# Patient Record
Sex: FEMALE | Race: WHITE | NOT HISPANIC OR LATINO | Employment: PART TIME | URBAN - METROPOLITAN AREA
[De-identification: names, ages, dates, MRNs, and addresses within clinical notes are randomized per-mention and may not be internally consistent; named-entity substitution may affect disease eponyms.]

---

## 2019-05-06 DIAGNOSIS — Z00.00 HEALTH CARE MAINTENANCE: Primary | ICD-10-CM

## 2019-05-06 PROBLEM — J30.1 SEASONAL ALLERGIC RHINITIS DUE TO POLLEN: Status: ACTIVE | Noted: 2019-05-06

## 2019-05-06 PROBLEM — J45.20 ASTHMA, MILD INTERMITTENT: Status: ACTIVE | Noted: 2019-05-06

## 2019-05-06 PROBLEM — E78.2 MIXED HYPERLIPIDEMIA: Status: ACTIVE | Noted: 2019-05-06

## 2019-05-13 ENCOUNTER — LAB (OUTPATIENT)
Dept: INTERNAL MEDICINE | Facility: CLINIC | Age: 59
End: 2019-05-13

## 2019-05-13 DIAGNOSIS — Z00.00 HEALTH CARE MAINTENANCE: Primary | ICD-10-CM

## 2019-05-13 LAB
ALBUMIN SERPL-MCNC: 4.1 G/DL (ref 3.5–5.2)
ALBUMIN/GLOB SERPL: 1.5 G/DL
ALP SERPL-CCNC: 79 U/L (ref 39–117)
ALT SERPL W P-5'-P-CCNC: 15 U/L (ref 1–33)
ANION GAP SERPL CALCULATED.3IONS-SCNC: 10.7 MMOL/L
AST SERPL-CCNC: 11 U/L (ref 1–32)
BACTERIA UR QL AUTO: ABNORMAL /HPF
BASOPHILS # BLD AUTO: 0.05 10*3/MM3 (ref 0–0.2)
BASOPHILS NFR BLD AUTO: 1 % (ref 0–1.5)
BILIRUB SERPL-MCNC: 0.5 MG/DL (ref 0.2–1.2)
BUN BLD-MCNC: 10 MG/DL (ref 8–23)
BUN/CREAT SERPL: 13.2 (ref 7–25)
CALCIUM SPEC-SCNC: 9.4 MG/DL (ref 8.6–10.5)
CHLORIDE SERPL-SCNC: 102 MMOL/L (ref 98–107)
CHOLEST SERPL-MCNC: 197 MG/DL (ref 0–200)
CO2 SERPL-SCNC: 26.3 MMOL/L (ref 22–29)
CREAT BLD-MCNC: 0.76 MG/DL (ref 0.57–1)
DEPRECATED RDW RBC AUTO: 41.7 FL (ref 37–54)
EOSINOPHIL # BLD AUTO: 0.21 10*3/MM3 (ref 0–0.4)
EOSINOPHIL NFR BLD AUTO: 4.3 % (ref 0.3–6.2)
ERYTHROCYTE [DISTWIDTH] IN BLOOD BY AUTOMATED COUNT: 13.4 % (ref 12.3–15.4)
GFR SERPL CREATININE-BSD FRML MDRD: 78 ML/MIN/1.73
GLOBULIN UR ELPH-MCNC: 2.7 GM/DL
GLUCOSE BLD-MCNC: 103 MG/DL (ref 65–99)
HCT VFR BLD AUTO: 44.5 % (ref 34–46.6)
HDLC SERPL-MCNC: 56 MG/DL (ref 40–60)
HGB BLD-MCNC: 14.6 G/DL (ref 12–15.9)
HYALINE CASTS UR QL AUTO: ABNORMAL /LPF
LDLC SERPL CALC-MCNC: 124 MG/DL (ref 0–100)
LDLC/HDLC SERPL: 2.22 {RATIO}
LYMPHOCYTES # BLD AUTO: 1.25 10*3/MM3 (ref 0.7–3.1)
LYMPHOCYTES NFR BLD AUTO: 25.9 % (ref 19.6–45.3)
MCH RBC QN AUTO: 28.4 PG (ref 26.6–33)
MCHC RBC AUTO-ENTMCNC: 32.8 G/DL (ref 31.5–35.7)
MCV RBC AUTO: 86.6 FL (ref 79–97)
MONOCYTES # BLD AUTO: 0.29 10*3/MM3 (ref 0.1–0.9)
MONOCYTES NFR BLD AUTO: 6 % (ref 5–12)
MUCOUS THREADS URNS QL MICRO: ABNORMAL /HPF
NEUTROPHILS # BLD AUTO: 3.03 10*3/MM3 (ref 1.7–7)
NEUTROPHILS NFR BLD AUTO: 62.8 % (ref 42.7–76)
PLATELET # BLD AUTO: 221 10*3/MM3 (ref 140–450)
PMV BLD AUTO: 8.5 FL (ref 6–12)
POTASSIUM BLD-SCNC: 4.7 MMOL/L (ref 3.5–5.2)
PROT SERPL-MCNC: 6.8 G/DL (ref 6–8.5)
RBC # BLD AUTO: 5.14 10*6/MM3 (ref 3.77–5.28)
RBC # UR: ABNORMAL /HPF
REF LAB TEST METHOD: ABNORMAL
SODIUM BLD-SCNC: 139 MMOL/L (ref 136–145)
SQUAMOUS #/AREA URNS HPF: ABNORMAL /HPF
TRIGL SERPL-MCNC: 83 MG/DL (ref 0–150)
TSH SERPL DL<=0.05 MIU/L-ACNC: 2.17 MIU/ML (ref 0.27–4.2)
VLDLC SERPL-MCNC: 16.6 MG/DL (ref 5–40)
WBC NRBC COR # BLD: 4.83 10*3/MM3 (ref 3.4–10.8)
WBC UR QL AUTO: ABNORMAL /HPF

## 2019-05-13 PROCEDURE — 80053 COMPREHEN METABOLIC PANEL: CPT | Performed by: INTERNAL MEDICINE

## 2019-05-13 PROCEDURE — 85025 COMPLETE CBC W/AUTO DIFF WBC: CPT | Performed by: INTERNAL MEDICINE

## 2019-05-13 PROCEDURE — 81015 MICROSCOPIC EXAM OF URINE: CPT | Performed by: INTERNAL MEDICINE

## 2019-05-13 PROCEDURE — 84443 ASSAY THYROID STIM HORMONE: CPT | Performed by: INTERNAL MEDICINE

## 2019-05-13 PROCEDURE — 80061 LIPID PANEL: CPT | Performed by: INTERNAL MEDICINE

## 2019-05-13 PROCEDURE — 36415 COLL VENOUS BLD VENIPUNCTURE: CPT | Performed by: INTERNAL MEDICINE

## 2019-05-17 ENCOUNTER — OFFICE VISIT (OUTPATIENT)
Dept: INTERNAL MEDICINE | Facility: CLINIC | Age: 59
End: 2019-05-17

## 2019-05-17 VITALS
HEIGHT: 61 IN | RESPIRATION RATE: 14 BRPM | BODY MASS INDEX: 30.02 KG/M2 | SYSTOLIC BLOOD PRESSURE: 128 MMHG | DIASTOLIC BLOOD PRESSURE: 62 MMHG | WEIGHT: 159 LBS

## 2019-05-17 DIAGNOSIS — Z12.31 VISIT FOR SCREENING MAMMOGRAM: ICD-10-CM

## 2019-05-17 DIAGNOSIS — F33.1 MODERATE EPISODE OF RECURRENT MAJOR DEPRESSIVE DISORDER (HCC): ICD-10-CM

## 2019-05-17 DIAGNOSIS — Z00.00 HEALTH CARE MAINTENANCE: Primary | ICD-10-CM

## 2019-05-17 PROCEDURE — 99386 PREV VISIT NEW AGE 40-64: CPT | Performed by: INTERNAL MEDICINE

## 2019-05-17 RX ORDER — ESCITALOPRAM OXALATE 20 MG/1
20 TABLET ORAL DAILY
Qty: 30 TABLET | Refills: 11 | Status: SHIPPED | OUTPATIENT
Start: 2019-05-17 | End: 2020-01-31

## 2019-05-17 RX ORDER — MONTELUKAST SODIUM 10 MG/1
10 TABLET ORAL NIGHTLY
Qty: 90 TABLET | Refills: 3 | Status: SHIPPED | OUTPATIENT
Start: 2019-05-17 | End: 2020-06-05

## 2019-05-17 NOTE — PATIENT INSTRUCTIONS
Risk evaluation:  1. Cardiovascular risk factors: obesity, family history of CAD, sedentary life style   2. Diabetes risk factors: obesity, impaired fasting blood sugars and sedentary life style.  3. Cancer risk factors: FH of breast cancer.  4. Risky behavior: none. Use of seat belts: regular. Use of sunscreens: none, patient states that practices sun avoidance.   Tattoos: none.  H/o blood transfusions/organ transplants before 1992 or clotting factor transfusion before 1987: none.     Prevention:  Cholesterol test  up to date. Cholesterol is normal..  Blood sugar test up to date. Fasting blood sugar is mildly elevated. Recommended to avoid sweets and starches: pasta, pizza, bread, potato, corn.  Needs to stop drinking soft drinks..  Hep C testing (for patients born [id number]): discussed and recommended, will proceed with testing..  Mammogram is due. Will schedule.. Breast self exams recommended once a month.  Colonoscopy: Reasons for the screening, benefits, available modalities discussed with patient. Colonoscopy recommended. Risks of undetected polyps and colon cancer explained. Patient declines.She layne not have a car and is in a very difficult financial situation. Has no money for the ride, even if it is a bus.  PAP smear : done today..  DEXA : not indicated yet..     Iliotibial band syndrome-I had suggested that the patient look up physical therapy and exercises for this condition online.  Start doing those at home.  Knee pain-most likely combination of iliotibial band syndrome and osteoarthritis.  Weight loss would be very beneficial.  Use some heat or over-the-counter IcyHot, salonpas patches, and likes for pain if needed.  Major depression, not controlled, recurrent-we will restart Lexapro, that used to work for her in the past.  Moderate intermittent asthma-we will restart montelukast once a day.  Patient states that she is not able to afford inhalers due to high cost.  Given prescription for albuterol  HFA.  Mildly elevated fasting blood sugar-I had advised the patient needs to stop drinking soft drinks.  Weight loss and regular exercise recommended.  Will monitor.  Family history of breast cancer in her sister-importance of the mammogram for screening and prevention explained.  I had advised the patient that if she has significant co-pay for this test, she is to call my office and let me know and I will try to help your as much as they can.  At that point mammogram is mandatory.

## 2019-05-17 NOTE — PROGRESS NOTES
"Subjective   Paula Trevino is a 58 y.o. female.     History of Present Illness   /62 (BP Location: Left arm, Patient Position: Sitting, Cuff Size: Adult)   Resp 14   Ht 154.9 cm (61\")   Wt 72.1 kg (159 lb)   BMI 30.04 kg/m²   Patient is here for yearly CPE. Last CPE was  4 year ago.  PMH, SH and FH reviewed. Changes in the FH: none .  Patient rates her own health as: good.  Tobacco use: none.  Alcohol use:none.  Recreational drugs use: none  Medication list rewieved.  Diet: regular.  Exercise: none.  Marital status: single.   Employment: part time.  Patient rates her stress level as: high.  Dental health: good. Brushes teeth 1 time a day, flosses 1 time a day . Dental visits: 2 times a year .  Vision correction: glasses  Hearing: normal.    Recent vaccinations:   Flu discussed and recommended, but patient declines  Tdap  is up to date  Shingles prevention discussed and recommended to get Shindrix at the pharmacy    Patient is postmenopausal. Past pregnancies: none. Currently patient is on no HRT .    No current outpatient medications on file.                             The following portions of the patient's history were reviewed and updated as appropriate: allergies, current medications, past family history, past medical history, past social history, past surgical history and problem list.    Review of Systems   Constitutional: Negative for chills and fever.   HENT: Negative for postnasal drip, sinus pressure and sore throat.    Eyes: Negative for pain and itching.   Respiratory: Positive for shortness of breath. Negative for cough and chest tightness.    Cardiovascular: Positive for palpitations. Negative for chest pain and leg swelling.   Gastrointestinal: Negative for abdominal pain and blood in stool.   Endocrine: Negative for cold intolerance and heat intolerance.   Genitourinary: Negative for difficulty urinating and flank pain.   Musculoskeletal: Positive for back pain and neck pain.   Skin: " Negative for color change and rash.   Neurological: Negative for dizziness, weakness and headaches.   Hematological: Negative for adenopathy. Does not bruise/bleed easily.   Psychiatric/Behavioral: Positive for sleep disturbance. The patient is nervous/anxious.        Objective   Physical Exam   Constitutional: She is oriented to person, place, and time. She appears well-developed. No distress.   obese   HENT:   Head: Normocephalic and atraumatic.   Right Ear: Tympanic membrane, external ear and ear canal normal. No tenderness. No mastoid tenderness. Tympanic membrane is not injected. No middle ear effusion.   Left Ear: Tympanic membrane, external ear and ear canal normal. No tenderness. No mastoid tenderness. Tympanic membrane is not injected.  No middle ear effusion.   Nose: Nose normal. Right sinus exhibits no maxillary sinus tenderness and no frontal sinus tenderness. Left sinus exhibits no maxillary sinus tenderness and no frontal sinus tenderness.   Mouth/Throat: Uvula is midline, oropharynx is clear and moist and mucous membranes are normal. No oropharyngeal exudate or posterior oropharyngeal edema.   Eyes: Conjunctivae, EOM and lids are normal. Pupils are equal, round, and reactive to light. Right eye exhibits no discharge. Left eye exhibits no discharge. Right conjunctiva is not injected. Right conjunctiva has no hemorrhage. Left conjunctiva is not injected. Left conjunctiva has no hemorrhage. No scleral icterus. Right eye exhibits normal extraocular motion and no nystagmus. Left eye exhibits normal extraocular motion and no nystagmus.   Neck: Normal range of motion and full passive range of motion without pain. Neck supple. No JVD present. Carotid bruit is not present. No thyromegaly present.   Cardiovascular: Normal rate, regular rhythm, S1 normal, S2 normal, normal heart sounds and intact distal pulses. Exam reveals no gallop and no friction rub.   No murmur heard.  Pulmonary/Chest: Effort normal and  breath sounds normal. No accessory muscle usage. No respiratory distress. She has no decreased breath sounds. She has no wheezes. She has no rhonchi. She has no rales. Right breast exhibits no inverted nipple, no mass, no nipple discharge, no skin change and no tenderness. Left breast exhibits mass. Left breast exhibits no inverted nipple, no nipple discharge, no skin change and no tenderness. Breasts are symmetrical. There is no breast swelling.   On palpation of the left breast there is tender movable grape shaped 12-15 mm soft tissue mass at 2 o'clock position in the outer upper quadrant.       Abdominal: Soft. Bowel sounds are normal. She exhibits no distension, no abdominal bruit and no mass. There is no tenderness. There is no rebound and no guarding. Hernia confirmed negative in the right inguinal area and confirmed negative in the left inguinal area.   Genitourinary: Rectum normal, vagina normal and uterus normal. Rectal exam shows no fissure, no mass and anal tone normal. No breast tenderness, discharge or bleeding. Pelvic exam was performed with patient supine. No labial fusion. There is no rash, tenderness, lesion or injury on the right labia. There is no rash, tenderness, lesion or injury on the left labia. Cervix exhibits no motion tenderness, no discharge and no friability. Right adnexum displays no mass, no tenderness and no fullness. Left adnexum displays no mass, no tenderness and no fullness. No bleeding in the vagina. No vaginal discharge found.   Genitourinary Comments: PAP taken.   Musculoskeletal: She exhibits edema (non-pitting edema +2).   There is a crepitus on range of motion in the left knee.  Patient has tenderness on palpation of the left knee laterally and immediately below the joint line.  Greta test for ITB is positive on the left side.   Lymphadenopathy:        Head (right side): No submental, no submandibular, no preauricular, no posterior auricular and no occipital adenopathy  present.        Head (left side): No submental, no submandibular, no preauricular, no posterior auricular and no occipital adenopathy present.     She has no cervical adenopathy.        Right cervical: No superficial cervical, no deep cervical and no posterior cervical adenopathy present.       Left cervical: No superficial cervical, no deep cervical and no posterior cervical adenopathy present.     She has no axillary adenopathy.        Right: No inguinal and no supraclavicular adenopathy present.        Left: No inguinal and no supraclavicular adenopathy present.   Neurological: She is alert and oriented to person, place, and time. She has normal reflexes. She displays normal reflexes. No cranial nerve deficit or sensory deficit. She exhibits normal muscle tone. Coordination normal.   Reflex Scores:       Bicep reflexes are 2+ on the right side and 2+ on the left side.       Patellar reflexes are 2+ on the right side and 2+ on the left side.  There is some crepitus on range of motion left knee.  On palpation of the lateral left knee there is a tenderness at the joint line and below.  Greta test for RITB is positive on the left side.  Patellar grind test on the left knee is negative.   Skin: Skin is warm. She is not diaphoretic. No cyanosis. Nails show no clubbing.   Psychiatric: She has a normal mood and affect. Her behavior is normal.   Vitals reviewed.      Assessment/Plan   Paula was seen today for establish care.    Diagnoses and all orders for this visit:    Health care maintenance    Moderate episode of recurrent major depressive disorder (CMS/HCC)  -     escitalopram (LEXAPRO) 20 MG tablet; Take 1 tablet by mouth Daily.  -     montelukast (SINGULAIR) 10 MG tablet; Take 1 tablet by mouth Every Night.    Visit for screening mammogram  -     Mammo Screening Bilateral With CAD        Risk evaluation:  1. Cardiovascular risk factors: obesity, family history of CAD, sedentary life style   2. Diabetes risk  factors: obesity, impaired fasting blood sugars and sedentary life style.  3. Cancer risk factors: FH of breast cancer.  4. Risky behavior: none. Use of seat belts: regular. Use of sunscreens: none, patient states that practices sun avoidance.   Tattoos: none.  H/o blood transfusions/organ transplants before 1992 or clotting factor transfusion before 1987: none.     Prevention:  Cholesterol test  up to date. Cholesterol is normal..  Blood sugar test up to date. Fasting blood sugar is mildly elevated. Recommended to avoid sweets and starches: pasta, pizza, bread, potato, corn.  Needs to stop drinking soft drinks..  Hep C testing (for patients born [id number]): discussed and recommended, will proceed with testing..  Mammogram is due. Will schedule.. Breast self exams recommended once a month.  Colonoscopy: Reasons for the screening, benefits, available modalities discussed with patient. Colonoscopy recommended. Risks of undetected polyps and colon cancer explained. Patient declines.She layne not have a car and is in a very difficult financial situation. Has no money for the ride, even if it is a bus.  PAP smear : done today..  DEXA : not indicated yet..     Iliotibial band syndrome-I had suggested that the patient look up physical therapy and exercises for this condition online.  Start doing those at home.  Knee pain-most likely combination of iliotibial band syndrome and osteoarthritis.  Weight loss would be very beneficial.  Use some heat or over-the-counter IcyHot, salonpas patches, and likes for pain if needed.  Major depression, not controlled, recurrent-we will restart Lexapro, that used to work for her in the past.  Moderate intermittent asthma-we will restart montelukast once a day.  Patient states that she is not able to afford inhalers due to high cost.  Given prescription for albuterol HFA.  Mildly elevated fasting blood sugar-I had advised the patient needs to stop drinking soft drinks.  Weight loss and  regular exercise recommended.  Will monitor.  Family history of breast cancer in her sister-importance of the mammogram for screening and prevention explained.  I had advised the patient that if she has significant co-pay for this test, she is to call my office and let me know and I will try to help your as much as they can.  At that point mammogram is mandatory

## 2019-05-22 LAB
CHROM ANALY OVERALL INTERP-IMP: NORMAL
CONV .: NORMAL
CONV .: NORMAL
CONV PERFORMED BY:: NORMAL
DX ICD CODE: NORMAL
HIV 1 & 2 AB SER-IMP: NORMAL
REF LAB TEST METHOD: NORMAL
STAT OF ADQ CVX/VAG CYTO-IMP: NORMAL

## 2019-05-28 ENCOUNTER — TRANSCRIBE ORDERS (OUTPATIENT)
Dept: ADMINISTRATIVE | Facility: HOSPITAL | Age: 59
End: 2019-05-28

## 2019-05-28 DIAGNOSIS — Z12.31 VISIT FOR SCREENING MAMMOGRAM: Primary | ICD-10-CM

## 2019-06-13 ENCOUNTER — APPOINTMENT (OUTPATIENT)
Dept: MAMMOGRAPHY | Facility: HOSPITAL | Age: 59
End: 2019-06-13

## 2019-06-27 ENCOUNTER — APPOINTMENT (OUTPATIENT)
Dept: MAMMOGRAPHY | Facility: HOSPITAL | Age: 59
End: 2019-06-27

## 2019-07-11 ENCOUNTER — HOSPITAL ENCOUNTER (OUTPATIENT)
Dept: MAMMOGRAPHY | Facility: HOSPITAL | Age: 59
Discharge: HOME | End: 2019-07-11
Admitting: INTERNAL MEDICINE

## 2019-07-11 DIAGNOSIS — Z12.31 VISIT FOR SCREENING MAMMOGRAM: ICD-10-CM

## 2019-07-11 PROCEDURE — 77063 BREAST TOMOSYNTHESIS BI: CPT

## 2019-07-11 PROCEDURE — 77067 SCR MAMMO BI INCL CAD: CPT

## 2019-11-01 ENCOUNTER — TELEPHONE (OUTPATIENT)
Dept: INTERNAL MEDICINE | Facility: CLINIC | Age: 59
End: 2019-11-01

## 2019-11-01 DIAGNOSIS — R35.0 URINARY FREQUENCY: Primary | ICD-10-CM

## 2019-11-01 RX ORDER — CIPROFLOXACIN 250 MG/1
250 TABLET, FILM COATED ORAL 2 TIMES DAILY
Qty: 6 TABLET | Refills: 0 | Status: SHIPPED | OUTPATIENT
Start: 2019-11-01 | End: 2019-11-04

## 2019-11-01 NOTE — TELEPHONE ENCOUNTER
Ciprofloxacin been called to my her pharmacy.  Take 1 pill twice a day for 3 days.  I think this is the only option, as she is allergic to everything else.  But this is a good medication.

## 2020-01-30 ENCOUNTER — TELEPHONE (OUTPATIENT)
Dept: INTERNAL MEDICINE | Facility: CLINIC | Age: 60
End: 2020-01-30

## 2020-01-30 NOTE — TELEPHONE ENCOUNTER
Patient is calling in complaining of prolonged pain when urinating and burning and lower abdomen pain. Also currently has a cough, chills, and dry itchy throat. Patient was offered an APRN appointment is trying to be seen only by Dr. Mahoney.     HAS AN APPT  WITH HER THERAPIST so wont be able to answer during that time.

## 2020-01-30 NOTE — TELEPHONE ENCOUNTER
Patient notified, she is going to try to find a ride for appointment tomorrow if unable to come she will contact the office early in the morning

## 2020-01-31 ENCOUNTER — OFFICE VISIT (OUTPATIENT)
Dept: INTERNAL MEDICINE | Facility: CLINIC | Age: 60
End: 2020-01-31

## 2020-01-31 VITALS
HEIGHT: 61 IN | BODY MASS INDEX: 30.78 KG/M2 | SYSTOLIC BLOOD PRESSURE: 122 MMHG | TEMPERATURE: 98.3 F | WEIGHT: 163 LBS | RESPIRATION RATE: 16 BRPM | DIASTOLIC BLOOD PRESSURE: 82 MMHG

## 2020-01-31 DIAGNOSIS — F33.1 MODERATE EPISODE OF RECURRENT MAJOR DEPRESSIVE DISORDER (HCC): ICD-10-CM

## 2020-01-31 DIAGNOSIS — J02.9 SORE THROAT: ICD-10-CM

## 2020-01-31 DIAGNOSIS — J06.9 VIRAL UPPER RESPIRATORY TRACT INFECTION: ICD-10-CM

## 2020-01-31 DIAGNOSIS — Z23 NEED FOR VACCINATION: Primary | ICD-10-CM

## 2020-01-31 PROCEDURE — 99214 OFFICE O/P EST MOD 30 MIN: CPT | Performed by: INTERNAL MEDICINE

## 2020-01-31 RX ORDER — DESVENLAFAXINE 50 MG/1
50 TABLET, EXTENDED RELEASE ORAL DAILY
Qty: 30 TABLET | Refills: 5 | Status: SHIPPED | OUTPATIENT
Start: 2020-01-31 | End: 2020-06-11

## 2020-01-31 RX ORDER — PREDNISONE 10 MG/1
TABLET ORAL
Qty: 20 TABLET | Refills: 0 | Status: SHIPPED | OUTPATIENT
Start: 2020-01-31 | End: 2020-06-11

## 2020-01-31 NOTE — PATIENT INSTRUCTIONS
1. Depression - patient had stopped Escitalopram on her own due to the weight gain. Will change to daily Desvenalafaxine - to be taken once a day. This medication will not cause weight gain. Takes 2-3 weeks for the effect. Continue sessions with therapist.  2. Upper respiratory infection - will treat with low course of steroids, to be taken with meals.  3. Frequent UTIs - needs to try Cranberry tablets or D-mannose over the counter.

## 2020-01-31 NOTE — PROGRESS NOTES
"Subjective   Paula Trevino is a 59 y.o. female.     History of Present Illness   /82 (BP Location: Left arm, Patient Position: Sitting, Cuff Size: Adult)   Temp 98.3 °F (36.8 °C)   Resp 16   Ht 154.9 cm (61\")   Wt 73.9 kg (163 lb)   BMI 30.80 kg/m²   Patient complains of nasal congestion and dry cough. S-ms started 5 days ago ago.Patient describes cough as nonproductive. Associated symptoms include headache, sneezing and sore throat. Symptoms get worse  laying down. Patient has fatigue. Patient has had some chest congestion, but no wheezing.   Patient has had no ID contacts.   Overall s-ms had been fluctuating a bit. Patient had tried to use OTC cough medication. without improvement.  She had stopped Escitalopram due to the weight gain.  Patient c/o frequent bladder spasms, not sure iof she has frequent UTIs - had been taking AZO OTC, and states that s-sms reoccur every time that she stops it.   Patient I also worries about palpable nodule in the right side of the torso, no pain .      Current Outpatient Medications:   •  escitalopram (LEXAPRO) 20 MG tablet, Take 1 tablet by mouth Daily., Disp: 30 tablet, Rfl: 11  •  montelukast (SINGULAIR) 10 MG tablet, Take 1 tablet by mouth Every Night., Disp: 90 tablet, Rfl: 3  The following portions of the patient's history were reviewed and updated as appropriate: allergies, current medications, past family history, past medical history, past social history, past surgical history and problem list.       Review of Systems   Constitutional: Positive for chills. Negative for fever.   Eyes: Negative for pain and redness.   Respiratory: Positive for cough and shortness of breath.    Cardiovascular: Negative for chest pain and leg swelling.   Neurological: Positive for headaches. Negative for dizziness.       Objective   Physical Exam   Constitutional: She is oriented to person, place, and time. She appears well-developed and well-nourished. No distress.   obese   HENT: "   Head: Normocephalic and atraumatic.   Right Ear: Tympanic membrane, external ear and ear canal normal. No tenderness. No mastoid tenderness. Tympanic membrane is not injected. No middle ear effusion.   Left Ear: Tympanic membrane, external ear and ear canal normal. No tenderness. No mastoid tenderness. Tympanic membrane is not injected.  No middle ear effusion.   Nose: Nose normal. No sinus tenderness. Right sinus exhibits no maxillary sinus tenderness and no frontal sinus tenderness. Left sinus exhibits no maxillary sinus tenderness and no frontal sinus tenderness.   Mouth/Throat: Uvula is midline, oropharynx is clear and moist and mucous membranes are normal. No oral lesions. No oropharyngeal exudate.   Eyes: Pupils are equal, round, and reactive to light. Conjunctivae, EOM and lids are normal. Right eye exhibits no discharge. Left eye exhibits no discharge. No scleral icterus.   Neck: Neck supple. No JVD present. No thyromegaly present.   Cardiovascular: Normal rate, regular rhythm and normal heart sounds. Exam reveals no gallop and no friction rub.   No murmur heard.  Pulmonary/Chest: Effort normal and breath sounds normal. No accessory muscle usage. No tachypnea and no bradypnea. No respiratory distress. She has no decreased breath sounds. She has no wheezes. She has no rales. She exhibits no tenderness.   Musculoskeletal: She exhibits no edema.   Palpable, movable, non-tender olive shaped 20 mm mass over the left thoracic cage in axillary line   Lymphadenopathy:        Head (right side): No submental, no submandibular, no preauricular, no posterior auricular and no occipital adenopathy present.        Head (left side): No submental, no submandibular, no preauricular, no posterior auricular and no occipital adenopathy present.     She has no cervical adenopathy.        Right cervical: No superficial cervical, no deep cervical and no posterior cervical adenopathy present.       Left cervical: No superficial  cervical, no deep cervical and no posterior cervical adenopathy present.        Right: No supraclavicular adenopathy present.        Left: No supraclavicular adenopathy present.   Neurological: She is alert and oriented to person, place, and time. No cranial nerve deficit.   Skin: Skin is warm and dry. No rash noted. She is not diaphoretic.   Psychiatric: She has a normal mood and affect. Her behavior is normal.   Vitals reviewed.      Assessment/Plan   Paula was seen today for cough.    Diagnoses and all orders for this visit:    Need for vaccination  -     Cancel: POC Influenza A / B  -     predniSONE (DELTASONE) 10 MG tablet; TID x 3d, then BID x 3d, then qd x 5d    Sore throat  -     Cancel: POC Rapid Strep A    Moderate episode of recurrent major depressive disorder (CMS/HCC)  -     desvenlafaxine ER (KHEDEZLA) 50 MG tablet sustained-release 24 hour 24 hr tablet; Take 1 tablet by mouth Daily.    Viral upper respiratory tract infection    1. Depression - patient had stopped Escitalopram on her own due to the weight gain. Will change to daily Desvenlafaxine - to be taken once a day. This medication will not cause weight gain. Takes 2-3 weeks for the effect. Continue sessions with therapist.  2. Upper respiratory infection - will treat with low course of steroids, to be taken with meals.  3. Frequent UTIs - needs to try Cranberry tablets or D-mannose over the counter.  4. Thoracic wall lipoma - reassured. No interventions needed.

## 2020-06-04 DIAGNOSIS — F33.1 MODERATE EPISODE OF RECURRENT MAJOR DEPRESSIVE DISORDER (HCC): ICD-10-CM

## 2020-06-05 RX ORDER — MONTELUKAST SODIUM 10 MG/1
TABLET ORAL
Qty: 90 TABLET | Refills: 0 | Status: SHIPPED | OUTPATIENT
Start: 2020-06-05 | End: 2020-10-09 | Stop reason: SDUPTHER

## 2020-06-11 ENCOUNTER — OFFICE VISIT (OUTPATIENT)
Dept: INTERNAL MEDICINE | Facility: CLINIC | Age: 60
End: 2020-06-11

## 2020-06-11 VITALS
WEIGHT: 173 LBS | BODY MASS INDEX: 32.66 KG/M2 | SYSTOLIC BLOOD PRESSURE: 124 MMHG | HEART RATE: 84 BPM | DIASTOLIC BLOOD PRESSURE: 78 MMHG | HEIGHT: 61 IN | OXYGEN SATURATION: 98 %

## 2020-06-11 DIAGNOSIS — Z12.31 VISIT FOR SCREENING MAMMOGRAM: ICD-10-CM

## 2020-06-11 DIAGNOSIS — J45.20 MILD INTERMITTENT ASTHMA, UNSPECIFIED WHETHER COMPLICATED: ICD-10-CM

## 2020-06-11 DIAGNOSIS — Z12.12 SCREENING FOR COLORECTAL CANCER: ICD-10-CM

## 2020-06-11 DIAGNOSIS — Z00.00 HEALTH CARE MAINTENANCE: Primary | ICD-10-CM

## 2020-06-11 DIAGNOSIS — F33.1 MODERATE EPISODE OF RECURRENT MAJOR DEPRESSIVE DISORDER (HCC): ICD-10-CM

## 2020-06-11 DIAGNOSIS — Z11.59 SCREENING FOR VIRAL DISEASE: ICD-10-CM

## 2020-06-11 DIAGNOSIS — Z12.11 SCREENING FOR COLORECTAL CANCER: ICD-10-CM

## 2020-06-11 PROCEDURE — 99396 PREV VISIT EST AGE 40-64: CPT | Performed by: INTERNAL MEDICINE

## 2020-06-11 RX ORDER — VENLAFAXINE 37.5 MG/1
37.5 TABLET ORAL 2 TIMES DAILY
Qty: 60 TABLET | Refills: 11 | Status: SHIPPED | OUTPATIENT
Start: 2020-06-11 | End: 2021-06-19 | Stop reason: SDUPTHER

## 2020-06-11 RX ORDER — ALBUTEROL SULFATE 90 UG/1
AEROSOL, METERED RESPIRATORY (INHALATION)
COMMUNITY
Start: 2020-05-09 | End: 2022-12-27 | Stop reason: SDUPTHER

## 2020-06-11 NOTE — PROGRESS NOTES
"Subjective   Paula Trevino is a 59 y.o. female.     History of Present Illness   /78 (BP Location: Right arm, Patient Position: Sitting, Cuff Size: Adult)   Pulse 84   Ht 154.9 cm (61\")   Wt 78.5 kg (173 lb)   SpO2 98%   BMI 32.69 kg/m²   Patient is here for yearly CPE. Last CPE was  1 year ago.  PMH, SH and FH reviewed. Changes in the FH: none .  Patient rates her own health as: good.  Tobacco use: none.  Alcohol use:none.  Recreational drugs use: none  Medication list rewieved.  Diet: regular.  Exercise: none.  Marital status: single.   Employment: recently had been laid off due to coronavisus, and is hoping to be called back to work soon..  Patient rates her stress level as: moderate. She had been working with therapist, and this had helped a lot. Had not been able to see therapist due to COVID 19 - telehealth is not covered by her insurance at that time.  Dental health: good. Brushes teeth 2 times a day, flosses 1 time a day . Dental visits: 2 times a year .  Vision correction: reading glasses  Hearing: normal.    Recent vaccinations:   Flu  is up to date and recommended yearly  Tdap  is up to date  Shingles prevention will address after the pandemic.    Patient is postmenopausal. Past pregnancies: none. Currently patient is not a candidate for HRT.      Current Outpatient Medications:   •  albuterol sulfate  (90 Base) MCG/ACT inhaler, INHALE 2 PUFFS BY MOUTH EVERY HOUR AS NEEDED FOR SHORTNESS OF BREATH, Disp: , Rfl:   •  montelukast (SINGULAIR) 10 MG tablet, TAKE 1 TABLET BY MOUTH AT NIGHT, Disp: 90 tablet, Rfl: 0          The following portions of the patient's history were reviewed and updated as appropriate: allergies, current medications, past family history, past medical history, past social history, past surgical history and problem list.    Review of Systems   Constitutional: Negative for chills and fever.   HENT: Negative for postnasal drip, sinus pressure and sore throat.    Eyes: " Negative for pain and itching.   Respiratory: Negative for cough and chest tightness.    Cardiovascular: Negative for chest pain and leg swelling.   Gastrointestinal: Negative for abdominal pain and blood in stool.   Endocrine: Negative for cold intolerance and heat intolerance.   Genitourinary: Negative for difficulty urinating and flank pain.   Musculoskeletal: Negative for back pain and neck pain.   Skin: Negative for color change and rash.   Neurological: Negative for dizziness, weakness and headaches.   Hematological: Negative for adenopathy. Does not bruise/bleed easily.   Psychiatric/Behavioral: Negative for sleep disturbance. The patient is not nervous/anxious.        Objective   Physical Exam   Constitutional: She is oriented to person, place, and time. Vital signs are normal. She appears well-developed. No distress.   obese   HENT:   Head: Normocephalic and atraumatic.   Right Ear: External ear normal.   Left Ear: External ear normal.   Nose: Nose normal. No mucosal edema. Right sinus exhibits no maxillary sinus tenderness and no frontal sinus tenderness. Left sinus exhibits no maxillary sinus tenderness and no frontal sinus tenderness.   Mouth/Throat: Oropharynx is clear and moist. No oropharyngeal exudate.   Eyes: Pupils are equal, round, and reactive to light. Conjunctivae, EOM and lids are normal. Right eye exhibits no discharge. Left eye exhibits no discharge. Right conjunctiva is not injected. Left conjunctiva is not injected. No scleral icterus. Right eye exhibits normal extraocular motion. Left eye exhibits normal extraocular motion.   Neck: Normal range of motion and full passive range of motion without pain. Neck supple. No JVD present. Carotid bruit is not present. No thyromegaly present.   Cardiovascular: Normal rate, regular rhythm, S1 normal, S2 normal, normal heart sounds and intact distal pulses. PMI is not displaced. Exam reveals no gallop and no friction rub.   No murmur  heard.  Pulmonary/Chest: Effort normal and breath sounds normal. No accessory muscle usage. No respiratory distress. She has no decreased breath sounds. She has no wheezes. She has no rhonchi. She has no rales. She exhibits bony tenderness (on parasternal palpation over the lower sternum). Right breast exhibits no inverted nipple, no mass, no nipple discharge, no skin change and no tenderness. Left breast exhibits no inverted nipple, no mass, no nipple discharge, no skin change and no tenderness. Breasts are symmetrical.       Abdominal: Soft. Bowel sounds are normal. She exhibits no distension, no pulsatile liver, no fluid wave, no abdominal bruit, no ascites and no mass. There is no tenderness. There is no rebound and no guarding.   Musculoskeletal: She exhibits tenderness (on lateral palpation opf the left hip). She exhibits no edema or deformity.   Lymphadenopathy:        Head (right side): No submental, no submandibular, no preauricular, no posterior auricular and no occipital adenopathy present.        Head (left side): No submental, no submandibular, no tonsillar, no preauricular, no posterior auricular and no occipital adenopathy present.     She has no cervical adenopathy.        Right cervical: No superficial cervical, no deep cervical and no posterior cervical adenopathy present.       Left cervical: No superficial cervical, no deep cervical and no posterior cervical adenopathy present.     She has no axillary adenopathy.        Right: No supraclavicular adenopathy present.        Left: No supraclavicular adenopathy present.   Neurological: She is alert and oriented to person, place, and time. She has normal strength and normal reflexes. She displays normal reflexes. No cranial nerve deficit. She exhibits normal muscle tone. Coordination normal.   Reflex Scores:       Bicep reflexes are 2+ on the right side and 2+ on the left side.       Patellar reflexes are 2+ on the right side and 2+ on the left  side.  Skin: Skin is warm and dry. No rash noted. She is not diaphoretic. No erythema.   Psychiatric: She has a normal mood and affect. Her speech is normal and behavior is normal. Thought content normal.   Vitals reviewed.      Assessment/Plan   Diagnoses and all orders for this visit:    Health care maintenance  -     CBC & Differential; Future  -     Comprehensive Metabolic Panel; Future  -     Lipid Panel; Future  -     TSH; Future    Moderate episode of recurrent major depressive disorder (CMS/HCC)  -     venlafaxine (EFFEXOR) 37.5 MG tablet; Take 1 tablet by mouth 2 (Two) Times a Day.    Mild intermittent asthma, unspecified whether complicated  -     Mometasone Furoate (Asmanex HFA) 200 MCG/ACT aerosol; Inhale 1 puff Daily.    Screening for colorectal cancer  -     Occult Blood, Fecal By Immunoassay - Stool, Per Rectum; Future    Screening for viral disease  -     Hepatitis C Antibody; Future        Risk evaluation:  1. Cardiovascular risk factors: obesity, family history of CAD, sedentary life style   2. Diabetes risk factors: obesity, FH of diabetes and sedentary life style.  3. Cancer risk factors: FH of breast cancer.  4. Risky behavior: none. Use of seat belts: regular. Use of sunscreens: none, patient states that practices sun avoidance.   Tattoos: none.  H/o blood transfusions/organ transplants before 1992 or clotting factor transfusion before 1987: none.     Prevention:  Cholesterol test  recommended. Cholesterol is will be checked..  Blood sugar test recommended. Fasting blood sugar will be checked..  Hep C testing (for patients born [id number]): completed..  Mammogram is due. Will schedule.. Breast self exams recommended once a month.  Colonoscopy: Reasons for the screening, benefits, available modalities discussed with patient. Colonoscopy recommended. Risks of undetected polyps and colon cancer explained. Patient declines. Will proceed with FIT test..  PAP smear : not recommended due to very low  risk..

## 2020-06-11 NOTE — PATIENT INSTRUCTIONS
Risk evaluation:  1. Cardiovascular risk factors: obesity, family history of CAD, sedentary life style   2. Diabetes risk factors: obesity, FH of diabetes and sedentary life style.  3. Cancer risk factors: FH of breast cancer.  4. Risky behavior: none. Use of seat belts: regular. Use of sunscreens: none, patient states that practices sun avoidance.   Tattoos: none.  H/o blood transfusions/organ transplants before 1992 or clotting factor transfusion before 1987: none.     Prevention:  Cholesterol test  recommended. Cholesterol is will be checked..  Blood sugar test recommended. Fasting blood sugar will be checked..  Hep C testing (for patients born [birth year]): completed..  Mammogram is due. Will schedule.. Breast self exams recommended once a month.  Colonoscopy: Reasons for the screening, benefits, available modalities discussed with patient. Colonoscopy recommended. Risks of undetected polyps and colon cancer explained. Patient declines. Will proceed with FIT test..  PAP smear : not recommended due to very low risk..

## 2020-06-16 ENCOUNTER — RESULTS ENCOUNTER (OUTPATIENT)
Dept: INTERNAL MEDICINE | Facility: CLINIC | Age: 60
End: 2020-06-16

## 2020-06-16 DIAGNOSIS — Z11.59 SCREENING FOR VIRAL DISEASE: ICD-10-CM

## 2020-06-16 DIAGNOSIS — Z12.11 SCREENING FOR COLORECTAL CANCER: ICD-10-CM

## 2020-06-16 DIAGNOSIS — Z00.00 HEALTH CARE MAINTENANCE: ICD-10-CM

## 2020-06-16 DIAGNOSIS — Z12.12 SCREENING FOR COLORECTAL CANCER: ICD-10-CM

## 2020-06-19 LAB
ALBUMIN SERPL-MCNC: 4.4 G/DL (ref 3.5–5.2)
ALBUMIN/GLOB SERPL: 2.1 G/DL
ALP SERPL-CCNC: 79 U/L (ref 39–117)
ALT SERPL-CCNC: 18 U/L (ref 1–33)
AST SERPL-CCNC: 16 U/L (ref 1–32)
BASOPHILS # BLD AUTO: 0.03 10*3/MM3 (ref 0–0.2)
BASOPHILS NFR BLD AUTO: 0.6 % (ref 0–1.5)
BILIRUB SERPL-MCNC: 0.5 MG/DL (ref 0.2–1.2)
BUN SERPL-MCNC: 12 MG/DL (ref 8–23)
BUN/CREAT SERPL: 12 (ref 7–25)
CALCIUM SERPL-MCNC: 9.3 MG/DL (ref 8.6–10.5)
CHLORIDE SERPL-SCNC: 105 MMOL/L (ref 98–107)
CHOLEST SERPL-MCNC: 209 MG/DL (ref 0–200)
CO2 SERPL-SCNC: 25.9 MMOL/L (ref 22–29)
CREAT SERPL-MCNC: 1 MG/DL (ref 0.57–1)
EOSINOPHIL # BLD AUTO: 0.25 10*3/MM3 (ref 0–0.4)
EOSINOPHIL NFR BLD AUTO: 4.9 % (ref 0.3–6.2)
ERYTHROCYTE [DISTWIDTH] IN BLOOD BY AUTOMATED COUNT: 12.7 % (ref 12.3–15.4)
GLOBULIN SER CALC-MCNC: 2.1 GM/DL
GLUCOSE SERPL-MCNC: 103 MG/DL (ref 65–99)
HCT VFR BLD AUTO: 42.9 % (ref 34–46.6)
HCV AB S/CO SERPL IA: <0.1 S/CO RATIO (ref 0–0.9)
HDLC SERPL-MCNC: 55 MG/DL (ref 40–60)
HGB BLD-MCNC: 14.8 G/DL (ref 12–15.9)
IMM GRANULOCYTES # BLD AUTO: 0.01 10*3/MM3 (ref 0–0.05)
IMM GRANULOCYTES NFR BLD AUTO: 0.2 % (ref 0–0.5)
LDLC SERPL CALC-MCNC: 138 MG/DL (ref 0–100)
LYMPHOCYTES # BLD AUTO: 1.01 10*3/MM3 (ref 0.7–3.1)
LYMPHOCYTES NFR BLD AUTO: 19.6 % (ref 19.6–45.3)
MCH RBC QN AUTO: 29.5 PG (ref 26.6–33)
MCHC RBC AUTO-ENTMCNC: 34.5 G/DL (ref 31.5–35.7)
MCV RBC AUTO: 85.5 FL (ref 79–97)
MONOCYTES # BLD AUTO: 0.32 10*3/MM3 (ref 0.1–0.9)
MONOCYTES NFR BLD AUTO: 6.2 % (ref 5–12)
NEUTROPHILS # BLD AUTO: 3.53 10*3/MM3 (ref 1.7–7)
NEUTROPHILS NFR BLD AUTO: 68.5 % (ref 42.7–76)
NRBC BLD AUTO-RTO: 0 /100 WBC (ref 0–0.2)
PLATELET # BLD AUTO: 222 10*3/MM3 (ref 140–450)
POTASSIUM SERPL-SCNC: 4 MMOL/L (ref 3.5–5.2)
PROT SERPL-MCNC: 6.5 G/DL (ref 6–8.5)
RBC # BLD AUTO: 5.02 10*6/MM3 (ref 3.77–5.28)
SODIUM SERPL-SCNC: 141 MMOL/L (ref 136–145)
TRIGL SERPL-MCNC: 78 MG/DL (ref 0–150)
TSH SERPL DL<=0.005 MIU/L-ACNC: 2.7 UIU/ML (ref 0.27–4.2)
VLDLC SERPL CALC-MCNC: 15.6 MG/DL
WBC # BLD AUTO: 5.15 10*3/MM3 (ref 3.4–10.8)

## 2020-07-24 ENCOUNTER — HOSPITAL ENCOUNTER (OUTPATIENT)
Dept: MAMMOGRAPHY | Facility: HOSPITAL | Age: 60
Discharge: HOME | End: 2020-07-24
Admitting: INTERNAL MEDICINE

## 2020-07-24 DIAGNOSIS — Z12.31 VISIT FOR SCREENING MAMMOGRAM: ICD-10-CM

## 2020-07-24 PROCEDURE — 77063 BREAST TOMOSYNTHESIS BI: CPT

## 2020-07-24 PROCEDURE — 77067 SCR MAMMO BI INCL CAD: CPT

## 2020-08-11 ENCOUNTER — OFFICE VISIT (OUTPATIENT)
Dept: INTERNAL MEDICINE | Facility: CLINIC | Age: 60
End: 2020-08-11

## 2020-08-11 VITALS
SYSTOLIC BLOOD PRESSURE: 120 MMHG | WEIGHT: 170 LBS | HEIGHT: 61 IN | HEART RATE: 83 BPM | BODY MASS INDEX: 32.1 KG/M2 | DIASTOLIC BLOOD PRESSURE: 78 MMHG | OXYGEN SATURATION: 99 %

## 2020-08-11 DIAGNOSIS — F33.1 MODERATE EPISODE OF RECURRENT MAJOR DEPRESSIVE DISORDER (HCC): Primary | ICD-10-CM

## 2020-08-11 DIAGNOSIS — R35.0 URINARY FREQUENCY: ICD-10-CM

## 2020-08-11 DIAGNOSIS — M54.50 CHRONIC BILATERAL LOW BACK PAIN WITHOUT SCIATICA: ICD-10-CM

## 2020-08-11 DIAGNOSIS — Z12.11 SCREENING FOR COLORECTAL CANCER: ICD-10-CM

## 2020-08-11 DIAGNOSIS — Z12.12 SCREENING FOR COLORECTAL CANCER: ICD-10-CM

## 2020-08-11 DIAGNOSIS — G89.29 CHRONIC BILATERAL LOW BACK PAIN WITHOUT SCIATICA: ICD-10-CM

## 2020-08-11 LAB
BILIRUB UR QL STRIP: NEGATIVE
CLARITY UR: CLEAR
COLOR UR: YELLOW
GLUCOSE UR STRIP-MCNC: NEGATIVE MG/DL
HEMOCCULT STL QL IA: NEGATIVE
HGB UR QL STRIP.AUTO: NEGATIVE
KETONES UR QL STRIP: NEGATIVE
LEUKOCYTE ESTERASE UR QL STRIP.AUTO: NEGATIVE
NITRITE UR QL STRIP: NEGATIVE
PH UR STRIP.AUTO: 5.5 [PH] (ref 5–8)
PROT UR QL STRIP: NEGATIVE
SP GR UR STRIP: 1.02 (ref 1–1.03)
UROBILINOGEN UR QL STRIP: NORMAL

## 2020-08-11 PROCEDURE — 99214 OFFICE O/P EST MOD 30 MIN: CPT | Performed by: NURSE PRACTITIONER

## 2020-08-11 PROCEDURE — 82274 ASSAY TEST FOR BLOOD FECAL: CPT | Performed by: NURSE PRACTITIONER

## 2020-08-11 PROCEDURE — 81003 URINALYSIS AUTO W/O SCOPE: CPT | Performed by: NURSE PRACTITIONER

## 2020-08-11 RX ORDER — CYCLOBENZAPRINE HCL 10 MG
10 TABLET ORAL 3 TIMES DAILY PRN
Qty: 30 TABLET | Refills: 0 | Status: SHIPPED | OUTPATIENT
Start: 2020-08-11 | End: 2020-11-25

## 2020-08-11 NOTE — PROGRESS NOTES
Subjective   Paula Trevino is a 59 y.o. female who presents to establish care and to follow-up on depression.  She also complains of urinary frequency.    She has a longstanding history of depression but has had increased symptoms over the past few months.  She is working with a therapist through telehealth and sees her every 2 weeks which she states has been helpful.  She was started on Effexor at her last visit which she is tolerating well.  She is now managed on twice daily dosing for a total of 75 mg daily.  Her biggest complaint today is urinary frequency and nocturia without dysuria.       The following portions of the patient's history were reviewed and updated as appropriate: allergies, current medications, past social history and problem list.    Past Medical History:   Diagnosis Date   • Depression    • Hyperlipidemia    • Infectious mononucleosis    • Pregnancy        • Seasonal allergies          Current Outpatient Medications:   •  albuterol sulfate  (90 Base) MCG/ACT inhaler, INHALE 2 PUFFS BY MOUTH EVERY HOUR AS NEEDED FOR SHORTNESS OF BREATH, Disp: , Rfl:   •  Mometasone Furoate (Asmanex HFA) 200 MCG/ACT aerosol, Inhale 1 puff Daily., Disp: 1 inhaler, Rfl: 11  •  montelukast (SINGULAIR) 10 MG tablet, TAKE 1 TABLET BY MOUTH AT NIGHT, Disp: 90 tablet, Rfl: 0  •  venlafaxine (EFFEXOR) 37.5 MG tablet, Take 1 tablet by mouth 2 (Two) Times a Day., Disp: 60 tablet, Rfl: 11  •  cyclobenzaprine (FLEXERIL) 10 MG tablet, Take 1 tablet by mouth 3 (Three) Times a Day As Needed for Muscle Spasms., Disp: 30 tablet, Rfl: 0    Allergies   Allergen Reactions   • Ampicillin Rash   • Celecoxib Rash   • Doxycycline Rash   • Erythromycin Rash   • Meperidine Rash   • Sulfa Antibiotics Rash       Review of Systems   Constitutional: Negative for chills, fatigue, fever and unexpected weight change.   HENT: Negative for congestion, ear pain, postnasal drip, sinus pressure, sore throat and trouble swallowing.   "  Eyes: Negative for visual disturbance.   Respiratory: Negative for cough, chest tightness and wheezing.    Cardiovascular: Negative for chest pain, palpitations and leg swelling.   Gastrointestinal: Negative for abdominal pain, blood in stool, nausea and vomiting.   Genitourinary: Positive for frequency and urgency. Negative for dysuria.   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 Negative for joint swelling.   Skin: Negative for color change.   Neurological: Negative for syncope, weakness and headaches.   Hematological: Does not bruise/bleed easily.   Psychiatric/Behavioral: Positive for decreased concentration and dysphoric mood. Negative for self-injury and suicidal ideas. The patient is nervous/anxious.        Objective   Vitals:    08/11/20 1505   BP: 120/78   BP Location: Left arm   Patient Position: Sitting   Cuff Size: Adult   Pulse: 83   SpO2: 99%   Weight: 77.1 kg (170 lb)   Height: 154.9 cm (61\")     Body mass index is 32.12 kg/m².  Physical Exam   Constitutional: She appears well-developed and well-nourished. She is cooperative. She does not have a sickly appearance. She does not appear ill.   HENT:   Head: Normocephalic.   Right Ear: Hearing, tympanic membrane and external ear normal.   Left Ear: Hearing, tympanic membrane and external ear normal.   Nose: Nose normal. No mucosal edema, rhinorrhea, sinus tenderness or nasal deformity. Right sinus exhibits no maxillary sinus tenderness and no frontal sinus tenderness. Left sinus exhibits no maxillary sinus tenderness and no frontal sinus tenderness.   Mouth/Throat: Oropharynx is clear and moist and mucous membranes are normal. Normal dentition.   Eyes: Conjunctivae and lids are normal. Right eye exhibits no discharge and no exudate. Left eye exhibits no discharge and no exudate.   Neck: Trachea normal. Carotid bruit is " not present. No edema present. No thyroid mass present.   Cardiovascular: Regular rhythm, normal heart sounds and normal pulses.   No murmur heard.  Pulmonary/Chest: Breath sounds normal. No respiratory distress. She has no decreased breath sounds. She has no wheezes. She has no rhonchi. She has no rales.   Abdominal: Soft. Bowel sounds are normal. There is no tenderness.   Musculoskeletal:        Lumbar back: She exhibits tenderness.   Lymphadenopathy:        Head (right side): No submental, no submandibular, no tonsillar, no preauricular, no posterior auricular and no occipital adenopathy present.        Head (left side): No submental, no submandibular, no tonsillar, no preauricular, no posterior auricular and no occipital adenopathy present.   Neurological: She is alert. She has normal strength. No sensory deficit.   Skin: Skin is warm, dry and intact. No cyanosis. Nails show no clubbing.       Assessment/Plan   Paula was seen today for depression and urinary frequency.    Diagnoses and all orders for this visit:    Moderate episode of recurrent major depressive disorder (CMS/HCC)    Urinary frequency  -     Urinalysis With Microscopic If Indicated (No Culture) - Urine, Clean Catch    Chronic bilateral low back pain without sciatica  -     cyclobenzaprine (FLEXERIL) 10 MG tablet; Take 1 tablet by mouth 3 (Three) Times a Day As Needed for Muscle Spasms.    Screening for colorectal cancer  -     Occult Blood, Fecal By Immunoassay - Stool, Per Rectum; Future  -     Occult Blood, Fecal By Immunoassay - Stool, Per Rectum    1.  She is doing well with Pristiq and feels medication has been helpful.  She will continue current medication and will follow-up in 6 months.  2.  Her urine done in the office today is negative for an infection.  Symptoms are suggestive of an overactive bladder.  We discussed medication options including Ditropan which she has declined for now.  Continue to monitor.  3.  She complains of  intermittent low back pain with muscle spasms and tightness.  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  She may take Flexeril as needed for muscle spasms and pain.  Consider further evaluation if symptoms persist or worsen.  4.  She has returned her Hemoccult from her last visit which was negative for blood.

## 2020-10-09 DIAGNOSIS — F33.1 MODERATE EPISODE OF RECURRENT MAJOR DEPRESSIVE DISORDER (HCC): ICD-10-CM

## 2020-10-09 RX ORDER — MONTELUKAST SODIUM 10 MG/1
10 TABLET ORAL
Qty: 90 TABLET | Refills: 1 | Status: SHIPPED | OUTPATIENT
Start: 2020-10-09 | End: 2021-03-10

## 2020-10-09 NOTE — TELEPHONE ENCOUNTER
Caller: Paula Trevino    Relationship: Self    Best call back number: [phone]*    Medication needed:   Requested Prescriptions     Pending Prescriptions Disp Refills   • montelukast (SINGULAIR) 10 MG tablet 90 tablet 0     Sig: Take 1 tablet by mouth every night at bedtime.       When do you need the refill by: ASAP    What details did the patient provide when requesting the medication: PATIENT COMPLETELY OUT OF MEDICATION    Does the patient have less than a 3 day supply:  [x] Yes  [] No    What is the patient's preferred pharmacy: [address]) [place]  [address] [facility] [phone] [facility] [phone]

## 2020-11-25 ENCOUNTER — OFFICE VISIT (OUTPATIENT)
Dept: INTERNAL MEDICINE | Facility: CLINIC | Age: 60
End: 2020-11-25

## 2020-11-25 VITALS
HEART RATE: 75 BPM | BODY MASS INDEX: 32.1 KG/M2 | HEIGHT: 61 IN | TEMPERATURE: 98.2 F | WEIGHT: 170 LBS | SYSTOLIC BLOOD PRESSURE: 138 MMHG | DIASTOLIC BLOOD PRESSURE: 88 MMHG | OXYGEN SATURATION: 98 %

## 2020-11-25 DIAGNOSIS — M25.552 LEFT HIP PAIN: ICD-10-CM

## 2020-11-25 DIAGNOSIS — M54.50 CHRONIC BILATERAL LOW BACK PAIN WITHOUT SCIATICA: Primary | ICD-10-CM

## 2020-11-25 DIAGNOSIS — R03.0 ELEVATED BLOOD PRESSURE READING: ICD-10-CM

## 2020-11-25 DIAGNOSIS — G89.29 CHRONIC BILATERAL LOW BACK PAIN WITHOUT SCIATICA: Primary | ICD-10-CM

## 2020-11-25 PROCEDURE — 99213 OFFICE O/P EST LOW 20 MIN: CPT | Performed by: NURSE PRACTITIONER

## 2020-12-02 ENCOUNTER — TELEPHONE (OUTPATIENT)
Dept: INTERNAL MEDICINE | Facility: CLINIC | Age: 60
End: 2020-12-02

## 2020-12-02 NOTE — TELEPHONE ENCOUNTER
11/29/20    133/86    11/30/20    155/85    12/1/20    133/78    12/2/20    146/77     THESE ARE HER BLOOD PRESSURE READINGS. THIS NEEDS TO GO TO [facility].     PLEASE ADVISE  [phone]

## 2020-12-03 DIAGNOSIS — I10 ESSENTIAL HYPERTENSION: Primary | ICD-10-CM

## 2020-12-03 RX ORDER — LISINOPRIL 10 MG/1
10 TABLET ORAL DAILY
Qty: 30 TABLET | Refills: 1 | Status: SHIPPED | OUTPATIENT
Start: 2020-12-03 | End: 2021-03-10

## 2020-12-03 NOTE — TELEPHONE ENCOUNTER
Please notify pt her blood pressure readings are elevated and are in the range of hypertension. I would like to start her on a low dose blood pressure medication called Lisinopril. I will send this to her pharmacy. Please ask her to call with her blood pressure readings in 3-4 weeks. Thanks.

## 2020-12-09 ENCOUNTER — OFFICE VISIT (OUTPATIENT)
Dept: ORTHOPEDIC SURGERY | Facility: CLINIC | Age: 60
End: 2020-12-09

## 2020-12-09 VITALS — HEIGHT: 61 IN | WEIGHT: 170 LBS | BODY MASS INDEX: 32.1 KG/M2

## 2020-12-09 DIAGNOSIS — M25.552 LATERAL PAIN OF LEFT HIP: ICD-10-CM

## 2020-12-09 DIAGNOSIS — R52 PAIN: Primary | ICD-10-CM

## 2020-12-09 PROCEDURE — 99243 OFF/OP CNSLTJ NEW/EST LOW 30: CPT | Performed by: ORTHOPAEDIC SURGERY

## 2020-12-09 PROCEDURE — 73501 X-RAY EXAM HIP UNI 1 VIEW: CPT | Performed by: ORTHOPAEDIC SURGERY

## 2020-12-09 PROCEDURE — 20610 DRAIN/INJ JOINT/BURSA W/O US: CPT | Performed by: ORTHOPAEDIC SURGERY

## 2020-12-09 RX ORDER — METHYLPREDNISOLONE ACETATE 80 MG/ML
80 INJECTION, SUSPENSION INTRA-ARTICULAR; INTRALESIONAL; INTRAMUSCULAR; SOFT TISSUE
Status: COMPLETED | OUTPATIENT
Start: 2020-12-09 | End: 2020-12-09

## 2020-12-09 RX ADMIN — METHYLPREDNISOLONE ACETATE 80 MG: 80 INJECTION, SUSPENSION INTRA-ARTICULAR; INTRALESIONAL; INTRAMUSCULAR; SOFT TISSUE at 13:53

## 2020-12-09 NOTE — PROGRESS NOTES
"Patient Name: Paula Trevino   YOB: 1960  Referring Primary Care Physician: Nitza Mena APRN  BMI: Body mass index is 32.12 kg/m².    Chief Complaint:    Chief Complaint   Patient presents with   • Left Hip - Establish Care        HPI:     Paula Trevino is a 59 y.o. female who presents today for evaluation of   Chief Complaint   Patient presents with   • Left Hip - Establish Care   .  Patient is seen today complaining of \"left hip\" pain it hurts her laterally and radiates partially down she also has some low back pain but no known history of back problems it is intermittent and achy.      Subjective   Medications:   Home Medications:  Current Outpatient Medications on File Prior to Visit   Medication Sig   • albuterol sulfate  (90 Base) MCG/ACT inhaler INHALE 2 PUFFS BY MOUTH EVERY HOUR AS NEEDED FOR SHORTNESS OF BREATH   • lisinopril (PRINIVIL,ZESTRIL) 10 MG tablet Take 1 tablet by mouth Daily.   • Mometasone Furoate (Asmanex HFA) 200 MCG/ACT aerosol Inhale 1 puff Daily.   • montelukast (SINGULAIR) 10 MG tablet Take 1 tablet by mouth every night at bedtime.   • venlafaxine (EFFEXOR) 37.5 MG tablet Take 1 tablet by mouth 2 (Two) Times a Day.     No current facility-administered medications on file prior to visit.      Current Medications:  Scheduled Meds:  Continuous Infusions:No current facility-administered medications for this visit.     PRN Meds:.    I have reviewed the patient's medical history in detail and updated the computerized patient record.  Review and summarization of old records includes:    Past Medical History:   Diagnosis Date   • Depression    • Hyperlipidemia    • Infectious mononucleosis    • Pregnancy        • Seasonal allergies         Past Surgical History:   Procedure Laterality Date   • DILATATION AND CURETTAGE     • EYE SURGERY          Social History     Occupational History   • Not on file   Tobacco Use   • Smoking status: Never Smoker   • Smokeless " "tobacco: Never Used   Substance and Sexual Activity   • Alcohol use: No   • Drug use: Defer   • Sexual activity: Defer      Social History     Social History Narrative   • Not on file        Family History   Problem Relation Age of Onset   • Heart disease Father    • Parkinsonism Father    • Dementia Father    • Diabetes Sister    • Breast cancer Sister 53   • Asthma Brother        ROS: 14 point review of systems was performed and all other systems were reviewed and are negative except for documented findings in HPI and today's encounter.     Allergies:   Allergies   Allergen Reactions   • Ampicillin Rash   • Celecoxib Rash   • Doxycycline Rash   • Erythromycin Rash   • Meperidine Rash   • Sulfa Antibiotics Rash     Constitutional:  Denies fever, shaking or chills   Eyes:  Denies change in visual acuity   HENT:  Denies nasal congestion or sore throat   Respiratory:  Denies cough or shortness of breath   Cardiovascular:  Denies chest pain or severe LE edema   GI:  Denies abdominal pain, nausea, vomiting, bloody stools or diarrhea   Musculoskeletal:  Numbness, tingling, pain, or loss of motor function only as noted above in history of present illness.  : Denies painful urination or hematuria  Integument:  Denies rash, lesion or ulceration   Neurologic:  Denies headache or focal weakness  Endocrine:  Denies lymphadenopathy  Psych:  Denies confusion or change in mental status   Hem:  Denies active bleeding    OBJECTIVE:  Physical Exam: 59 y.o. female  Wt Readings from Last 3 Encounters:   12/09/20 77.1 kg (170 lb)   11/25/20 77.1 kg (170 lb)   10/02/20 76.7 kg (169 lb)     Ht Readings from Last 1 Encounters:   12/09/20 154.9 cm (61\")     Body mass index is 32.12 kg/m².  There were no vitals filed for this visit.  Vital signs reviewed.     General Appearance:    Alert, cooperative, in no acute distress                  Eyes: conjunctiva clear  ENT: external ears and nose atraumatic  CV: no peripheral edema  Resp: " normal respiratory effort  Skin: no rashes or wounds; normal turgor  Psych: mood and affect appropriate  Lymph: no nodes appreciated  Neuro: gross sensation intact  Vascular:  Palpable peripheral pulse in noted extremity  Musculoskeletal Extremities: Exam today shows pleasant lady is point tender trochanteric bursa Stinchfield is negative she has good range of motion she is moderately tender over sacroiliac joint with Mini testing she transfers and walks relatively well    Radiology:   AP of the hips lateral left hip taken the office today without comparison views readily available for pain shows mild to moderate arthritic change    Assessment:     ICD-10-CM ICD-9-CM   1. Pain  R52 780.96   2. Lateral pain of left hip  M25.552 719.45        Large Joint Arthrocentesis: L greater trochanteric bursa  Date/Time: 12/9/2020 1:53 PM  Consent given by: patient  Site marked: site marked  Timeout: Immediately prior to procedure a time out was called to verify the correct patient, procedure, equipment, support staff and site/side marked as required   Supporting Documentation  Indications: pain and joint swelling   Procedure Details  Location: hip - L greater trochanteric bursa  Preparation: Patient was prepped and draped in the usual sterile fashion  Needle gauge: 21.  Approach: anterolateral  Medications administered: 80 mg methylPREDNISolone acetate 80 MG/ML; 4 mL lidocaine (cardiac)  Patient tolerance: patient tolerated the procedure well with no immediate complications             Plan: The diagnosis(es), natural history, pathophysiology and treatment for diagnosis(es) were discussed. Opportunity given and questions answered.  Biomechanics of pertinent body areas discussed.  When appropriate, the use of ambulatory aids discussed.  Inflammation/pain control; with cold, heat, elevation and/or liniments discussed as appropriate  Cortisone Injection. See procedure note.  CONSULT: This Consult is done at the request of a  requesting provider to whom I will send this report with this rendered opinion.  If she fails to get better would try physical therapy and her hips and low back.  Is probably a trigger point coming out of her low back.  She requires additional injections work-up or treatment would recommend sending her to sports medicine who treat these conditions.    12/9/2020    Much of this encounter note is an electronic transcription/translation of spoken language to printed text. The electronic translation of spoken language may permit erroneous, or at times, nonsensical words or phrases to be inadvertently transcribed; Although I have reviewed the note for such errors, some may still exist

## 2021-02-12 ENCOUNTER — OFFICE VISIT (OUTPATIENT)
Dept: INTERNAL MEDICINE | Facility: CLINIC | Age: 61
End: 2021-02-12

## 2021-02-12 VITALS
HEIGHT: 61 IN | OXYGEN SATURATION: 99 % | BODY MASS INDEX: 32.66 KG/M2 | SYSTOLIC BLOOD PRESSURE: 128 MMHG | TEMPERATURE: 98 F | DIASTOLIC BLOOD PRESSURE: 84 MMHG | HEART RATE: 76 BPM | WEIGHT: 173 LBS

## 2021-02-12 DIAGNOSIS — M54.50 CHRONIC BILATERAL LOW BACK PAIN WITHOUT SCIATICA: ICD-10-CM

## 2021-02-12 DIAGNOSIS — G89.29 CHRONIC BILATERAL LOW BACK PAIN WITHOUT SCIATICA: ICD-10-CM

## 2021-02-12 DIAGNOSIS — R10.13 DYSPEPSIA: ICD-10-CM

## 2021-02-12 DIAGNOSIS — F33.1 MODERATE EPISODE OF RECURRENT MAJOR DEPRESSIVE DISORDER (HCC): ICD-10-CM

## 2021-02-12 DIAGNOSIS — I10 ESSENTIAL HYPERTENSION: Primary | ICD-10-CM

## 2021-02-12 PROCEDURE — 99214 OFFICE O/P EST MOD 30 MIN: CPT | Performed by: NURSE PRACTITIONER

## 2021-02-12 RX ORDER — OMEPRAZOLE 40 MG/1
40 CAPSULE, DELAYED RELEASE ORAL DAILY
Qty: 30 CAPSULE | Refills: 0 | Status: SHIPPED | OUTPATIENT
Start: 2021-02-12 | End: 2021-03-10

## 2021-02-12 NOTE — PROGRESS NOTES
"Chief Complaint  Hyperlipidemia, Hypertension, and Back Pain    Subjective          Paula Trevino presents to [facility] for f/u regarding HTN, chronic back and hip pain and depression.    She received a Cortisone injection in her left hip 12/2020 which she states has been helpful, still with intermittent low back pain.  She was started on Lisinopril at her last visit which she is tolerating well (denies side effects), unsure what home BP readings have been. She denies chest pain and/or shortness of breath.  She is managed on Venlafaxine for depression which has been helpful.      Review of Systems   HENT: Positive for congestion and postnasal drip.    Gastrointestinal: Negative for blood in stool, diarrhea and vomiting.        C/o epigastric pressure with indigestion and reflux   Musculoskeletal: Positive for arthralgias and back pain.   Psychiatric/Behavioral: Positive for decreased concentration and dysphoric mood. The patient is nervous/anxious.      Objective   Vital Signs:   /84 (BP Location: Left arm, Patient Position: Sitting, Cuff Size: Adult)   Pulse 76   Temp 98 °F (36.7 °C) (Oral)   Ht 154.9 cm (61\")   Wt 78.5 kg (173 lb)   SpO2 99%   BMI 32.69 kg/m²     Physical Exam  Constitutional:       Appearance: She is well-developed. She is not ill-appearing.   HENT:      Head: Normocephalic.      Right Ear: Hearing, tympanic membrane and external ear normal.      Left Ear: Hearing, tympanic membrane and external ear normal.      Nose: Nose normal. No nasal deformity, mucosal edema or rhinorrhea.      Right Sinus: No maxillary sinus tenderness or frontal sinus tenderness.      Left Sinus: No maxillary sinus tenderness or frontal sinus tenderness.      Mouth/Throat:      Dentition: Normal dentition.   Eyes:      General: Lids are normal.         Right eye: No discharge.         Left eye: No discharge.      Conjunctiva/sclera: Conjunctivae normal.      Right eye: No " exudate.     Left eye: No exudate.  Neck:      Musculoskeletal: Normal range of motion. No edema.      Thyroid: No thyroid mass or thyromegaly.      Vascular: No carotid bruit.      Trachea: Trachea normal.   Cardiovascular:      Rate and Rhythm: Regular rhythm.      Pulses: Normal pulses.      Heart sounds: Normal heart sounds. No murmur.   Pulmonary:      Effort: No respiratory distress.      Breath sounds: Normal breath sounds. No decreased breath sounds, wheezing, rhonchi or rales.   Abdominal:      General: Bowel sounds are normal.      Palpations: Abdomen is soft.      Tenderness: There is no abdominal tenderness.   Lymphadenopathy:      Head:      Right side of head: No submental, submandibular, tonsillar, preauricular, posterior auricular or occipital adenopathy.      Left side of head: No submental, submandibular, tonsillar, preauricular, posterior auricular or occipital adenopathy.   Skin:     General: Skin is warm and dry.      Nails: There is no clubbing.     Neurological:      Mental Status: She is alert.   Psychiatric:         Behavior: Behavior is cooperative.        Result Review :   The following data was reviewed by: JOSE Mueller on 02/12/2021:  Common labs    Common Labsle 6/18/20 6/18/20 6/18/20    1105 1105 1105   Glucose  103 (A)    BUN  12    Creatinine  1.00    eGFR Non  Am  56 (A)    eGFR African Am  68    Sodium  141    Potassium  4.0    Chloride  105    Calcium  9.3    Total Protein  6.5    Albumin  4.40    Total Bilirubin  0.5    Alkaline Phosphatase  79    AST (SGOT)  16    ALT (SGPT)  18    WBC 5.15     Hemoglobin 14.8     Hematocrit 42.9     Platelets 222     Total Cholesterol   209 (A)   Triglycerides   78   HDL Cholesterol   55   LDL Cholesterol    138 (A)   (A) Abnormal value            Data reviewed: Consultant notes ortho          Assessment and Plan    Diagnoses and all orders for this visit:    1. Essential hypertension (Primary)  Assessment &  Plan:  Hypertension is improving with treatment.  Continue current treatment regimen.  Dietary sodium restriction.  Weight loss.  Blood pressure will be reassessed at the next regular appointment.      2. Chronic bilateral low back pain without sciatica  Assessment & Plan:  We discussed PT and/or MRI of lumbar spine to further evaluate for possible epidurals. She will continue stretching exercises and monitor pain, consider further evaluation if sx persist/worsen.      3. Moderate episode of recurrent major depressive disorder (CMS/HCC)  Assessment & Plan:  Sx stable with Venlafaxine      4. Dyspepsia  Assessment & Plan:  She c/o dyspepsia with epigastric pressure, will begin Omeprazole for 6-8 weeks but consider further evaluation if sx persist. We also discussed avoiding trigger foods and/or eating late at night.    Orders:  -     omeprazole (priLOSEC) 40 MG capsule; Take 1 capsule by mouth Daily.  Dispense: 30 capsule; Refill: 0      Follow Up   Return in about 6 months (around 8/12/2021).  Patient was given instructions and counseling regarding her condition or for health maintenance advice. Please see specific information pulled into the AVS if appropriate.

## 2021-02-16 PROBLEM — J30.1 SEASONAL ALLERGIC RHINITIS DUE TO POLLEN: Chronic | Status: ACTIVE | Noted: 2019-05-06

## 2021-02-16 PROBLEM — E78.2 MIXED HYPERLIPIDEMIA: Chronic | Status: ACTIVE | Noted: 2019-05-06

## 2021-02-16 PROBLEM — K29.00 ACUTE GASTRITIS WITHOUT HEMORRHAGE: Status: ACTIVE | Noted: 2021-02-16

## 2021-02-16 PROBLEM — M54.50 CHRONIC BILATERAL LOW BACK PAIN WITHOUT SCIATICA: Status: ACTIVE | Noted: 2021-02-16

## 2021-02-16 PROBLEM — K29.00 ACUTE GASTRITIS WITHOUT HEMORRHAGE: Chronic | Status: ACTIVE | Noted: 2021-02-16

## 2021-02-16 PROBLEM — J45.20 ASTHMA, MILD INTERMITTENT: Chronic | Status: ACTIVE | Noted: 2019-05-06

## 2021-02-16 PROBLEM — G89.29 CHRONIC BILATERAL LOW BACK PAIN WITHOUT SCIATICA: Status: ACTIVE | Noted: 2021-02-16

## 2021-02-16 PROBLEM — R10.13 DYSPEPSIA: Status: ACTIVE | Noted: 2021-02-16

## 2021-02-16 PROBLEM — M54.50 CHRONIC BILATERAL LOW BACK PAIN WITHOUT SCIATICA: Chronic | Status: ACTIVE | Noted: 2021-02-16

## 2021-02-16 PROBLEM — I10 ESSENTIAL HYPERTENSION: Chronic | Status: ACTIVE | Noted: 2021-02-16

## 2021-02-16 PROBLEM — G89.29 CHRONIC BILATERAL LOW BACK PAIN WITHOUT SCIATICA: Chronic | Status: ACTIVE | Noted: 2021-02-16

## 2021-02-16 PROBLEM — I10 ESSENTIAL HYPERTENSION: Status: ACTIVE | Noted: 2021-02-16

## 2021-02-16 NOTE — ASSESSMENT & PLAN NOTE
We discussed PT and/or MRI of lumbar spine to further evaluate for possible epidurals. She will continue stretching exercises and monitor pain, consider further evaluation if sx persist/worsen.

## 2021-02-16 NOTE — ASSESSMENT & PLAN NOTE
She c/o dyspepsia with epigastric pressure, will begin Omeprazole for 6-8 weeks but consider further evaluation if sx persist. We also discussed avoiding trigger foods and/or eating late at night.

## 2021-03-10 DIAGNOSIS — F33.1 MODERATE EPISODE OF RECURRENT MAJOR DEPRESSIVE DISORDER (HCC): ICD-10-CM

## 2021-03-10 DIAGNOSIS — I10 ESSENTIAL HYPERTENSION: ICD-10-CM

## 2021-03-10 DIAGNOSIS — R10.13 DYSPEPSIA: ICD-10-CM

## 2021-03-10 RX ORDER — MONTELUKAST SODIUM 10 MG/1
TABLET ORAL
Qty: 90 TABLET | Refills: 1 | Status: SHIPPED | OUTPATIENT
Start: 2021-03-10 | End: 2021-12-03

## 2021-03-10 RX ORDER — LISINOPRIL 10 MG/1
TABLET ORAL
Qty: 90 TABLET | Refills: 1 | Status: SHIPPED | OUTPATIENT
Start: 2021-03-10 | End: 2021-09-24

## 2021-03-10 RX ORDER — OMEPRAZOLE 40 MG/1
CAPSULE, DELAYED RELEASE ORAL
Qty: 90 CAPSULE | Refills: 1 | OUTPATIENT
Start: 2021-03-10 | End: 2021-03-20

## 2021-03-15 ENCOUNTER — TELEPHONE (OUTPATIENT)
Dept: ONCOLOGY | Facility: CLINIC | Age: 61
End: 2021-03-15

## 2021-03-15 RX ORDER — CLINDAMYCIN HYDROCHLORIDE 150 MG/1
150 CAPSULE ORAL 3 TIMES DAILY
Qty: 30 CAPSULE | Refills: 0 | Status: CANCELLED | OUTPATIENT
Start: 2021-03-15

## 2021-03-16 NOTE — PROGRESS NOTES
Patient's dentist called requesting information about drug allergies (patient is at office and is in need of an antibiotic)--notified office of drug allergies.

## 2021-03-19 ENCOUNTER — TELEPHONE (OUTPATIENT)
Dept: INTERNAL MEDICINE | Facility: CLINIC | Age: 61
End: 2021-03-19

## 2021-03-19 NOTE — TELEPHONE ENCOUNTER
Caller: Paula Trevino    Relationship: Self    Best call back number: [phone]    What medication are you requesting: MEDIATION FOR SUSPECTED UTI     What are your current symptoms: BURNING IN VAGINA, FREQUENT URINATION, NAUSEA,     How long have you been experiencing symptoms: SINCE 03/18/2021    Have you had these symptoms before:    [x] Yes  [] No    Have you been treated for these symptoms before:   [x] Yes  [] No    If a prescription is needed, what is your preferred pharmacy and phone number: [address] ([place]), [place] - [address] [facility] [phone] [facility] [phone]      Additional notes: PATIENT STATES SHE IS ON AN ANTIBIOTIC AND THINKS SHE MAY HAVE A YEAST INFECTION OR URINARY TRACT INFECTION

## 2021-03-19 NOTE — TELEPHONE ENCOUNTER
Pt advised to go to her closest immediate/urgent care center-[place] and the other providers are completely booked

## 2021-03-22 ENCOUNTER — BULK ORDERING (OUTPATIENT)
Dept: CASE MANAGEMENT | Facility: OTHER | Age: 61
End: 2021-03-22

## 2021-03-22 DIAGNOSIS — Z23 IMMUNIZATION DUE: ICD-10-CM

## 2021-05-27 ENCOUNTER — OFFICE VISIT (OUTPATIENT)
Dept: INTERNAL MEDICINE | Facility: CLINIC | Age: 61
End: 2021-05-27

## 2021-05-27 ENCOUNTER — TELEPHONE (OUTPATIENT)
Dept: INTERNAL MEDICINE | Facility: CLINIC | Age: 61
End: 2021-05-27

## 2021-05-27 VITALS
TEMPERATURE: 97.8 F | HEIGHT: 61 IN | DIASTOLIC BLOOD PRESSURE: 80 MMHG | BODY MASS INDEX: 31.91 KG/M2 | OXYGEN SATURATION: 99 % | HEART RATE: 70 BPM | WEIGHT: 169 LBS | SYSTOLIC BLOOD PRESSURE: 122 MMHG

## 2021-05-27 DIAGNOSIS — R30.0 DYSURIA: ICD-10-CM

## 2021-05-27 DIAGNOSIS — N30.00 ACUTE CYSTITIS WITHOUT HEMATURIA: Primary | ICD-10-CM

## 2021-05-27 LAB
BACTERIA UR QL AUTO: ABNORMAL /HPF
HYALINE CASTS UR QL AUTO: ABNORMAL /LPF
MUCOUS THREADS URNS QL MICRO: ABNORMAL /HPF
RBC # UR: ABNORMAL /HPF
REF LAB TEST METHOD: ABNORMAL
SQUAMOUS #/AREA URNS HPF: ABNORMAL /HPF
WBC UR QL AUTO: ABNORMAL /HPF

## 2021-05-27 PROCEDURE — 81015 MICROSCOPIC EXAM OF URINE: CPT | Performed by: NURSE PRACTITIONER

## 2021-05-27 PROCEDURE — 99213 OFFICE O/P EST LOW 20 MIN: CPT | Performed by: NURSE PRACTITIONER

## 2021-05-27 RX ORDER — CIPROFLOXACIN 500 MG/1
500 TABLET, FILM COATED ORAL EVERY 12 HOURS SCHEDULED
Qty: 14 TABLET | Refills: 0 | Status: SHIPPED | OUTPATIENT
Start: 2021-05-27 | End: 2021-06-03

## 2021-05-27 RX ORDER — PHENAZOPYRIDINE HYDROCHLORIDE 200 MG/1
200 TABLET, FILM COATED ORAL 3 TIMES DAILY PRN
Qty: 6 TABLET | Refills: 0 | Status: SHIPPED | OUTPATIENT
Start: 2021-05-27 | End: 2021-05-28 | Stop reason: SDUPTHER

## 2021-05-27 NOTE — TELEPHONE ENCOUNTER
UNABLE TO WARM TRANSFER    Caller: Paula Trevino    Relationship to patient: Self    Best call back number: [phone]    Patient is needing: PATIENT IS HOPING TO GET IN TODAY FOR PAINFUL AND FREQUENT URINATION. PLEASE CALL.

## 2021-05-27 NOTE — PROGRESS NOTES
"Chief Complaint  Difficulty Urinating    Subjective          Paula Trevino presents to [facility] due to myalgias with urinary frequency and dysuria.    She presents due to intermittent lower abdominal pain since last week with dysuria and urinary frequency over the past day. She c/o diffuse body aches with bilateral leg and knee pain yesterday. Denies fever or chills. She c/o mild nausea, no vomiting. She has taken Azo with some improvement in sx.  She was treated at Urgent Care 3/2021 for an acute UTI with Macrobid, symptoms resolved.      Objective   Vital Signs:   /80 (BP Location: Left arm, Patient Position: Sitting, Cuff Size: Adult)   Pulse 70   Temp 97.8 °F (36.6 °C) (Temporal)   Ht 154.9 cm (61\")   Wt 76.7 kg (169 lb)   SpO2 99%   BMI 31.93 kg/m²     Physical Exam  Constitutional:       Appearance: She is well-developed. She is not ill-appearing.   HENT:      Head: Normocephalic.      Right Ear: Hearing, tympanic membrane and external ear normal.      Left Ear: Hearing, tympanic membrane and external ear normal.      Nose: Nose normal. No nasal deformity, mucosal edema or rhinorrhea.      Right Sinus: No maxillary sinus tenderness or frontal sinus tenderness.      Left Sinus: No maxillary sinus tenderness or frontal sinus tenderness.      Mouth/Throat:      Dentition: Normal dentition.   Eyes:      General: Lids are normal.         Right eye: No discharge.         Left eye: No discharge.      Conjunctiva/sclera: Conjunctivae normal.      Right eye: No exudate.     Left eye: No exudate.  Neck:      Thyroid: No thyroid mass or thyromegaly.      Vascular: No carotid bruit.      Trachea: Trachea normal.   Cardiovascular:      Rate and Rhythm: Regular rhythm.      Pulses: Normal pulses.      Heart sounds: Normal heart sounds. No murmur heard.     Pulmonary:      Effort: No respiratory distress.      Breath sounds: Normal breath sounds. No decreased breath sounds, " wheezing, rhonchi or rales.   Abdominal:      Tenderness: There is abdominal tenderness in the right lower quadrant and left lower quadrant. There is no right CVA tenderness, left CVA tenderness, guarding or rebound.   Musculoskeletal:      Cervical back: Normal range of motion. No edema.   Lymphadenopathy:      Head:      Right side of head: No submental, submandibular, tonsillar, preauricular, posterior auricular or occipital adenopathy.      Left side of head: No submental, submandibular, tonsillar, preauricular, posterior auricular or occipital adenopathy.   Skin:     General: Skin is warm and dry.      Nails: There is no clubbing.   Neurological:      Mental Status: She is alert.   Psychiatric:         Behavior: Behavior is cooperative.        Result Review :   The following data was reviewed by: JOSE Mueller on 05/27/2021:  Common labs    Common Labsle 6/18/20 6/18/20 6/18/20    1105 1105 1105   Glucose  103 (A)    BUN  12    Creatinine  1.00    eGFR Non  Am  56 (A)    eGFR African Am  68    Sodium  141    Potassium  4.0    Chloride  105    Calcium  9.3    Total Protein  6.5    Albumin  4.40    Total Bilirubin  0.5    Alkaline Phosphatase  79    AST (SGOT)  16    ALT (SGPT)  18    WBC 5.15     Hemoglobin 14.8     Hematocrit 42.9     Platelets 222     Total Cholesterol   209 (A)   Triglycerides   78   HDL Cholesterol   55   LDL Cholesterol    138 (A)   (A) Abnormal value                      Assessment and Plan    Diagnoses and all orders for this visit:    1. Acute cystitis without hematuria (Primary)  -     ciprofloxacin (Cipro) 500 MG tablet; Take 1 tablet by mouth Every 12 (Twelve) Hours for 7 days.  Dispense: 14 tablet; Refill: 0  -     phenazopyridine (Pyridium) 200 MG tablet; Take 1 tablet by mouth 3 (Three) Times a Day As Needed for Bladder Spasms for up to 2 days.  Dispense: 6 tablet; Refill: 0  -     Urine Culture - Urine, Urine, Clean Catch    2. Dysuria  -     Cancel: Urinalysis  With Microscopic If Indicated (No Culture) - Urine, Clean Catch  -     Urinalysis, Microscopic Only - Urine, Clean Catch; Future  -     Urinalysis, Microscopic Only - Urine, Clean Catch  -     Urine Culture - Urine, Urine, Clean Catch      UA +WBCs, bacteria-will start antibiotic and send urine for culture. She is advised to increase clear fluids and minimize caffeine use. Consider further evaluation if sx continue to recur.      Follow Up   Return if symptoms worsen or fail to improve, for Next scheduled follow up.  Patient was given instructions and counseling regarding her condition or for health maintenance advice. Please see specific information pulled into the AVS if appropriate.

## 2021-05-28 DIAGNOSIS — N30.00 ACUTE CYSTITIS WITHOUT HEMATURIA: ICD-10-CM

## 2021-05-28 RX ORDER — PHENAZOPYRIDINE HYDROCHLORIDE 100 MG/1
100 TABLET, FILM COATED ORAL 3 TIMES DAILY PRN
Qty: 30 TABLET | Refills: 0 | Status: CANCELLED | OUTPATIENT
Start: 2021-05-28

## 2021-05-28 RX ORDER — PHENAZOPYRIDINE HYDROCHLORIDE 200 MG/1
200 TABLET, FILM COATED ORAL 3 TIMES DAILY PRN
Qty: 30 TABLET | Refills: 0 | Status: SHIPPED | OUTPATIENT
Start: 2021-05-28 | End: 2021-06-07

## 2021-05-28 RX ORDER — NITROFURANTOIN 25; 75 MG/1; MG/1
100 CAPSULE ORAL 2 TIMES DAILY
Qty: 14 CAPSULE | Refills: 0 | Status: SHIPPED | OUTPATIENT
Start: 2021-05-28 | End: 2021-06-04

## 2021-05-28 NOTE — TELEPHONE ENCOUNTER
[facility] [address] [place] [address] ([place]), [place] - [address] [facility] - [phone]  - [phone] FX  [phone]    PATIENT STATED THAT THE CIPRO IS MAKING HER VERY SICK AND WANTED TO KNOW IF YOU COULD CHANGE THE ANTIBIOTIC TO SOMETHING DIFFERENT       SHE ALSO STATED THAT THE OTHER MEDICATION THAT WAS PRESCRIBED THE OTHER DAY WILL RUN OUT THIS WEEKEND, AND WANTED TO SEE IF YOU COULD GIVE HER REFILLS FOR THIS AS WELL.         PLEASE ADVISE    Paula Trevino (Self) [phone] (H)

## 2021-05-29 LAB
BACTERIA UR CULT: ABNORMAL
OTHER ANTIBIOTIC SUSC ISLT: ABNORMAL

## 2021-05-31 ENCOUNTER — HOSPITAL ENCOUNTER (EMERGENCY)
Facility: HOSPITAL | Age: 61
Discharge: HOME | End: 2021-05-31
Attending: EMERGENCY MEDICINE | Admitting: EMERGENCY MEDICINE

## 2021-05-31 ENCOUNTER — APPOINTMENT (OUTPATIENT)
Dept: GENERAL RADIOLOGY | Facility: HOSPITAL | Age: 61
End: 2021-05-31

## 2021-05-31 VITALS
HEART RATE: 71 BPM | OXYGEN SATURATION: 98 % | WEIGHT: 169 LBS | HEIGHT: 59 IN | DIASTOLIC BLOOD PRESSURE: 67 MMHG | BODY MASS INDEX: 34.07 KG/M2 | RESPIRATION RATE: 16 BRPM | SYSTOLIC BLOOD PRESSURE: 132 MMHG | TEMPERATURE: 97.5 F

## 2021-05-31 DIAGNOSIS — S61.051A DOG BITE OF RIGHT THUMB, INITIAL ENCOUNTER: Primary | ICD-10-CM
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
## 2021-05-31 PROCEDURE — 90715 TDAP VACCINE 7 YRS/> IM: CPT | Performed by: NURSE PRACTITIONER

## 2021-05-31 PROCEDURE — 90471 IMMUNIZATION ADMIN: CPT | Performed by: NURSE PRACTITIONER

## 2021-05-31 PROCEDURE — 25010000002 TDAP 5-2.5-18.5 LF-MCG/0.5 SUSPENSION: Performed by: NURSE PRACTITIONER

## 2021-05-31 PROCEDURE — 99283 EMERGENCY DEPT VISIT LOW MDM: CPT

## 2021-05-31 PROCEDURE — 63710000001 ONDANSETRON ODT 4 MG TABLET DISPERSIBLE: Performed by: NURSE PRACTITIONER

## 2021-05-31 PROCEDURE — 73130 X-RAY EXAM OF HAND: CPT

## 2021-05-31 RX ORDER — AMOXICILLIN AND CLAVULANATE POTASSIUM 875; 125 MG/1; MG/1
1 TABLET, FILM COATED ORAL EVERY 12 HOURS SCHEDULED
Status: DISCONTINUED | OUTPATIENT
Start: 2021-05-31 | End: 2021-05-31 | Stop reason: HOSPADM

## 2021-05-31 RX ORDER — ONDANSETRON 4 MG/1
4 TABLET, ORALLY DISINTEGRATING ORAL ONCE
Status: COMPLETED | OUTPATIENT
Start: 2021-05-31 | End: 2021-05-31

## 2021-05-31 RX ORDER — HYDROCODONE BITARTRATE AND ACETAMINOPHEN 5; 325 MG/1; MG/1
1 TABLET ORAL ONCE
Status: COMPLETED | OUTPATIENT
Start: 2021-05-31 | End: 2021-05-31

## 2021-05-31 RX ORDER — LIDOCAINE HYDROCHLORIDE AND EPINEPHRINE 10; 10 MG/ML; UG/ML
10 INJECTION, SOLUTION INFILTRATION; PERINEURAL ONCE
Status: COMPLETED | OUTPATIENT
Start: 2021-05-31 | End: 2021-05-31

## 2021-05-31 RX ORDER — AMOXICILLIN AND CLAVULANATE POTASSIUM 875; 125 MG/1; MG/1
1 TABLET, FILM COATED ORAL EVERY 12 HOURS
Qty: 14 TABLET | Refills: 0 | Status: SHIPPED | OUTPATIENT
Start: 2021-05-31 | End: 2021-07-23

## 2021-05-31 RX ADMIN — ONDANSETRON 4 MG: 4 TABLET, ORALLY DISINTEGRATING ORAL at 15:36

## 2021-05-31 RX ADMIN — AMOXICILLIN AND CLAVULANATE POTASSIUM 1 TABLET: 875; 125 TABLET, FILM COATED ORAL at 15:07

## 2021-05-31 RX ADMIN — HYDROCODONE BITARTRATE AND ACETAMINOPHEN 1 TABLET: 5; 325 TABLET ORAL at 16:23

## 2021-05-31 RX ADMIN — TETANUS TOXOID, REDUCED DIPHTHERIA TOXOID AND ACELLULAR PERTUSSIS VACCINE, ADSORBED 0.5 ML: 5; 2.5; 8; 8; 2.5 SUSPENSION INTRAMUSCULAR at 14:57

## 2021-05-31 RX ADMIN — LIDOCAINE HYDROCHLORIDE,EPINEPHRINE BITARTRATE 10 ML: 10; .01 INJECTION, SOLUTION INFILTRATION; PERINEURAL at 14:58

## 2021-06-19 ENCOUNTER — DOCUMENTATION (OUTPATIENT)
Dept: INTERNAL MEDICINE | Facility: CLINIC | Age: 61
End: 2021-06-19

## 2021-06-19 DIAGNOSIS — F33.1 MODERATE EPISODE OF RECURRENT MAJOR DEPRESSIVE DISORDER (HCC): ICD-10-CM

## 2021-06-19 RX ORDER — VENLAFAXINE 37.5 MG/1
37.5 TABLET ORAL 2 TIMES DAILY
Qty: 60 TABLET | Refills: 0 | Status: SHIPPED | OUTPATIENT
Start: 2021-06-19 | End: 2021-07-27 | Stop reason: SDUPTHER

## 2021-06-19 NOTE — PROGRESS NOTES
Patient called after-hours line stating that she had called a few days earlier to get a refill on her venlafaxine and the pharmacy did not get it and the pharmacy was about to close.  I went ahead and gave her 30-day refill due to the risk of withdrawal symptoms from this type of medication.

## 2021-07-20 ENCOUNTER — TELEPHONE (OUTPATIENT)
Dept: INTERNAL MEDICINE | Facility: CLINIC | Age: 61
End: 2021-07-20

## 2021-07-23 ENCOUNTER — OFFICE VISIT (OUTPATIENT)
Dept: INTERNAL MEDICINE | Facility: CLINIC | Age: 61
End: 2021-07-23

## 2021-07-23 VITALS
RESPIRATION RATE: 17 BRPM | WEIGHT: 167 LBS | HEART RATE: 69 BPM | TEMPERATURE: 97.6 F | HEIGHT: 59 IN | DIASTOLIC BLOOD PRESSURE: 75 MMHG | SYSTOLIC BLOOD PRESSURE: 116 MMHG | BODY MASS INDEX: 33.67 KG/M2 | OXYGEN SATURATION: 95 %

## 2021-07-23 DIAGNOSIS — R39.9 UTI SYMPTOMS: Primary | ICD-10-CM

## 2021-07-23 PROCEDURE — 81015 MICROSCOPIC EXAM OF URINE: CPT | Performed by: NURSE PRACTITIONER

## 2021-07-23 PROCEDURE — 99213 OFFICE O/P EST LOW 20 MIN: CPT | Performed by: NURSE PRACTITIONER

## 2021-07-23 RX ORDER — NITROFURANTOIN 25; 75 MG/1; MG/1
100 CAPSULE ORAL 2 TIMES DAILY
Qty: 14 CAPSULE | Refills: 0 | Status: SHIPPED | OUTPATIENT
Start: 2021-07-23 | End: 2021-07-30

## 2021-07-23 RX ORDER — PHENAZOPYRIDINE HYDROCHLORIDE 200 MG/1
200 TABLET, FILM COATED ORAL 3 TIMES DAILY PRN
Qty: 15 TABLET | Refills: 0 | Status: SHIPPED | OUTPATIENT
Start: 2021-07-23 | End: 2021-08-13

## 2021-07-23 RX ORDER — PHENAZOPYRIDINE HYDROCHLORIDE 200 MG/1
200 TABLET, FILM COATED ORAL 3 TIMES DAILY PRN
COMMUNITY
Start: 2021-06-14 | End: 2021-07-23 | Stop reason: SDUPTHER

## 2021-07-23 NOTE — PROGRESS NOTES
"Chief Complaint  Urinary Tract Infection (Pt presents here today with a possible UTI. x 4 days.)    Subjective          Paula Trevino presents to [facility]  Patient presents for evaluation of UTI symptoms x3 to 4 days.  This is a 60-year-old female patient of JOSE Salinas.  This patient is new to me.  She endorses urinary frequency, urgency and burning along with some bilateral lower back pain.  No fevers or chills.  She has mildly nauseous with no vomiting.  No constipation or diarrhea, no abdominal pain.  She has tried Pyridium which she had at home from a previous UTI which helped slightly.  No hematuria to her knowledge.  She last had a UTI on 5/27/2021 which was positive for E. coli.  She was treated successfully with Macrobid which she tolerated well.  She denies development of any other new issues today.      Objective   Vital Signs:   /75 (BP Location: Right arm, Patient Position: Sitting, Cuff Size: Adult)   Pulse 69   Temp 97.6 °F (36.4 °C)   Resp 17   Ht 149.9 cm (59\")   Wt 75.8 kg (167 lb)   SpO2 95%   BMI 33.73 kg/m²     Physical Exam  Vitals and nursing note reviewed.   Constitutional:       General: She is not in acute distress.     Appearance: Normal appearance. She is well-developed. She is not ill-appearing, toxic-appearing or diaphoretic.   HENT:      Head: Normocephalic and atraumatic.      Right Ear: External ear normal.      Left Ear: External ear normal.   Eyes:      Pupils: Pupils are equal, round, and reactive to light.   Neck:      Vascular: No carotid bruit.   Cardiovascular:      Rate and Rhythm: Normal rate and regular rhythm.      Pulses: Normal pulses.      Heart sounds: Normal heart sounds.      Comments: No peripheral edema  Pulmonary:      Effort: Pulmonary effort is normal. No respiratory distress.      Breath sounds: Normal breath sounds. No stridor. No wheezing, rhonchi or rales.   Chest:      Chest wall: No tenderness. "   Abdominal:      General: Bowel sounds are normal. There is no distension.      Palpations: Abdomen is soft. There is no mass.      Tenderness: There is no abdominal tenderness. There is no right CVA tenderness, left CVA tenderness, guarding or rebound.      Hernia: No hernia is present.   Musculoskeletal:         General: Normal range of motion.      Cervical back: Normal range of motion and neck supple. No rigidity or tenderness.   Lymphadenopathy:      Cervical: No cervical adenopathy.   Skin:     General: Skin is warm and dry.      Capillary Refill: Capillary refill takes less than 2 seconds.   Neurological:      General: No focal deficit present.      Mental Status: She is alert and oriented to person, place, and time. Mental status is at baseline.   Psychiatric:         Mood and Affect: Mood normal.         Behavior: Behavior normal.         Thought Content: Thought content normal.         Judgment: Judgment normal.        Result Review :   The following data was reviewed by: JOSE Zaldivar on 07/23/2021:    Office Visit with Nitza Mena APRN (05/27/2021)    Current outpatient and discharge medications have been reconciled for the patient.  Reviewed by: JOSE Zaldivar    Brief Urine Lab Results  (Last result in the past 365 days)      Color   Clarity   Blood   Leuk Est   Nitrite   Protein   CREAT   Urine HCG        03/20/21 1626 [place]  Comment:  pt taking AZO Clear Trace Large (3+) Positive 100 mg/dL                      Assessment and Plan    Diagnoses and all orders for this visit:    1. UTI symptoms (Primary)  -     Urinalysis With Microscopic - Urine, Clean Catch  -     Urine Culture - Urine, Urine, Clean Catch  -     phenazopyridine (PYRIDIUM) 200 MG tablet; Take 1 tablet by mouth 3 (Three) Times a Day As Needed for Bladder Spasms.  Dispense: 15 tablet; Refill: 0  -     nitrofurantoin, macrocrystal-monohydrate, (Macrobid) 100 MG capsule; Take 1 capsule by mouth 2 (Two) Times a Day for  7 days.  Dispense: 14 capsule; Refill: 0      Signs of UTI present on urine micro- sending epiric Macrobid- culture pending.     We will contact pt with urine culture results and further recommendations. Follow up PRN and routinely with PCP, JOSE Salinas.    Follow Up   No follow-ups on file.  Patient was given instructions and counseling regarding her condition or for health maintenance advice. Please see specific information pulled into the AVS if appropriate.

## 2021-07-27 DIAGNOSIS — F33.1 MODERATE EPISODE OF RECURRENT MAJOR DEPRESSIVE DISORDER (HCC): ICD-10-CM

## 2021-07-27 LAB
BACTERIA UR CULT: ABNORMAL
BACTERIA UR CULT: ABNORMAL
OTHER ANTIBIOTIC SUSC ISLT: ABNORMAL

## 2021-07-27 RX ORDER — VENLAFAXINE 37.5 MG/1
37.5 TABLET ORAL 2 TIMES DAILY
Qty: 60 TABLET | Refills: 1 | Status: SHIPPED | OUTPATIENT
Start: 2021-07-27 | End: 2021-11-18

## 2021-08-06 NOTE — PROGRESS NOTES
Patients sister called in his blood pressure reading for this morning  126/89   Subjective   Paula Trevino is a 59 y.o. female who presents due to low back pain radiating into her left hip.    She c/o low back pain and stiffness radiating into her left hip. She c/o GI intolerance to NSAIDs, developed hives with Celebrex. She is taking Tylenol as needed and applying Aspercreme with mild relief. She was seen in Urgent Care earlier this month and was started on muscle relaxers which were minimally effective.  She has seen Dr. Hawley with [facility] in the past for left hip bursitis and received an injection which she states was helpful.  She states pain is worse with prolonged standing/bending which unfortunately is required in her job. Denies paresthesias of lower extremities.    Back Pain  Pertinent negatives include no abdominal pain, chest pain, dysuria, fever, headaches or weakness.   Hip Pain          The following portions of the patient's history were reviewed and updated as appropriate: allergies, current medications, past social history and problem list.    Past Medical History:   Diagnosis Date   • Depression    • Hyperlipidemia    • Infectious mononucleosis    • Pregnancy        • Seasonal allergies          Current Outpatient Medications:   •  albuterol sulfate  (90 Base) MCG/ACT inhaler, INHALE 2 PUFFS BY MOUTH EVERY HOUR AS NEEDED FOR SHORTNESS OF BREATH, Disp: , Rfl:   •  Mometasone Furoate (Asmanex HFA) 200 MCG/ACT aerosol, Inhale 1 puff Daily., Disp: 1 inhaler, Rfl: 11  •  montelukast (SINGULAIR) 10 MG tablet, Take 1 tablet by mouth every night at bedtime., Disp: 90 tablet, Rfl: 1  •  venlafaxine (EFFEXOR) 37.5 MG tablet, Take 1 tablet by mouth 2 (Two) Times a Day., Disp: 60 tablet, Rfl: 11    Allergies   Allergen Reactions   • Ampicillin Rash   • Celecoxib Rash   • Doxycycline Rash   • Erythromycin Rash   • Meperidine Rash   • Sulfa Antibiotics Rash       Review of Systems   Constitutional: Negative for chills, fatigue, fever and unexpected weight change.  "  HENT: Negative for congestion, ear pain, postnasal drip, sinus pressure, sore throat and trouble swallowing.    Eyes: Negative for visual disturbance.   Respiratory: Negative for cough, chest tightness and wheezing.    Cardiovascular: Negative for chest pain, palpitations and leg swelling.   Gastrointestinal: Negative for abdominal pain, blood in stool, nausea and vomiting.   Genitourinary: Negative for dysuria, frequency and urgency.   Musculoskeletal: Positive for arthralgias and back pain. Negative for joint swelling.   Skin: Negative for color change.   Neurological: Negative for syncope, weakness and headaches.   Hematological: Does not bruise/bleed easily.       Objective   Vitals:    11/25/20 1540 11/25/20 1604   BP: 150/80 138/88   BP Location: Left arm Left arm   Patient Position: Sitting Sitting   Cuff Size: Adult Adult   Pulse: 75    Temp: 98.2 °F (36.8 °C)    TempSrc: Oral    SpO2: 98%    Weight: 77.1 kg (170 lb)    Height: 154.9 cm (61\")      Body mass index is 32.12 kg/m².  Physical Exam  Constitutional:       Appearance: She is well-developed. She is not ill-appearing.   HENT:      Head: Normocephalic.      Right Ear: Hearing, tympanic membrane and external ear normal.      Left Ear: Hearing, tympanic membrane and external ear normal.      Nose: Nose normal. No nasal deformity, mucosal edema or rhinorrhea.      Right Sinus: No maxillary sinus tenderness or frontal sinus tenderness.      Left Sinus: No maxillary sinus tenderness or frontal sinus tenderness.      Mouth/Throat:      Dentition: Normal dentition.   Eyes:      General: Lids are normal.         Right eye: No discharge.         Left eye: No discharge.      Conjunctiva/sclera: Conjunctivae normal.      Right eye: No exudate.     Left eye: No exudate.  Neck:      Musculoskeletal: Normal range of motion. No edema.      Thyroid: No thyroid mass or thyromegaly.      Vascular: No carotid bruit.      Trachea: Trachea normal.   Cardiovascular:      " Rate and Rhythm: Regular rhythm.      Pulses: Normal pulses.      Heart sounds: Normal heart sounds. No murmur.   Pulmonary:      Effort: No respiratory distress.      Breath sounds: Normal breath sounds. No decreased breath sounds, wheezing, rhonchi or rales.   Musculoskeletal:      Left hip: She exhibits tenderness (increased pain with abduction and adduction of left leg).      Lumbar back: She exhibits tenderness. She exhibits normal range of motion.   Lymphadenopathy:      Head:      Right side of head: No submental, submandibular, tonsillar, preauricular, posterior auricular or occipital adenopathy.      Left side of head: No submental, submandibular, tonsillar, preauricular, posterior auricular or occipital adenopathy.   Skin:     General: Skin is warm and dry.      Nails: There is no clubbing.     Neurological:      Mental Status: She is alert.      Sensory: Sensation is intact.      Motor: Motor function is intact.   Psychiatric:         Behavior: Behavior is cooperative.         Assessment/Plan   Diagnoses and all orders for this visit:    1. Chronic bilateral low back pain without sciatica (Primary)    2. Left hip pain    3. Elevated blood pressure reading    She was seen at Urgent Care and did not report improvement in sx with Baclofen, also states Flexeril was not helpful in the past. Intolerant to NSAIDs, experienced hives with Celebrex. She has declined Prednisone.  She may take Tylenol for pain control along with applying Aspercreme.  We discussed PT or chiro but she states it is cost prohibitive (co-pay with each visit). Discussed stretching exercises.  She requests referral back to Dr. Hawley where she has received hip injections in the past with good relief.  Her BP is elevated in the office today, advised to monitor and call with readings in 2-3 weeks.

## 2021-08-13 ENCOUNTER — OFFICE VISIT (OUTPATIENT)
Dept: INTERNAL MEDICINE | Facility: CLINIC | Age: 61
End: 2021-08-13

## 2021-08-13 VITALS
HEIGHT: 59 IN | DIASTOLIC BLOOD PRESSURE: 72 MMHG | HEART RATE: 72 BPM | OXYGEN SATURATION: 97 % | WEIGHT: 166.4 LBS | TEMPERATURE: 98 F | BODY MASS INDEX: 33.55 KG/M2 | SYSTOLIC BLOOD PRESSURE: 138 MMHG

## 2021-08-13 DIAGNOSIS — J30.89 NON-SEASONAL ALLERGIC RHINITIS, UNSPECIFIED TRIGGER: Primary | ICD-10-CM

## 2021-08-13 DIAGNOSIS — F33.1 MODERATE EPISODE OF RECURRENT MAJOR DEPRESSIVE DISORDER (HCC): Chronic | ICD-10-CM

## 2021-08-13 DIAGNOSIS — Z12.11 SCREENING FOR COLON CANCER: ICD-10-CM

## 2021-08-13 DIAGNOSIS — I10 ESSENTIAL HYPERTENSION: Chronic | ICD-10-CM

## 2021-08-13 DIAGNOSIS — J45.20 MILD INTERMITTENT ASTHMA WITHOUT COMPLICATION: Chronic | ICD-10-CM

## 2021-08-13 PROCEDURE — 99214 OFFICE O/P EST MOD 30 MIN: CPT | Performed by: NURSE PRACTITIONER

## 2021-08-13 NOTE — PROGRESS NOTES
"Chief Complaint  Follow-up (6 month follow up ), Chest Pain, Headache, Leg Swelling, Asthma, Hypertension, and Allergic Rhinitis    Subjective     {Problem List  Visit Diagnosis   Encounters  Notes  Medications  Labs  Result Review Imaging  Media :23}     Paula Trevino presents to [facility] for f/u regarding HTN, asthma and allergies.    She was treated for a UTI 7/2021, sx have resolved with no additional symptoms. She suffered a dog bite on her right thumb 6/2021, currently receiving PT for improved ROM.  She continues to c/o low back pain and stiffness without radicular sx, has exercises from PT.  She c/o intermittent left-sided chest wall tenderness for the past several years, worse with twisting/turning. Denies dyspnea.    Objective   Vital Signs:   /72 (BP Location: Left arm, Patient Position: Sitting, Cuff Size: Adult)   Pulse 72   Temp 98 °F (36.7 °C)   Ht 149.9 cm (59.02\")   Wt 75.5 kg (166 lb 6.4 oz)   SpO2 97%   BMI 33.59 kg/m²     Physical Exam  Constitutional:       Appearance: She is well-developed. She is not ill-appearing.   HENT:      Head: Normocephalic.      Right Ear: Hearing, tympanic membrane and external ear normal.      Left Ear: Hearing, tympanic membrane and external ear normal.      Nose: Nose normal. No nasal deformity, mucosal edema or rhinorrhea.      Right Sinus: No maxillary sinus tenderness or frontal sinus tenderness.      Left Sinus: No maxillary sinus tenderness or frontal sinus tenderness.      Mouth/Throat:      Dentition: Normal dentition.   Eyes:      General: Lids are normal.         Right eye: No discharge.         Left eye: No discharge.      Conjunctiva/sclera: Conjunctivae normal.      Right eye: No exudate.     Left eye: No exudate.  Neck:      Thyroid: No thyroid mass or thyromegaly.      Vascular: No carotid bruit.      Trachea: Trachea normal.   Cardiovascular:      Rate and Rhythm: Regular rhythm.      Pulses: " Normal pulses.      Heart sounds: Normal heart sounds. No murmur heard.     Pulmonary:      Effort: No respiratory distress.      Breath sounds: Normal breath sounds. No decreased breath sounds, wheezing, rhonchi or rales.   Abdominal:      General: Bowel sounds are normal.      Palpations: Abdomen is soft.      Tenderness: There is no abdominal tenderness.   Musculoskeletal:      Cervical back: Normal range of motion. No edema.   Lymphadenopathy:      Head:      Right side of head: No submental, submandibular, tonsillar, preauricular, posterior auricular or occipital adenopathy.      Left side of head: No submental, submandibular, tonsillar, preauricular, posterior auricular or occipital adenopathy.   Skin:     General: Skin is warm and dry.      Nails: There is no clubbing.   Neurological:      Mental Status: She is alert.   Psychiatric:         Behavior: Behavior is cooperative.        Result Review :{Labs  Result Review  Imaging  Med Tab  Media  Procedures :23}   The following data was reviewed by: JOSE Mueller on 08/13/2021:                Assessment and Plan    Diagnoses and all orders for this visit:    1. Non-seasonal allergic rhinitis, unspecified trigger (Primary)  Assessment & Plan:  Sx improved with daily Singulair, takes OTC antihistamine as needed      2. Essential hypertension  Assessment & Plan:  BP improved since starting Lisinopril which she is tolerating well. BP is mildly elevated today, continue medication along with low sodium diet. Continue home BP monitoring.      3. Moderate episode of recurrent major depressive disorder (CMS/HCC)  Assessment & Plan:  Sx stable with Venlafaxine which she is tolerating well.      4. Mild intermittent asthma without complication  Assessment & Plan:  Sx stable with Asmanex and Singulair daily, uses Albuterol inh as needed        5. Screening for colon cancer  -     Cologuard - Stool, Per Rectum    She has repeatedly declined a screening  colonoscopy, agrees to ColoGuard. She will check with insurance regarding coverage (information given).      Follow Up   Return in about 4 months (around 12/13/2021) for Annual physical.  Patient was given instructions and counseling regarding her condition or for health maintenance advice. Please see specific information pulled into the AVS if appropriate.

## 2021-08-15 PROBLEM — J30.89 NON-SEASONAL ALLERGIC RHINITIS: Status: ACTIVE | Noted: 2019-05-06

## 2021-08-15 PROBLEM — J30.89 NON-SEASONAL ALLERGIC RHINITIS: Chronic | Status: ACTIVE | Noted: 2019-05-06

## 2021-08-15 NOTE — ASSESSMENT & PLAN NOTE
BP improved since starting Lisinopril which she is tolerating well. BP is mildly elevated today, continue medication along with low sodium diet. Continue home BP monitoring.

## 2021-08-20 ENCOUNTER — TELEPHONE (OUTPATIENT)
Dept: INTERNAL MEDICINE | Facility: CLINIC | Age: 61
End: 2021-08-20

## 2021-08-20 NOTE — TELEPHONE ENCOUNTER
I would direct patient to urgent care if she cannot make it over her to leave urine culture. It will take 2-3 days to receive culture.

## 2021-08-20 NOTE — TELEPHONE ENCOUNTER
Caller: Paula Trevino    Relationship to patient: Self    Best call back number: [phone]    Patient is needing: PATIENT CALLED STATING THAT ABOUT 1 WK AGO, SHE FINISHED ROUND OF ANTIBIOTICS PRESCRIBED FOR UTI. PATIENT STATES THAT HER SYMPTOMS ARE BACK AND IS WONDERING WHAT  SHE NEEDS TO DO FROM HERE

## 2021-08-20 NOTE — TELEPHONE ENCOUNTER
Re-treat?  Have the pt come back in and leave another specimen or advise to go to urgent care since it's late in the afternoon?

## 2021-08-24 ENCOUNTER — TELEPHONE (OUTPATIENT)
Dept: INTERNAL MEDICINE | Facility: CLINIC | Age: 61
End: 2021-08-24

## 2021-08-24 NOTE — TELEPHONE ENCOUNTER
Caller: Paula Trevino    Relationship: Self    Best call back number: [phone]    What medication are you requesting: PHENAZOPYRIDINE AND MACROBID    What are your current symptoms: UTI    How long have you been experiencing symptoms: 4-5 DAYS    Have you had these symptoms before:    [x] Yes  [] No    Have you been treated for these symptoms before:   [x] Yes  [] No    If a prescription is needed, what is your preferred pharmacy and phone number: [address]), [place] [address] [facility] [phone] [facility] [phone]      Additional notes:  PATIENT STATES THAT SHE NEVER RETURNED TO URGENT CARE BECAUSE SHE DIDN'T HAVE THE $50.  SHE STATES THAT SHE IS STILL EXPERIENCING SYMPTOMS

## 2021-08-24 NOTE — TELEPHONE ENCOUNTER
"Scheduled patient with [facility] again for tomorrow due to availability.   Per 8/20 note.   \"I would direct patient to urgent care if she cannot make it over her to leave urine culture. It will take 2-3 days to receive culture. \"    Patient did not go to UC.   "

## 2021-08-25 ENCOUNTER — OFFICE VISIT (OUTPATIENT)
Dept: INTERNAL MEDICINE | Facility: CLINIC | Age: 61
End: 2021-08-25

## 2021-08-25 VITALS
DIASTOLIC BLOOD PRESSURE: 78 MMHG | WEIGHT: 165 LBS | OXYGEN SATURATION: 98 % | HEART RATE: 63 BPM | TEMPERATURE: 97.3 F | HEIGHT: 59 IN | BODY MASS INDEX: 33.26 KG/M2 | SYSTOLIC BLOOD PRESSURE: 118 MMHG

## 2021-08-25 DIAGNOSIS — N39.0 ACUTE UTI: Primary | ICD-10-CM

## 2021-08-25 DIAGNOSIS — R30.0 DYSURIA: ICD-10-CM

## 2021-08-25 LAB
BACTERIA UR QL AUTO: ABNORMAL /HPF
HYALINE CASTS UR QL AUTO: ABNORMAL /LPF
MUCOUS THREADS URNS QL MICRO: ABNORMAL /HPF
RBC # UR: ABNORMAL /HPF
REF LAB TEST METHOD: ABNORMAL
SQUAMOUS #/AREA URNS HPF: ABNORMAL /HPF
WBC CLUMPS # UR AUTO: ABNORMAL /HPF
WBC UR QL AUTO: ABNORMAL /HPF

## 2021-08-25 PROCEDURE — 81015 MICROSCOPIC EXAM OF URINE: CPT | Performed by: NURSE PRACTITIONER

## 2021-08-25 PROCEDURE — 99213 OFFICE O/P EST LOW 20 MIN: CPT | Performed by: NURSE PRACTITIONER

## 2021-08-25 RX ORDER — PHENAZOPYRIDINE HYDROCHLORIDE 200 MG/1
200 TABLET, FILM COATED ORAL 3 TIMES DAILY PRN
Qty: 6 TABLET | Refills: 0 | Status: SHIPPED | OUTPATIENT
Start: 2021-08-25 | End: 2021-08-27

## 2021-08-25 RX ORDER — NITROFURANTOIN 25; 75 MG/1; MG/1
100 CAPSULE ORAL EVERY 12 HOURS SCHEDULED
Qty: 14 CAPSULE | Refills: 0 | Status: SHIPPED | OUTPATIENT
Start: 2021-08-25 | End: 2021-09-01

## 2021-08-25 NOTE — PROGRESS NOTES
"Chief Complaint  Urinary Tract Infection    Subjective          Paula Trevino presents to [facility] due to urinary symptoms.    He c/o dysuria since Monday evening with urinary frequency and urgency, denies fever or chills. No hematuria. She c/o mild nausea without vaginal discharge and/or irritation.      Objective   Vital Signs:   /78 (BP Location: Left arm, Patient Position: Sitting, Cuff Size: Adult)   Pulse 63   Temp 97.3 °F (36.3 °C) (Temporal)   Ht 149.9 cm (59\")   Wt 74.8 kg (165 lb)   SpO2 98%   BMI 33.33 kg/m²     Physical Exam  Constitutional:       Appearance: She is well-developed. She is not ill-appearing.   HENT:      Head: Normocephalic.      Right Ear: Hearing, tympanic membrane and external ear normal.      Left Ear: Hearing, tympanic membrane and external ear normal.      Nose: Nose normal. No nasal deformity, mucosal edema or rhinorrhea.      Right Sinus: No maxillary sinus tenderness or frontal sinus tenderness.      Left Sinus: No maxillary sinus tenderness or frontal sinus tenderness.      Mouth/Throat:      Dentition: Normal dentition.   Eyes:      General: Lids are normal.         Right eye: No discharge.         Left eye: No discharge.      Conjunctiva/sclera: Conjunctivae normal.      Right eye: No exudate.     Left eye: No exudate.  Neck:      Thyroid: No thyroid mass or thyromegaly.      Vascular: No carotid bruit.      Trachea: Trachea normal.   Cardiovascular:      Rate and Rhythm: Regular rhythm.      Pulses: Normal pulses.      Heart sounds: Normal heart sounds. No murmur heard.     Pulmonary:      Effort: No respiratory distress.      Breath sounds: Normal breath sounds. No decreased breath sounds, wheezing, rhonchi or rales.   Abdominal:      General: Bowel sounds are normal.      Palpations: Abdomen is soft.      Tenderness: There is no abdominal tenderness.   Musculoskeletal:      Cervical back: Normal range of motion. No edema. "   Lymphadenopathy:      Head:      Right side of head: No submental, submandibular, tonsillar, preauricular, posterior auricular or occipital adenopathy.      Left side of head: No submental, submandibular, tonsillar, preauricular, posterior auricular or occipital adenopathy.   Skin:     General: Skin is warm and dry.      Nails: There is no clubbing.   Neurological:      Mental Status: She is alert.   Psychiatric:         Behavior: Behavior is cooperative.        Result Review :   The following data was reviewed by: JOSE Mueller on 08/25/2021:                Assessment and Plan    Diagnoses and all orders for this visit:    1. Acute UTI (Primary)  -     nitrofurantoin, macrocrystal-monohydrate, (MACROBID) 100 MG capsule; Take 1 capsule by mouth Every 12 (Twelve) Hours for 7 days.  Dispense: 14 capsule; Refill: 0  -     phenazopyridine (Pyridium) 200 MG tablet; Take 1 tablet by mouth 3 (Three) Times a Day As Needed for Bladder Spasms for up to 2 days.  Dispense: 6 tablet; Refill: 0  -     Urine Culture - Urine, Urine, Clean Catch    2. Dysuria  -     Cancel: Urinalysis With Microscopic If Indicated (No Culture) - Urine, Clean Catch  -     Urinalysis, Microscopic Only - Urine, Clean Catch; Future  -     Urinalysis, Microscopic Only - Urine, Clean Catch  -     Urine Culture - Urine, Urine, Clean Catch    UA +WBCs, bacteria-will start an antibiotic and send urine for culture. She will increase clear fluids.    She has verified with her insurance that ColoGuard is covered which was ordered at her last visit.      Follow Up   Return if symptoms worsen or fail to improve, for Next scheduled follow up.  Patient was given instructions and counseling regarding her condition or for health maintenance advice. Please see specific information pulled into the AVS if appropriate.

## 2021-08-29 LAB
BACTERIA UR CULT: ABNORMAL
BACTERIA UR CULT: ABNORMAL
OTHER ANTIBIOTIC SUSC ISLT: ABNORMAL

## 2021-09-09 ENCOUNTER — TELEPHONE (OUTPATIENT)
Dept: INTERNAL MEDICINE | Facility: CLINIC | Age: 61
End: 2021-09-09

## 2021-09-09 NOTE — TELEPHONE ENCOUNTER
PATIENT IS CALLING BECAUSE SHE HASN'T HEARD ANYTHING REGARDING HER LAB RESULTS AND SHE WAS ALSO SUPPOSED O SCHEDULE ANOTHER URINE TEST TO SEE IF THE UTI IS ACTUALLY GONE THIS TIME.     CALL: [phone]

## 2021-09-17 ENCOUNTER — TELEPHONE (OUTPATIENT)
Dept: INTERNAL MEDICINE | Facility: CLINIC | Age: 61
End: 2021-09-17

## 2021-09-17 NOTE — TELEPHONE ENCOUNTER
Caller: Paula Trevino    Relationship to patient: Self    Best call back number: [phone]    Patient is needing: PATIENT IS NEEDING TO RESCHEDULE LABS. ATTEMPTED TO WT BUT NO ANSWER

## 2021-09-23 ENCOUNTER — LAB (OUTPATIENT)
Dept: INTERNAL MEDICINE | Facility: CLINIC | Age: 61
End: 2021-09-23

## 2021-09-23 DIAGNOSIS — Z87.440 HX: UTI (URINARY TRACT INFECTION): ICD-10-CM

## 2021-09-23 LAB
BILIRUB UR QL STRIP: NEGATIVE
CLARITY UR: CLEAR
COLOR UR: YELLOW
GLUCOSE UR STRIP-MCNC: NEGATIVE MG/DL
HGB UR QL STRIP.AUTO: NEGATIVE
KETONES UR QL STRIP: NEGATIVE
LEUKOCYTE ESTERASE UR QL STRIP.AUTO: NEGATIVE
NITRITE UR QL STRIP: NEGATIVE
PH UR STRIP.AUTO: 5.5 [PH] (ref 5–8)
PROT UR QL STRIP: NEGATIVE
SP GR UR STRIP: 1.02 (ref 1–1.03)
UROBILINOGEN UR QL STRIP: NORMAL

## 2021-09-24 DIAGNOSIS — I10 ESSENTIAL HYPERTENSION: ICD-10-CM

## 2021-09-24 RX ORDER — LISINOPRIL 10 MG/1
TABLET ORAL
Qty: 90 TABLET | Refills: 0 | Status: SHIPPED | OUTPATIENT
Start: 2021-09-24 | End: 2021-12-21

## 2021-11-18 DIAGNOSIS — F33.1 MODERATE EPISODE OF RECURRENT MAJOR DEPRESSIVE DISORDER (HCC): ICD-10-CM

## 2021-11-18 RX ORDER — VENLAFAXINE 37.5 MG/1
TABLET ORAL
Qty: 60 TABLET | Refills: 3 | Status: SHIPPED | OUTPATIENT
Start: 2021-11-18 | End: 2022-02-25

## 2021-12-01 ENCOUNTER — OFFICE VISIT (OUTPATIENT)
Dept: INTERNAL MEDICINE | Facility: CLINIC | Age: 61
End: 2021-12-01

## 2021-12-01 VITALS
HEIGHT: 59 IN | DIASTOLIC BLOOD PRESSURE: 78 MMHG | HEART RATE: 72 BPM | OXYGEN SATURATION: 98 % | BODY MASS INDEX: 34.68 KG/M2 | WEIGHT: 172 LBS | TEMPERATURE: 97.5 F | SYSTOLIC BLOOD PRESSURE: 124 MMHG

## 2021-12-01 DIAGNOSIS — Z20.822 SUSPECTED COVID-19 VIRUS INFECTION: ICD-10-CM

## 2021-12-01 DIAGNOSIS — J06.9 VIRAL URI: Primary | ICD-10-CM

## 2021-12-01 PROCEDURE — 99213 OFFICE O/P EST LOW 20 MIN: CPT | Performed by: NURSE PRACTITIONER

## 2021-12-01 RX ORDER — GUAIFENESIN 600 MG/1
600 TABLET, EXTENDED RELEASE ORAL EVERY 12 HOURS SCHEDULED
Qty: 14 TABLET
Start: 2021-12-01 | End: 2021-12-08

## 2021-12-01 NOTE — PROGRESS NOTES
"Chief Complaint  URI    Subjective          Paula Trevino presents to [facility] who presents due to respiratory symptoms.    She c/o increased sinus pressure and drainage since Friday, denies fever or chills. She c/o a runny nose with clear drainage. She notes a cough at night which is mainly dry and not productive.       Objective   Vital Signs:   /78 (BP Location: Left arm, Patient Position: Sitting, Cuff Size: Adult)   Pulse 72   Temp 97.5 °F (36.4 °C) (Temporal)   Ht 149.9 cm (59\")   Wt 78 kg (172 lb)   SpO2 98%   BMI 34.74 kg/m²     Physical Exam  Constitutional:       Appearance: She is well-developed. She is not ill-appearing.   HENT:      Head: Normocephalic.      Right Ear: Hearing, tympanic membrane and external ear normal.      Left Ear: Hearing, tympanic membrane and external ear normal.      Nose: Nose normal. No nasal deformity, mucosal edema or rhinorrhea.      Right Sinus: No maxillary sinus tenderness or frontal sinus tenderness.      Left Sinus: No maxillary sinus tenderness or frontal sinus tenderness.      Mouth/Throat:      Dentition: Normal dentition.   Eyes:      General: Lids are normal.         Right eye: No discharge.         Left eye: No discharge.      Conjunctiva/sclera: Conjunctivae normal.      Right eye: No exudate.     Left eye: No exudate.  Neck:      Thyroid: No thyroid mass or thyromegaly.      Vascular: No carotid bruit.      Trachea: Trachea normal.   Cardiovascular:      Rate and Rhythm: Regular rhythm.      Pulses: Normal pulses.      Heart sounds: Normal heart sounds. No murmur heard.      Pulmonary:      Effort: No respiratory distress.      Breath sounds: Normal breath sounds. No decreased breath sounds, wheezing, rhonchi or rales.   Abdominal:      General: Bowel sounds are normal.      Palpations: Abdomen is soft.      Tenderness: There is no abdominal tenderness.   Musculoskeletal:      Cervical back: Normal range of motion. No " edema.   Lymphadenopathy:      Head:      Right side of head: No submental, submandibular, tonsillar, preauricular, posterior auricular or occipital adenopathy.      Left side of head: No submental, submandibular, tonsillar, preauricular, posterior auricular or occipital adenopathy.   Skin:     General: Skin is warm and dry.      Nails: There is no clubbing.   Neurological:      Mental Status: She is alert.   Psychiatric:         Behavior: Behavior is cooperative.        Result Review :   The following data was reviewed by: JOSE Mueller on 12/01/2021:                Assessment and Plan    Diagnoses and all orders for this visit:    1. Viral URI (Primary)  -     guaiFENesin (Mucinex) 600 MG 12 hr tablet; Take 1 tablet by mouth Every 12 (Twelve) Hours for 7 days.  Dispense: 14 tablet  -     dextromethorphan polistirex ER (Delsym) 30 MG/5ML Suspension Extended Release oral suspension; Take 10 mL by mouth At Night As Needed (cough) for up to 5 days.  Dispense: 280 mL; Refill: 0    2. Suspected COVID-19 virus infection  -     COVID-19,[facility] ROUTINE, NP/OP SWAB IN TRANSPORT MEDIA OR ESWAB 72 HR TAT - Swab, Anterior nasal    Other orders  -     COVID-19,[facility] ROUTINE, NP/OP SWAB IN TRANSPORT MEDIA OR ESWAB 72 HR TAT - ,  -     SARS-CoV-2, TY 2 DAY TAT - ,    Sx appear more viral in nature, samples of Mucinex given for bid dosing, may use Delsym at night for cough. She is also given Cepacol samples for sore throat. Increase clear fluids and rest.    COVID-19 test is also performed, she will self quarantine until results are received.      Follow Up   Return if symptoms worsen or fail to improve, for Next scheduled follow up.  Patient was given instructions and counseling regarding her condition or for health maintenance advice. Please see specific information pulled into the AVS if appropriate.

## 2021-12-03 ENCOUNTER — TELEPHONE (OUTPATIENT)
Dept: INTERNAL MEDICINE | Facility: CLINIC | Age: 61
End: 2021-12-03

## 2021-12-03 DIAGNOSIS — F33.1 MODERATE EPISODE OF RECURRENT MAJOR DEPRESSIVE DISORDER (HCC): ICD-10-CM

## 2021-12-03 LAB
LABCORP SARS-COV-2, NAA 2 DAY TAT: NORMAL
SARS-COV-2 RNA RESP QL NAA+PROBE: NOT DETECTED

## 2021-12-03 RX ORDER — MONTELUKAST SODIUM 10 MG/1
TABLET ORAL
Qty: 90 TABLET | Refills: 0 | Status: SHIPPED | OUTPATIENT
Start: 2021-12-03 | End: 2022-04-01

## 2021-12-03 NOTE — TELEPHONE ENCOUNTER
Hub staff attempted to follow warm transfer process and was unsuccessful     Caller: Paula Trevino    Relationship to patient: Self    Best call back number: [phone] (H)    Patient is needing: WOULD LIKE TO HAVE HER COVID RESULTS.    PLEASE CONTACT PATIENT TO ADVISE.        THANKS

## 2021-12-05 RX ORDER — DEXTROMETHORPHAN POLISTIREX 30 MG/5ML
60 SUSPENSION ORAL NIGHTLY PRN
Qty: 280 ML | Refills: 0 | COMMUNITY
Start: 2021-12-05 | End: 2021-12-10

## 2021-12-08 ENCOUNTER — TELEPHONE (OUTPATIENT)
Dept: INTERNAL MEDICINE | Facility: CLINIC | Age: 61
End: 2021-12-08

## 2021-12-08 DIAGNOSIS — Z00.00 PHYSICAL EXAM: Primary | ICD-10-CM

## 2021-12-08 LAB — REF LAB TEST METHOD: NORMAL

## 2021-12-08 NOTE — TELEPHONE ENCOUNTER
Caller: Uriel Paula    Relationship: Self    Best call back number: [phone]    What is the best time to reach you: ANYTIME    Who are you requesting to speak with (clinical staff, provider,  specific staff member):     What was the call regarding: PATIENT IS NEEDING TO RESCHEDULE HER LAB APPOINTMENT SHE HAS TODAY AT 10:15 AM.     PATIENT IS WANTING TO RESCHEDULE FOR TOMORROW IF POSSIBLE.     I ATTEMPTED TO WARM TRANSFER BUT WAS UNSUCCESSFUL.     PLEASE CALL PATIENT TO RESCHEDULE HER LABS.

## 2021-12-09 ENCOUNTER — LAB (OUTPATIENT)
Dept: INTERNAL MEDICINE | Facility: CLINIC | Age: 61
End: 2021-12-09

## 2021-12-09 DIAGNOSIS — Z00.00 PHYSICAL EXAM: Primary | ICD-10-CM

## 2021-12-09 LAB
BACTERIA UR QL AUTO: ABNORMAL /HPF
BILIRUB UR QL STRIP: NEGATIVE
CLARITY UR: CLEAR
COLOR UR: YELLOW
GLUCOSE UR STRIP-MCNC: NEGATIVE MG/DL
HGB UR QL STRIP.AUTO: ABNORMAL
HYALINE CASTS UR QL AUTO: ABNORMAL /LPF
KETONES UR QL STRIP: NEGATIVE
LEUKOCYTE ESTERASE UR QL STRIP.AUTO: NEGATIVE
MUCOUS THREADS URNS QL MICRO: ABNORMAL /HPF
NITRITE UR QL STRIP: NEGATIVE
PH UR STRIP.AUTO: 6 [PH] (ref 5–8)
PROT UR QL STRIP: NEGATIVE
RBC # UR STRIP: ABNORMAL /HPF
REF LAB TEST METHOD: ABNORMAL
SP GR UR STRIP: 1.02 (ref 1–1.03)
SQUAMOUS #/AREA URNS HPF: ABNORMAL /HPF
UROBILINOGEN UR QL STRIP: ABNORMAL
WBC # UR STRIP: ABNORMAL /HPF

## 2021-12-10 LAB
ALBUMIN SERPL-MCNC: 4.1 G/DL (ref 3.8–4.8)
ALBUMIN/GLOB SERPL: 2.1 {RATIO} (ref 1.2–2.2)
ALP SERPL-CCNC: 87 IU/L (ref 44–121)
ALT SERPL-CCNC: 16 IU/L (ref 0–32)
AST SERPL-CCNC: 17 IU/L (ref 0–40)
BILIRUB SERPL-MCNC: 0.7 MG/DL (ref 0–1.2)
BUN SERPL-MCNC: 14 MG/DL (ref 8–27)
BUN/CREAT SERPL: 18 (ref 12–28)
CALCIUM SERPL-MCNC: 8.8 MG/DL (ref 8.7–10.3)
CHLORIDE SERPL-SCNC: 103 MMOL/L (ref 96–106)
CHOLEST SERPL-MCNC: 201 MG/DL (ref 100–199)
CO2 SERPL-SCNC: 23 MMOL/L (ref 20–29)
CREAT SERPL-MCNC: 0.8 MG/DL (ref 0.57–1)
ERYTHROCYTE [DISTWIDTH] IN BLOOD BY AUTOMATED COUNT: 13 % (ref 11.7–15.4)
GLOBULIN SER CALC-MCNC: 2 G/DL (ref 1.5–4.5)
GLUCOSE SERPL-MCNC: 96 MG/DL (ref 65–99)
HCT VFR BLD AUTO: 39.4 % (ref 34–46.6)
HDLC SERPL-MCNC: 59 MG/DL
HGB BLD-MCNC: 13.6 G/DL (ref 11.1–15.9)
LDLC SERPL CALC-MCNC: 131 MG/DL (ref 0–99)
MCH RBC QN AUTO: 29.1 PG (ref 26.6–33)
MCHC RBC AUTO-ENTMCNC: 34.5 G/DL (ref 31.5–35.7)
MCV RBC AUTO: 84 FL (ref 79–97)
PLATELET # BLD AUTO: 212 X10E3/UL (ref 150–450)
POTASSIUM SERPL-SCNC: 4.2 MMOL/L (ref 3.5–5.2)
PROT SERPL-MCNC: 6.1 G/DL (ref 6–8.5)
RBC # BLD AUTO: 4.68 X10E6/UL (ref 3.77–5.28)
SODIUM SERPL-SCNC: 140 MMOL/L (ref 134–144)
TRIGL SERPL-MCNC: 59 MG/DL (ref 0–149)
TSH SERPL DL<=0.005 MIU/L-ACNC: 1.78 UIU/ML (ref 0.45–4.5)
VLDLC SERPL CALC-MCNC: 11 MG/DL (ref 5–40)
WBC # BLD AUTO: 4.1 X10E3/UL (ref 3.4–10.8)

## 2021-12-13 DIAGNOSIS — Z12.31 ENCOUNTER FOR SCREENING MAMMOGRAM FOR BREAST CANCER: Primary | ICD-10-CM

## 2021-12-14 ENCOUNTER — OFFICE VISIT (OUTPATIENT)
Dept: INTERNAL MEDICINE | Facility: CLINIC | Age: 61
End: 2021-12-14

## 2021-12-14 ENCOUNTER — APPOINTMENT (OUTPATIENT)
Dept: WOMENS IMAGING | Facility: HOSPITAL | Age: 61
End: 2021-12-14

## 2021-12-14 VITALS
WEIGHT: 169 LBS | DIASTOLIC BLOOD PRESSURE: 80 MMHG | SYSTOLIC BLOOD PRESSURE: 138 MMHG | OXYGEN SATURATION: 98 % | TEMPERATURE: 98.4 F | BODY MASS INDEX: 34.07 KG/M2 | HEART RATE: 87 BPM | HEIGHT: 59 IN

## 2021-12-14 DIAGNOSIS — J45.20 MILD INTERMITTENT ASTHMA WITHOUT COMPLICATION: Chronic | ICD-10-CM

## 2021-12-14 DIAGNOSIS — E78.2 MIXED HYPERLIPIDEMIA: Chronic | ICD-10-CM

## 2021-12-14 DIAGNOSIS — I10 ESSENTIAL HYPERTENSION: Chronic | ICD-10-CM

## 2021-12-14 DIAGNOSIS — G89.29 CHRONIC BILATERAL LOW BACK PAIN WITHOUT SCIATICA: Chronic | ICD-10-CM

## 2021-12-14 DIAGNOSIS — F33.1 MODERATE EPISODE OF RECURRENT MAJOR DEPRESSIVE DISORDER (HCC): Chronic | ICD-10-CM

## 2021-12-14 DIAGNOSIS — Z00.00 PHYSICAL EXAM: Primary | ICD-10-CM

## 2021-12-14 DIAGNOSIS — J30.89 NON-SEASONAL ALLERGIC RHINITIS, UNSPECIFIED TRIGGER: Chronic | ICD-10-CM

## 2021-12-14 DIAGNOSIS — Z12.31 ENCOUNTER FOR SCREENING MAMMOGRAM FOR BREAST CANCER: ICD-10-CM

## 2021-12-14 DIAGNOSIS — M54.50 CHRONIC BILATERAL LOW BACK PAIN WITHOUT SCIATICA: Chronic | ICD-10-CM

## 2021-12-14 PROCEDURE — 99396 PREV VISIT EST AGE 40-64: CPT | Performed by: NURSE PRACTITIONER

## 2021-12-14 PROCEDURE — 77067 SCR MAMMO BI INCL CAD: CPT | Performed by: RADIOLOGY

## 2021-12-14 PROCEDURE — 77067 SCR MAMMO BI INCL CAD: CPT | Performed by: NURSE PRACTITIONER

## 2021-12-14 NOTE — PROGRESS NOTES
Subjective   Paula Trevino is a 60 y.o. female who presents for a physical exam.    She reports worsening mood with recent loss of 2 of her animals.  She is currently managed on Effexor XR for depression. Denies SI.       The following portions of the patient's history were reviewed and updated as appropriate: allergies, current medications, past social history and problem list.    Past Medical History:   Diagnosis Date   • Depression    • Hyperlipidemia    • Infectious mononucleosis    • Pregnancy        • Seasonal allergies          Current Outpatient Medications:   •  albuterol sulfate  (90 Base) MCG/ACT inhaler, INHALE 2 PUFFS BY MOUTH EVERY HOUR AS NEEDED FOR SHORTNESS OF BREATH, Disp: , Rfl:   •  lisinopril (PRINIVIL,ZESTRIL) 10 MG tablet, Take 1 tablet by mouth once daily, Disp: 90 tablet, Rfl: 0  •  Mometasone Furoate (Asmanex HFA) 200 MCG/ACT aerosol, Inhale 1 puff Daily., Disp: 1 inhaler, Rfl: 11  •  montelukast (SINGULAIR) 10 MG tablet, TAKE 1 TABLET BY MOUTH ONCE DAILY AT BEDTIME, Disp: 90 tablet, Rfl: 0  •  venlafaxine (EFFEXOR) 37.5 MG tablet, Take 1 tablet by mouth twice daily, Disp: 60 tablet, Rfl: 3    Allergies   Allergen Reactions   • Ampicillin Rash   • Celecoxib Rash   • Doxycycline Rash   • Erythromycin Rash   • Meperidine Rash   • Sulfa Antibiotics Rash       Review of Systems   Constitutional: Negative for activity change, appetite change, chills, diaphoresis, fatigue, fever and unexpected weight change.   HENT: Positive for congestion and postnasal drip. Negative for dental problem, drooling, ear discharge, ear pain, facial swelling, hearing loss, mouth sores, nosebleeds, rhinorrhea, sinus pressure, sore throat, tinnitus and trouble swallowing.    Eyes: Negative for photophobia, pain, discharge, redness, itching and visual disturbance.   Respiratory: Negative for apnea, cough, choking, chest tightness, shortness of breath and wheezing.    Cardiovascular: Negative for chest pain,  "palpitations and leg swelling.        No orthopnea, PND, MENDOZA   Gastrointestinal: Negative for abdominal pain, blood in stool, constipation, diarrhea, nausea and vomiting.   Endocrine: Negative for cold intolerance, heat intolerance, polydipsia and polyuria.   Genitourinary: Negative for decreased urine volume, dysuria, enuresis, flank pain, frequency, hematuria and urgency.   Musculoskeletal: Positive for arthralgias and back pain ( Intermittent low back pain and stiffness). Negative for gait problem, joint swelling, myalgias, neck pain and neck stiffness.   Skin: Negative for color change and rash.        No hair changes, no nail changes   Allergic/Immunologic: Negative for environmental allergies, food allergies and immunocompromised state.   Neurological: Negative for dizziness, tremors, seizures, syncope, speech difficulty, weakness, light-headedness, numbness and headaches.   Hematological: Negative for adenopathy. Does not bruise/bleed easily.   Psychiatric/Behavioral: Positive for dysphoric mood. Negative for agitation, confusion, decreased concentration, sleep disturbance and suicidal ideas. The patient is nervous/anxious.        Objective   Vitals:    12/14/21 1433   BP: 138/80   BP Location: Left arm   Patient Position: Sitting   Cuff Size: Adult   Pulse: 87   Temp: 98.4 °F (36.9 °C)   TempSrc: Temporal   SpO2: 98%   Weight: 76.7 kg (169 lb)   Height: 149.9 cm (59\")     Body mass index is 34.13 kg/m².  Physical Exam  Constitutional:       General: She is not in acute distress.     Appearance: Normal appearance. She is not diaphoretic.   HENT:      Head: Normocephalic and atraumatic.      Right Ear: Tympanic membrane, ear canal and external ear normal.      Left Ear: Tympanic membrane, ear canal and external ear normal.      Nose: Nose normal. No rhinorrhea.      Mouth/Throat:      Mouth: Mucous membranes are moist.      Pharynx: Oropharynx is clear.   Eyes:      General:         Right eye: No discharge.    "      Left eye: No discharge.      Conjunctiva/sclera: Conjunctivae normal.   Cardiovascular:      Rate and Rhythm: Normal rate and regular rhythm.      Pulses: Normal pulses.      Heart sounds: Normal heart sounds.   Pulmonary:      Effort: Pulmonary effort is normal.      Breath sounds: Normal breath sounds.   Chest:   Breasts:      Right: Normal. No mass, nipple discharge, skin change or tenderness.      Left: Normal. No mass, nipple discharge, skin change or tenderness.       Abdominal:      General: Bowel sounds are normal.      Tenderness: There is no abdominal tenderness.   Musculoskeletal:         General: No swelling or tenderness.      Cervical back: Normal range of motion.   Skin:     General: Skin is warm and dry.   Neurological:      General: No focal deficit present.      Mental Status: She is alert and oriented to person, place, and time.   Psychiatric:         Mood and Affect: Mood normal.         Behavior: Behavior normal.         Judgment: Judgment normal.       Assessment/Plan   Diagnoses and all orders for this visit:    1. Physical exam (Primary)    2. Moderate episode of recurrent major depressive disorder (HCC)  Assessment & Plan:  She is currently managed on Effexor XR which she has tolerated well for the past year.  She does note worsening mood over the past couple of weeks which is more situational with recent death of a couple of pets.  She will continue to monitor notify office if symptoms worsen.      3. Chronic bilateral low back pain without sciatica  Assessment & Plan:  She has attended physical therapy in the past and has home stretches and exercises to perform.      4. Mild intermittent asthma without complication  Assessment & Plan:  She uses Asmanex as needed with infrequent albuterol use.        5. Non-seasonal allergic rhinitis, unspecified trigger  Assessment & Plan:  Symptoms currently managed on Singulair which she is tolerating well.  She also takes an over-the-counter  antihistamine intermittently as needed.  She notes increased postnasal drainage and sinus headache over the past few days.  She will begin Mucinex 600 mg twice daily and continue to monitor.      6. Mixed hyperlipidemia  Assessment & Plan:   with recent labs; continue low-fat, low-cholesterol diet along with regular exercise.      7. Essential hypertension  Assessment & Plan:  Hypertension is unchanged.  Continue current treatment regimen.  Dietary sodium restriction.  Blood pressure will be reassessed at the next regular appointment.  She will continue lisinopril which she is tolerating well.      Risk assessment:  She has a family hx (sister) of DM2 and breast cancer-her fasting glucose with recent labs was 96.  She receives regular mammograms.  Her Body mass index is 34.13 kg/m². - She is trying to make lifestyle changes with the goal of weight loss.  Discussed increasing activity level and following a low-fat, low-cholesterol diet.    Prevention:  She has not received her annual flu vaccine, declined. Tdap is current.  ColoGuard is current, repeat due in 2024.   Discussed Shingrix vaccine, she will check with pharmacy regarding coverage and availability.    Discussed healthy lifestyle choices such as maintaining a balanced diet low in carbohydrates and limiting caffeine and alcohol intake.  Recommended routine exercise for bone strength and cardiovascular health.

## 2021-12-15 PROBLEM — R10.13 DYSPEPSIA: Status: RESOLVED | Noted: 2021-02-16 | Resolved: 2021-12-15

## 2021-12-15 NOTE — ASSESSMENT & PLAN NOTE
She is currently managed on Effexor XR which she has tolerated well for the past year.  She does note worsening mood over the past couple of weeks which is more situational with recent death of a couple of pets.  She will continue to monitor notify office if symptoms worsen.

## 2021-12-15 NOTE — ASSESSMENT & PLAN NOTE
Symptoms currently managed on Singulair which she is tolerating well.  She also takes an over-the-counter antihistamine intermittently as needed.  She notes increased postnasal drainage and sinus headache over the past few days.  She will begin Mucinex 600 mg twice daily and continue to monitor.

## 2021-12-21 DIAGNOSIS — I10 ESSENTIAL HYPERTENSION: ICD-10-CM

## 2021-12-21 RX ORDER — LISINOPRIL 10 MG/1
TABLET ORAL
Qty: 90 TABLET | Refills: 3 | Status: SHIPPED | OUTPATIENT
Start: 2021-12-21 | End: 2023-01-11

## 2022-01-11 ENCOUNTER — OFFICE VISIT (OUTPATIENT)
Dept: INTERNAL MEDICINE | Facility: CLINIC | Age: 62
End: 2022-01-11

## 2022-01-11 VITALS
TEMPERATURE: 96.4 F | BODY MASS INDEX: 34.07 KG/M2 | DIASTOLIC BLOOD PRESSURE: 78 MMHG | HEART RATE: 77 BPM | OXYGEN SATURATION: 98 % | SYSTOLIC BLOOD PRESSURE: 118 MMHG | HEIGHT: 59 IN | WEIGHT: 169 LBS

## 2022-01-11 DIAGNOSIS — S39.012A ACUTE MYOFASCIAL STRAIN OF LUMBAR REGION, INITIAL ENCOUNTER: Primary | ICD-10-CM

## 2022-01-11 PROCEDURE — 99213 OFFICE O/P EST LOW 20 MIN: CPT | Performed by: NURSE PRACTITIONER

## 2022-01-11 RX ORDER — TIZANIDINE 4 MG/1
4 TABLET ORAL EVERY 8 HOURS PRN
Qty: 30 TABLET | Refills: 0 | Status: SHIPPED | OUTPATIENT
Start: 2022-01-11 | End: 2022-02-02

## 2022-01-11 RX ORDER — METHYLPREDNISOLONE 4 MG/1
TABLET ORAL
Qty: 21 TABLET | Refills: 0 | Status: SHIPPED | OUTPATIENT
Start: 2022-01-11 | End: 2022-01-17

## 2022-01-11 NOTE — PROGRESS NOTES
"Chief Complaint  Back Pain    Subjective          Paula Trevino presents to [facility] due to acute low back pain.    She presents due to increased low back pain since last Saturday evening without acute injury or trauma. However, she was bending and lifting repeatedly which may have triggered sx. Denies paresthesias of lower extremities and/or weakness. No change in bowel or bladder function.      Objective   Vital Signs:   /78 (BP Location: Left arm, Patient Position: Sitting, Cuff Size: Adult)   Pulse 77   Temp 96.4 °F (35.8 °C) (Temporal)   Ht 149.9 cm (59\")   Wt 76.7 kg (169 lb)   SpO2 98%   BMI 34.13 kg/m²     Physical Exam  Constitutional:       Appearance: She is well-developed. She is not ill-appearing.   HENT:      Head: Normocephalic.      Right Ear: Hearing, tympanic membrane and external ear normal.      Left Ear: Hearing, tympanic membrane and external ear normal.      Nose: Nose normal. No nasal deformity, mucosal edema or rhinorrhea.      Right Sinus: No maxillary sinus tenderness or frontal sinus tenderness.      Left Sinus: No maxillary sinus tenderness or frontal sinus tenderness.      Mouth/Throat:      Dentition: Normal dentition.   Eyes:      General: Lids are normal.         Right eye: No discharge.         Left eye: No discharge.      Conjunctiva/sclera: Conjunctivae normal.      Right eye: No exudate.     Left eye: No exudate.  Neck:      Thyroid: No thyroid mass or thyromegaly.      Vascular: No carotid bruit.      Trachea: Trachea normal.   Cardiovascular:      Rate and Rhythm: Regular rhythm.      Pulses: Normal pulses.      Heart sounds: Normal heart sounds. No murmur heard.      Pulmonary:      Effort: No respiratory distress.      Breath sounds: Normal breath sounds. No decreased breath sounds, wheezing, rhonchi or rales.   Musculoskeletal:      Cervical back: Normal range of motion. No edema.      Lumbar back: Spasms and tenderness present. " Negative right straight leg raise test and negative left straight leg raise test.   Lymphadenopathy:      Head:      Right side of head: No submental, submandibular, tonsillar, preauricular, posterior auricular or occipital adenopathy.      Left side of head: No submental, submandibular, tonsillar, preauricular, posterior auricular or occipital adenopathy.   Skin:     General: Skin is warm and dry.      Nails: There is no clubbing.   Neurological:      Mental Status: She is alert.   Psychiatric:         Behavior: Behavior is cooperative.        Result Review :   The following data was reviewed by: JOSE Mueller on 01/11/2022:  Common labs    Common Labsle 12/9/21 12/9/21 12/9/21    1010 1010 1010   Glucose  96    BUN  14    Creatinine  0.80    eGFR Non  Am  79    eGFR African Am  91    Sodium  140    Potassium  4.2    Chloride  103    Calcium  8.8    Total Protein  6.1    Albumin  4.1    Total Bilirubin  0.7    Alkaline Phosphatase  87    AST (SGOT)  17    ALT (SGPT)  16    WBC 4.1     Hemoglobin 13.6     Hematocrit 39.4     Platelets 212     Total Cholesterol   201 (A)   Triglycerides   59   HDL Cholesterol   59   LDL Cholesterol    131 (A)   (A) Abnormal value       Comments are available for some flowsheets but are not being displayed.                     Assessment and Plan    Diagnoses and all orders for this visit:    1. Acute myofascial strain of lumbar region, initial encounter (Primary)  -     methylPREDNISolone (Medrol) 4 MG dose pack; follow package directions  Dispense: 21 tablet; Refill: 0  -     tiZANidine (ZANAFLEX) 4 MG tablet; Take 1 tablet by mouth Every 8 (Eight) Hours As Needed for Muscle Spasms.  Dispense: 30 tablet; Refill: 0    Sx appear more muscular in nature and reflects an acute flare of chronic low back pain. She will treat acute inflammation with Medrol and muscle spasms with Zanaflex. RTC if sx persist and/or worsen (has been to PT in past and has stretching exercises at  home).      Follow Up   Return if symptoms worsen or fail to improve, for Next scheduled follow up.  Patient was given instructions and counseling regarding her condition or for health maintenance advice. Please see specific information pulled into the AVS if appropriate.

## 2022-02-02 ENCOUNTER — OFFICE VISIT (OUTPATIENT)
Dept: INTERNAL MEDICINE | Facility: CLINIC | Age: 62
End: 2022-02-02

## 2022-02-02 ENCOUNTER — TELEPHONE (OUTPATIENT)
Dept: INTERNAL MEDICINE | Facility: CLINIC | Age: 62
End: 2022-02-02

## 2022-02-02 VITALS
HEIGHT: 59 IN | DIASTOLIC BLOOD PRESSURE: 74 MMHG | BODY MASS INDEX: 34.07 KG/M2 | OXYGEN SATURATION: 99 % | SYSTOLIC BLOOD PRESSURE: 120 MMHG | HEART RATE: 76 BPM | TEMPERATURE: 97.3 F | WEIGHT: 169 LBS
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
## 2022-02-02 DIAGNOSIS — S39.012A ACUTE MYOFASCIAL STRAIN OF LUMBAR REGION, INITIAL ENCOUNTER: ICD-10-CM

## 2022-02-02 PROCEDURE — 99214 OFFICE O/P EST MOD 30 MIN: CPT | Performed by: NURSE PRACTITIONER

## 2022-02-02 RX ORDER — METHYLPREDNISOLONE 4 MG/1
TABLET ORAL
Qty: 21 TABLET | Refills: 0 | Status: SHIPPED | OUTPATIENT
Start: 2022-02-02 | End: 2022-02-08

## 2022-02-02 RX ORDER — METHOCARBAMOL 500 MG/1
500 TABLET, FILM COATED ORAL 4 TIMES DAILY
Qty: 30 TABLET | Refills: 0 | Status: SHIPPED | OUTPATIENT
Start: 2022-02-02 | End: 2022-04-07

## 2022-02-02 RX ORDER — MELOXICAM 15 MG/1
15 TABLET ORAL DAILY
Qty: 30 TABLET | Refills: 0 | Status: SHIPPED | OUTPATIENT
Start: 2022-02-02 | End: 2022-02-02

## 2022-02-02 NOTE — TELEPHONE ENCOUNTER
PATIENT WENT TO THE PHARMACY TO  HER MEDICATIONS, NOTICED WAS PRESCRIBED meloxicam (MOBIC) 15 MG tablet, AND THE PHARMACIST TOLD HER IS IN THE SAME DRUG GROUP AS CELEBREX, AND SHE HAS HAD HIVES WHEN TAKING CELEBREX, WANTED TO KNOW IF THAT IS CORRECT OR HAS TO BE REPLACED. PLEASE ADVISE    [phone]

## 2022-02-02 NOTE — PROGRESS NOTES
"Chief Complaint  Eye Pain (Right eye) and Back Pain    Subjective          Paula Trevino presents to [facility] due to low back pain and due to a cat scratch on her right eyelid.    She was playing with dogs last Thursday when she lost her balance and fell backwards, landing on her right side. She c/o increased low back pain since Sunday and stiffness since then without radicular sx. No change in bowel or bladder function.  She also presents due to a cat scratch on her right upper eyelid which happened while in bed Sunday evening, denies vision changes.      Objective   Vital Signs:   /74 (BP Location: Left arm, Patient Position: Sitting, Cuff Size: Adult)   Pulse 76   Temp 97.3 °F (36.3 °C) (Temporal)   Ht 149.9 cm (59\")   Wt 76.7 kg (169 lb)   SpO2 99%   BMI 34.13 kg/m²     Physical Exam  Vitals and nursing note reviewed.   Constitutional:       General: She is in acute distress (appears uncomfortable).      Appearance: She is well-developed.   HENT:      Head: Normocephalic.   Eyes:      Conjunctiva/sclera: Conjunctivae normal.      Comments: +abrasion right upper eyelid without drainage but surrounding ecchymosis   Cardiovascular:      Rate and Rhythm: Normal rate and regular rhythm.      Heart sounds: Normal heart sounds.   Pulmonary:      Effort: Pulmonary effort is normal.      Breath sounds: Normal breath sounds.   Abdominal:      General: There is no distension.      Palpations: Abdomen is soft.      Tenderness: There is no abdominal tenderness.   Musculoskeletal:      Cervical back: Normal range of motion.      Lumbar back: Spasms and tenderness present. No swelling. Decreased range of motion.      Comments: Moderate tenderness to palpation of paravertebral muscles of lumbar area     Skin:     General: Skin is warm and dry.      Findings: No erythema.   Neurological:      Mental Status: She is alert and oriented to person, place, and time.      Sensory: No sensory " deficit.      Motor: No abnormal muscle tone (5/5 normal muscle strength bilateral lower extremities. Strength and tone 5/5 in lower extremities (right quadriceps, left quadriceps, right tibialis anterior, left tibialis anterior)).      Coordination: Coordination normal.      Gait: Gait abnormal (Antalgic. Is able to do heel and toe walking bilaterally.).      Deep Tendon Reflexes:      Reflex Scores:       Patellar reflexes are 2+ on the right side and 2+ on the left side.       Achilles reflexes are 2+ on the right side and 2+ on the left side.     Comments: Negative straight leg raising test   Psychiatric:         Behavior: Behavior normal.        Result Review :   The following data was reviewed by: JOSE Mueller on 02/02/2022:  Common labs    Common Labsle 12/9/21 12/9/21 12/9/21    1010 1010 1010   Glucose  96    BUN  14    Creatinine  0.80    eGFR Non  Am  79    eGFR African Am  91    Sodium  140    Potassium  4.2    Chloride  103    Calcium  8.8    Total Protein  6.1    Albumin  4.1    Total Bilirubin  0.7    Alkaline Phosphatase  87    AST (SGOT)  17    ALT (SGPT)  16    WBC 4.1     Hemoglobin 13.6     Hematocrit 39.4     Platelets 212     Total Cholesterol   201 (A)   Triglycerides   59   HDL Cholesterol   59   LDL Cholesterol    131 (A)   (A) Abnormal value       Comments are available for some flowsheets but are not being displayed.                     Assessment and Plan    Diagnoses and all orders for this visit:    1. Cat scratch (Primary)    2. Acute myofascial strain of lumbar region, initial encounter  -     Discontinue: meloxicam (MOBIC) 15 MG tablet; Take 1 tablet by mouth Daily. Take with food  Dispense: 30 tablet; Refill: 0  -     methocarbamol (Robaxin) 500 MG tablet; Take 1 tablet by mouth 4 (Four) Times a Day.  Dispense: 30 tablet; Refill: 0  -     methylPREDNISolone (Medrol) 4 MG dose pack; follow package directions  Dispense: 21 tablet; Refill: 0    1. Cat scratch right  eyelid-no signs of infection and/or eye damage. She will apply warm compresses and continue to monitor.  2. Acute lumbar strain-will avoid MSAODs due to hx of Celebrex rash. Will treat acute inflammation with Medrol and begin muscle relaxers for spasms and tightness. Consider further evaluation if sx persist and/or worsen.      Follow Up   No follow-ups on file.  Patient was given instructions and counseling regarding her condition or for health maintenance advice. Please see specific information pulled into the AVS if appropriate.

## 2022-02-24 DIAGNOSIS — F33.1 MODERATE EPISODE OF RECURRENT MAJOR DEPRESSIVE DISORDER: ICD-10-CM

## 2022-02-25 RX ORDER — VENLAFAXINE 37.5 MG/1
TABLET ORAL
Qty: 180 TABLET | Refills: 0 | Status: SHIPPED | OUTPATIENT
Start: 2022-02-25 | End: 2022-12-07

## 2022-03-31 DIAGNOSIS — F33.1 MODERATE EPISODE OF RECURRENT MAJOR DEPRESSIVE DISORDER: ICD-10-CM

## 2022-04-01 RX ORDER — MONTELUKAST SODIUM 10 MG/1
TABLET ORAL
Qty: 90 TABLET | Refills: 0 | Status: SHIPPED | OUTPATIENT
Start: 2022-04-01 | End: 2022-07-14

## 2022-04-07 DIAGNOSIS — S39.012A ACUTE MYOFASCIAL STRAIN OF LUMBAR REGION, INITIAL ENCOUNTER: ICD-10-CM

## 2022-04-07 RX ORDER — METHOCARBAMOL 500 MG/1
TABLET, FILM COATED ORAL
Qty: 30 TABLET | Refills: 0 | Status: SHIPPED | OUTPATIENT
Start: 2022-04-07 | End: 2022-07-14

## 2022-05-25 ENCOUNTER — OFFICE VISIT (OUTPATIENT)
Dept: INTERNAL MEDICINE | Facility: CLINIC | Age: 62
End: 2022-05-25

## 2022-05-25 VITALS
SYSTOLIC BLOOD PRESSURE: 124 MMHG | HEART RATE: 77 BPM | OXYGEN SATURATION: 97 % | TEMPERATURE: 98.4 F | WEIGHT: 168 LBS | BODY MASS INDEX: 33.87 KG/M2 | HEIGHT: 59 IN | DIASTOLIC BLOOD PRESSURE: 80 MMHG

## 2022-05-25 DIAGNOSIS — G89.29 CHRONIC BILATERAL LOW BACK PAIN WITHOUT SCIATICA: Chronic | ICD-10-CM

## 2022-05-25 DIAGNOSIS — J45.20 MILD INTERMITTENT ASTHMA WITHOUT COMPLICATION: Chronic | ICD-10-CM

## 2022-05-25 DIAGNOSIS — F33.1 MODERATE EPISODE OF RECURRENT MAJOR DEPRESSIVE DISORDER: Chronic | ICD-10-CM

## 2022-05-25 DIAGNOSIS — M54.50 CHRONIC BILATERAL LOW BACK PAIN WITHOUT SCIATICA: Chronic | ICD-10-CM

## 2022-05-25 DIAGNOSIS — J30.89 NON-SEASONAL ALLERGIC RHINITIS, UNSPECIFIED TRIGGER: Chronic | ICD-10-CM

## 2022-05-25 DIAGNOSIS — I10 ESSENTIAL HYPERTENSION: Primary | Chronic | ICD-10-CM

## 2022-05-25 PROCEDURE — 99214 OFFICE O/P EST MOD 30 MIN: CPT | Performed by: NURSE PRACTITIONER

## 2022-05-25 RX ORDER — GABAPENTIN 300 MG/1
300 CAPSULE ORAL NIGHTLY
Qty: 30 CAPSULE | Refills: 0 | Status: SHIPPED | OUTPATIENT
Start: 2022-05-25 | End: 2022-12-27

## 2022-05-25 RX ORDER — METHYLPREDNISOLONE 4 MG/1
TABLET ORAL
Qty: 21 TABLET | Refills: 0 | Status: SHIPPED | OUTPATIENT
Start: 2022-05-25 | End: 2022-05-31

## 2022-05-30 NOTE — PROGRESS NOTES
"Chief Complaint  Back Pain (Started 1 week ago ), Hypertension, Allergies, and Depression    Subjective          Paula Trevino presents to [facility] for f/u regarding chronic low back pain, HTN and allergies.    She c/o increased low back pain over the past several days without an acute injury or trauma but has been repetitive lifting which may have triggered symptoms. No change in bowel or bladder function. She has a hx of recurrent back pain, has declined additional imaging due to the cost. She has taken Tylenol (does not tolerate NSAIDs) with little improvement in sx.      Objective   Vital Signs:  /80 (BP Location: Left arm, Patient Position: Sitting, Cuff Size: Adult)   Pulse 77   Temp 98.4 °F (36.9 °C) (Temporal)   Ht 149.9 cm (59\")   Wt 76.2 kg (168 lb)   SpO2 97%   BMI 33.93 kg/m²   BMI has not been calculated during today's encounter.       Physical Exam  Vitals and nursing note reviewed.   Constitutional:       General: She is not in acute distress (appears uncomfortable).     Appearance: She is well-developed.   HENT:      Head: Normocephalic.   Eyes:      Conjunctiva/sclera: Conjunctivae normal.   Cardiovascular:      Rate and Rhythm: Normal rate and regular rhythm.      Heart sounds: Normal heart sounds.   Pulmonary:      Effort: Pulmonary effort is normal.      Breath sounds: Normal breath sounds.   Abdominal:      General: There is no distension.      Palpations: Abdomen is soft.      Tenderness: There is no abdominal tenderness.   Musculoskeletal:      Cervical back: Normal range of motion.      Lumbar back: Spasms and tenderness present. No swelling. Decreased range of motion.      Comments: Moderate tenderness to palpation of paravertebral muscles of lumbar area     Skin:     General: Skin is warm and dry.      Findings: No erythema.   Neurological:      Mental Status: She is alert and oriented to person, place, and time.      Sensory: No sensory deficit. "      Motor: No abnormal muscle tone (5/5 normal muscle strength bilateral lower extremities. Strength and tone 5/5 in lower extremities (right quadriceps, left quadriceps, right tibialis anterior, left tibialis anterior)).      Coordination: Coordination normal.      Gait: Gait abnormal (Antalgic. Is able to do heel and toe walking bilaterally.).      Deep Tendon Reflexes:      Reflex Scores:       Patellar reflexes are 2+ on the right side and 2+ on the left side.       Achilles reflexes are 2+ on the right side and 2+ on the left side.     Comments: Negative straight leg raising test   Psychiatric:         Behavior: Behavior normal.        Result Review :   The following data was reviewed by: JOSE Mueller on 05/25/2022:  Common labs    Common Labsle 12/9/21 12/9/21 12/9/21    1010 1010 1010   Glucose  96    BUN  14    Creatinine  0.80    eGFR Non  Am  79    eGFR African Am  91    Sodium  140    Potassium  4.2    Chloride  103    Calcium  8.8    Total Protein  6.1    Albumin  4.1    Total Bilirubin  0.7    Alkaline Phosphatase  87    AST (SGOT)  17    ALT (SGPT)  16    WBC 4.1     Hemoglobin 13.6     Hematocrit 39.4     Platelets 212     Total Cholesterol   201 (A)   Triglycerides   59   HDL Cholesterol   59   LDL Cholesterol    131 (A)   (A) Abnormal value       Comments are available for some flowsheets but are not being displayed.                     Assessment and Plan    Diagnoses and all orders for this visit:    1. Essential hypertension (Primary)  Assessment & Plan:  Her BP is well-controlled on Lisinopril which she will continue along with a low sodium diet.    Orders:  -     TSH  -     Comprehensive Metabolic Panel    2. Mild intermittent asthma without complication  Assessment & Plan:  Sx stable with daily Asmanex, uses Albuterol inh very rarely        3. Chronic bilateral low back pain without sciatica  Assessment & Plan:  She c/o an exacerbation of sx over past few months without  neurological sx, will treat with Medrol and continue to monitor. She has declined PT and additional imaging in the past but will readdress if sx continue to recur.    Orders:  -     methylPREDNISolone (Medrol) 4 MG dose pack; follow package directions  Dispense: 21 tablet; Refill: 0  -     gabapentin (NEURONTIN) 300 MG capsule; Take 1 capsule by mouth Every Night.  Dispense: 30 capsule; Refill: 0    4. Non-seasonal allergic rhinitis, unspecified trigger  Assessment & Plan:  She c/o increased drainage, managed on Singulair.      5. Moderate episode of recurrent major depressive disorder (HCC)  Assessment & Plan:  Sx managed on Venlafaxine which she is tolerating well           Follow Up   Return in about 6 months (around 11/25/2022) for Annual physical.  Patient was given instructions and counseling regarding her condition or for health maintenance advice. Please see specific information pulled into the AVS if appropriate.

## 2022-05-30 NOTE — ASSESSMENT & PLAN NOTE
She c/o an exacerbation of sx over past few months without neurological sx, will treat with Medrol and continue to monitor. She has declined PT and additional imaging in the past but will readdress if sx continue to recur.

## 2022-06-10 ENCOUNTER — TELEPHONE (OUTPATIENT)
Dept: INTERNAL MEDICINE | Facility: CLINIC | Age: 62
End: 2022-06-10

## 2022-06-10 NOTE — TELEPHONE ENCOUNTER
Pain started on last Friday  clinda started on wed by Dentist  She is still taking it.-   Does not know sig, qty or dose but states that it is a full bottle and she is not home to give me details    Please advise- patent was expecting to feel better by now.     Thank you

## 2022-06-10 NOTE — TELEPHONE ENCOUNTER
Caller: Paula Trevino    Relationship: Self    Best call back number: [phone]       What is the best time to reach you: AYYTIME     Who are you requesting to speak with (clinical staff, provider,  specific staff member): CLINICAL STAFF    What was the call regarding: PATIENT STATES SHE WENT TO THE DENTIST FOR TEETH, JAW AND FACE PAIN. THE DENTIST TOLD HER IT WAS A SINUS INFECTION AND PRESCRIBED HER CLINDAMYCIN.     PATIENT STATES THIS MEDICATION IS NOT HELPING LIKE IT SHOULD AND IS WANTING TO KNOW IF THERE IS SOMETHING ELSE SHE CAN TAKE OR IF SHE NEEDS TO MAKE AN APPT WITH HER PCP    PLEASE CALL AND ADVISE     Do you require a callback: YES

## 2022-06-13 ENCOUNTER — OFFICE VISIT (OUTPATIENT)
Dept: INTERNAL MEDICINE | Facility: CLINIC | Age: 62
End: 2022-06-13

## 2022-06-13 VITALS
SYSTOLIC BLOOD PRESSURE: 122 MMHG | DIASTOLIC BLOOD PRESSURE: 81 MMHG | HEART RATE: 91 BPM | TEMPERATURE: 98.2 F | WEIGHT: 163 LBS | BODY MASS INDEX: 32.86 KG/M2 | HEIGHT: 59 IN | OXYGEN SATURATION: 97 %

## 2022-06-13 DIAGNOSIS — R68.84 JAW PAIN: Primary | ICD-10-CM

## 2022-06-13 PROCEDURE — 99213 OFFICE O/P EST LOW 20 MIN: CPT | Performed by: NURSE PRACTITIONER

## 2022-06-13 RX ORDER — CLINDAMYCIN HYDROCHLORIDE 300 MG/1
CAPSULE ORAL
COMMUNITY
Start: 2022-06-08 | End: 2022-07-15

## 2022-07-14 DIAGNOSIS — F33.1 MODERATE EPISODE OF RECURRENT MAJOR DEPRESSIVE DISORDER: ICD-10-CM

## 2022-07-14 DIAGNOSIS — S39.012A ACUTE MYOFASCIAL STRAIN OF LUMBAR REGION, INITIAL ENCOUNTER: ICD-10-CM

## 2022-07-14 RX ORDER — METHOCARBAMOL 500 MG/1
TABLET, FILM COATED ORAL
Qty: 30 TABLET | Refills: 0 | Status: SHIPPED | OUTPATIENT
Start: 2022-07-14 | End: 2022-09-09 | Stop reason: SDUPTHER

## 2022-07-14 RX ORDER — MONTELUKAST SODIUM 10 MG/1
TABLET ORAL
Qty: 90 TABLET | Refills: 0 | Status: SHIPPED | OUTPATIENT
Start: 2022-07-14 | End: 2022-12-30 | Stop reason: SDUPTHER

## 2022-07-15 ENCOUNTER — OFFICE VISIT (OUTPATIENT)
Dept: INTERNAL MEDICINE | Facility: CLINIC | Age: 62
End: 2022-07-15

## 2022-07-15 VITALS
DIASTOLIC BLOOD PRESSURE: 88 MMHG | TEMPERATURE: 98.9 F | WEIGHT: 163.2 LBS | BODY MASS INDEX: 32.9 KG/M2 | OXYGEN SATURATION: 97 % | HEIGHT: 59 IN | SYSTOLIC BLOOD PRESSURE: 128 MMHG | HEART RATE: 82 BPM

## 2022-07-15 DIAGNOSIS — R10.32 LEFT LOWER QUADRANT PAIN: Primary | ICD-10-CM

## 2022-07-15 PROCEDURE — 99214 OFFICE O/P EST MOD 30 MIN: CPT | Performed by: NURSE PRACTITIONER

## 2022-07-15 RX ORDER — CIPROFLOXACIN 500 MG/1
500 TABLET, FILM COATED ORAL EVERY 12 HOURS SCHEDULED
Qty: 10 TABLET | Refills: 0 | Status: SHIPPED | OUTPATIENT
Start: 2022-07-15 | End: 2022-07-20

## 2022-07-15 RX ORDER — ONDANSETRON 4 MG/1
4 TABLET, FILM COATED ORAL EVERY 8 HOURS PRN
Qty: 15 TABLET | Refills: 0 | Status: SHIPPED | OUTPATIENT
Start: 2022-07-15 | End: 2022-07-18

## 2022-07-15 RX ORDER — METRONIDAZOLE 500 MG/1
500 TABLET ORAL 3 TIMES DAILY
Qty: 21 TABLET | Refills: 0 | Status: SHIPPED | OUTPATIENT
Start: 2022-07-15 | End: 2022-07-22

## 2022-07-15 RX ORDER — HYDROCODONE BITARTRATE AND ACETAMINOPHEN 5; 325 MG/1; MG/1
TABLET ORAL
COMMUNITY
Start: 2022-06-20 | End: 2022-07-27

## 2022-07-15 NOTE — PROGRESS NOTES
"Chief Complaint  Abdominal Pain    Subjective        Paula Trevino presents to [facility] due to left lower quadrant pain.    She c/o lower abdominal pain for the past several days with associated nausea.  No vomiting.  She does note increased bowel movements but denies diarrhea.  No rectal bleeding.  She has had decreased appetite.  She also complains of left knee and hip pain without acute injury.  She was recently treated for jaw pain which she states has improved, noted to have abscess by dentist.    Objective   Vital Signs:  /88 (BP Location: Left arm, Patient Position: Sitting, Cuff Size: Adult)   Pulse 82   Temp 98.9 °F (37.2 °C) (Temporal)   Ht 149.9 cm (59\")   Wt 74 kg (163 lb 3.2 oz)   SpO2 97%   BMI 32.96 kg/m²   Estimated body mass index is 32.96 kg/m² as calculated from the following:    Height as of this encounter: 149.9 cm (59\").    Weight as of this encounter: 74 kg (163 lb 3.2 oz).          Physical Exam  Constitutional:       Appearance: She is well-developed. She is not ill-appearing.   HENT:      Head: Normocephalic.      Right Ear: Hearing, tympanic membrane and external ear normal.      Left Ear: Hearing, tympanic membrane and external ear normal.      Nose: Nose normal. No nasal deformity, mucosal edema or rhinorrhea.      Right Sinus: No maxillary sinus tenderness or frontal sinus tenderness.      Left Sinus: No maxillary sinus tenderness or frontal sinus tenderness.      Mouth/Throat:      Dentition: Normal dentition.   Eyes:      General: Lids are normal.         Right eye: No discharge.         Left eye: No discharge.      Conjunctiva/sclera: Conjunctivae normal.      Right eye: No exudate.     Left eye: No exudate.  Neck:      Thyroid: No thyroid mass or thyromegaly.      Vascular: No carotid bruit.      Trachea: Trachea normal.   Cardiovascular:      Rate and Rhythm: Regular rhythm.      Pulses: Normal pulses.      Heart sounds: Normal heart " sounds. No murmur heard.  Pulmonary:      Effort: No respiratory distress.      Breath sounds: Normal breath sounds. No decreased breath sounds, wheezing, rhonchi or rales.   Abdominal:      General: Bowel sounds are normal.      Palpations: Abdomen is soft.      Tenderness: There is abdominal tenderness in the left lower quadrant. There is no guarding or rebound.   Musculoskeletal:      Cervical back: Normal range of motion. No edema.   Lymphadenopathy:      Head:      Right side of head: No submental, submandibular, tonsillar, preauricular, posterior auricular or occipital adenopathy.      Left side of head: No submental, submandibular, tonsillar, preauricular, posterior auricular or occipital adenopathy.   Skin:     General: Skin is warm and dry.      Nails: There is no clubbing.   Neurological:      Mental Status: She is alert.   Psychiatric:         Behavior: Behavior is cooperative.        Result Review :  The following data was reviewed by: JOSE Mueller on 07/15/2022:  Common labs    Common Labsle 12/9/21 12/9/21 12/9/21 7/15/22    1010 1010 1010    Glucose  96     BUN  14     Creatinine  0.80     eGFR Non  Am  79     eGFR African Am  91     Sodium  140     Potassium  4.2     Chloride  103     Calcium  8.8     Total Protein  6.1     Albumin  4.1     Total Bilirubin  0.7     Alkaline Phosphatase  87     AST (SGOT)  17     ALT (SGPT)  16     WBC 4.1   6.6   Hemoglobin 13.6   14.9   Hematocrit 39.4   44.8   Platelets 212   255   Total Cholesterol   201 (A)    Triglycerides   59    HDL Cholesterol   59    LDL Cholesterol    131 (A)    (A) Abnormal value       Comments are available for some flowsheets but are not being displayed.                     Assessment and Plan   Diagnoses and all orders for this visit:    1. Left lower quadrant pain (Primary)  -     ciprofloxacin (Cipro) 500 MG tablet; Take 1 tablet by mouth Every 12 (Twelve) Hours for 5 days.  Dispense: 10 tablet; Refill: 0  -      metroNIDAZOLE (FLAGYL) 500 MG tablet; Take 1 tablet by mouth 3 (Three) Times a Day for 7 days.  Dispense: 21 tablet; Refill: 0  -     CBC & Differential  -     Lipase  -     Amylase  -     Urinalysis With Microscopic If Indicated (No Culture) - Urine, Clean Catch  -     ondansetron (ZOFRAN) 4 MG tablet; Take 1 tablet by mouth Every 8 (Eight) Hours As Needed for Nausea or Vomiting for up to 3 days.  Dispense: 15 tablet; Refill: 0    Her symptoms are suggestive of diverticulitis, will check labs to further evaluate and treat for presumed diverticulitis with Cipro and Flagyl (patient is allergic to penicillin).  She will follow-up in 2 weeks for reevaluation or report to the ER sooner with worsening symptoms.  Will obtain CT scan of the abdomen and pelvis if symptoms have not resolved.       Follow Up   Return in about 1 week (around 7/22/2022).  Patient was given instructions and counseling regarding her condition or for health maintenance advice. Please see specific information pulled into the AVS if appropriate.

## 2022-07-16 LAB
AMYLASE SERPL-CCNC: 64 U/L (ref 31–110)
APPEARANCE UR: CLEAR
BASOPHILS # BLD AUTO: 0 X10E3/UL (ref 0–0.2)
BASOPHILS NFR BLD AUTO: 0 %
BILIRUB UR QL STRIP: NEGATIVE
COLOR UR: YELLOW
EOSINOPHIL # BLD AUTO: 0 X10E3/UL (ref 0–0.4)
EOSINOPHIL NFR BLD AUTO: 0 %
ERYTHROCYTE [DISTWIDTH] IN BLOOD BY AUTOMATED COUNT: 13.3 % (ref 11.7–15.4)
GLUCOSE UR QL STRIP: NEGATIVE
HCT VFR BLD AUTO: 44.8 % (ref 34–46.6)
HGB BLD-MCNC: 14.9 G/DL (ref 11.1–15.9)
HGB UR QL STRIP: NEGATIVE
IMM GRANULOCYTES # BLD AUTO: 0 X10E3/UL (ref 0–0.1)
IMM GRANULOCYTES NFR BLD AUTO: 1 %
KETONES UR QL STRIP: NEGATIVE
LEUKOCYTE ESTERASE UR QL STRIP: NEGATIVE
LIPASE SERPL-CCNC: 56 U/L (ref 14–72)
LYMPHOCYTES # BLD AUTO: 1.3 X10E3/UL (ref 0.7–3.1)
LYMPHOCYTES NFR BLD AUTO: 20 %
MCH RBC QN AUTO: 28.4 PG (ref 26.6–33)
MCHC RBC AUTO-ENTMCNC: 33.3 G/DL (ref 31.5–35.7)
MCV RBC AUTO: 86 FL (ref 79–97)
MICRO URNS: NORMAL
MONOCYTES # BLD AUTO: 0.5 X10E3/UL (ref 0.1–0.9)
MONOCYTES NFR BLD AUTO: 7 %
NEUTROPHILS # BLD AUTO: 4.8 X10E3/UL (ref 1.4–7)
NEUTROPHILS NFR BLD AUTO: 72 %
NITRITE UR QL STRIP: NEGATIVE
PH UR STRIP: 5 [PH] (ref 5–7.5)
PLATELET # BLD AUTO: 255 X10E3/UL (ref 150–450)
PROT UR QL STRIP: NEGATIVE
RBC # BLD AUTO: 5.24 X10E6/UL (ref 3.77–5.28)
SP GR UR STRIP: 1.02 (ref 1–1.03)
UROBILINOGEN UR STRIP-MCNC: 0.2 MG/DL (ref 0.2–1)
WBC # BLD AUTO: 6.6 X10E3/UL (ref 3.4–10.8)

## 2022-07-25 ENCOUNTER — OFFICE VISIT (OUTPATIENT)
Dept: INTERNAL MEDICINE | Facility: CLINIC | Age: 62
End: 2022-07-25

## 2022-07-25 ENCOUNTER — HOSPITAL ENCOUNTER (OUTPATIENT)
Dept: GENERAL RADIOLOGY | Facility: HOSPITAL | Age: 62
Discharge: HOME | End: 2022-07-25

## 2022-07-25 ENCOUNTER — HOSPITAL ENCOUNTER (OUTPATIENT)
Dept: CT IMAGING | Facility: HOSPITAL | Age: 62
Discharge: HOME | End: 2022-07-25

## 2022-07-25 VITALS
WEIGHT: 163 LBS | HEIGHT: 59 IN | SYSTOLIC BLOOD PRESSURE: 142 MMHG | OXYGEN SATURATION: 97 % | DIASTOLIC BLOOD PRESSURE: 92 MMHG | BODY MASS INDEX: 32.86 KG/M2 | TEMPERATURE: 98.7 F | HEART RATE: 70 BPM

## 2022-07-25 DIAGNOSIS — M25.552 LEFT HIP PAIN: ICD-10-CM

## 2022-07-25 DIAGNOSIS — R10.32 LEFT LOWER QUADRANT PAIN: Primary | ICD-10-CM

## 2022-07-25 DIAGNOSIS — M54.16 LUMBAR RADICULOPATHY: ICD-10-CM

## 2022-07-25 DIAGNOSIS — R10.32 LEFT LOWER QUADRANT PAIN: ICD-10-CM

## 2022-07-25 DIAGNOSIS — M25.562 ACUTE PAIN OF LEFT KNEE: ICD-10-CM

## 2022-07-25 LAB — CREAT BLDA-MCNC: 0.9 MG/DL (ref 0.6–1.3)

## 2022-07-25 PROCEDURE — 0 DIATRIZOATE MEGLUMINE & SODIUM PER 1 ML: Performed by: NURSE PRACTITIONER

## 2022-07-25 PROCEDURE — 73560 X-RAY EXAM OF KNEE 1 OR 2: CPT

## 2022-07-25 PROCEDURE — 99213 OFFICE O/P EST LOW 20 MIN: CPT | Performed by: NURSE PRACTITIONER

## 2022-07-25 PROCEDURE — 73502 X-RAY EXAM HIP UNI 2-3 VIEWS: CPT

## 2022-07-25 PROCEDURE — 72110 X-RAY EXAM L-2 SPINE 4/>VWS: CPT

## 2022-07-25 PROCEDURE — 74177 CT ABD & PELVIS W/CONTRAST: CPT

## 2022-07-25 PROCEDURE — 25010000002 IOPAMIDOL 61 % SOLUTION: Performed by: NURSE PRACTITIONER

## 2022-07-25 PROCEDURE — 82565 ASSAY OF CREATININE: CPT

## 2022-07-25 RX ADMIN — DIATRIZOATE MEGLUMINE AND DIATRIZOATE SODIUM 30 ML: 660; 100 LIQUID ORAL; RECTAL at 13:45

## 2022-07-25 RX ADMIN — IOPAMIDOL 100 ML: 612 INJECTION, SOLUTION INTRAVENOUS at 14:51

## 2022-07-25 NOTE — PROGRESS NOTES
"Chief Complaint  Abdominal Pain (1 week follow up)    Subjective        Paula Trevino presents to [facility] for f/u regarding left lower quadrant pain.    She c/o nausea since her last visit with intermittent LLQ pain. She states pain was severe yesterday but has improved today. Denies fever. No vomiting but does note poor oral intake.  She also c/o worsening low back pain radiating into the left leg which has been recurrent, has attended PT in the past with moderate improvement.      Objective   Vital Signs:  /92 (BP Location: Left arm, Patient Position: Sitting, Cuff Size: Adult)   Pulse 70   Temp 98.7 °F (37.1 °C) (Temporal)   Ht 149.9 cm (59\")   Wt 73.9 kg (163 lb)   SpO2 97%   BMI 32.92 kg/m²   Estimated body mass index is 32.92 kg/m² as calculated from the following:    Height as of this encounter: 149.9 cm (59\").    Weight as of this encounter: 73.9 kg (163 lb).          Physical Exam  Constitutional:       Appearance: She is well-developed. She is not ill-appearing.   HENT:      Head: Normocephalic.      Right Ear: Hearing, tympanic membrane and external ear normal.      Left Ear: Hearing, tympanic membrane and external ear normal.      Nose: Nose normal. No nasal deformity, mucosal edema or rhinorrhea.      Right Sinus: No maxillary sinus tenderness or frontal sinus tenderness.      Left Sinus: No maxillary sinus tenderness or frontal sinus tenderness.      Mouth/Throat:      Dentition: Normal dentition.   Eyes:      General: Lids are normal.         Right eye: No discharge.         Left eye: No discharge.      Conjunctiva/sclera: Conjunctivae normal.      Right eye: No exudate.     Left eye: No exudate.  Neck:      Thyroid: No thyroid mass or thyromegaly.      Vascular: No carotid bruit.      Trachea: Trachea normal.   Cardiovascular:      Rate and Rhythm: Regular rhythm.      Pulses: Normal pulses.      Heart sounds: Normal heart sounds. No murmur " heard.  Pulmonary:      Effort: No respiratory distress.      Breath sounds: Normal breath sounds. No decreased breath sounds, wheezing, rhonchi or rales.   Abdominal:      General: Bowel sounds are normal.      Palpations: Abdomen is soft.      Tenderness: There is abdominal tenderness in the left lower quadrant. There is no guarding or rebound.   Musculoskeletal:      Cervical back: Normal range of motion. No edema.      Lumbar back: Tenderness present.   Lymphadenopathy:      Head:      Right side of head: No submental, submandibular, tonsillar, preauricular, posterior auricular or occipital adenopathy.      Left side of head: No submental, submandibular, tonsillar, preauricular, posterior auricular or occipital adenopathy.   Skin:     General: Skin is warm and dry.      Nails: There is no clubbing.   Neurological:      Mental Status: She is alert.   Psychiatric:         Behavior: Behavior is cooperative.        Result Review :  The following data was reviewed by: JOSE Mueller on 07/25/2022:  Common labs    Common Labsle 12/9/21 12/9/21 12/9/21 7/15/22 7/25/22    1010 1010 1010     Glucose  96      BUN  14      Creatinine  0.80   0.90   eGFR Non  Am  79      eGFR African Am  91      Sodium  140      Potassium  4.2      Chloride  103      Calcium  8.8      Total Protein  6.1      Albumin  4.1      Total Bilirubin  0.7      Alkaline Phosphatase  87      AST (SGOT)  17      ALT (SGPT)  16      WBC 4.1   6.6    Hemoglobin 13.6   14.9    Hematocrit 39.4   44.8    Platelets 212   255    Total Cholesterol   201 (A)     Triglycerides   59     HDL Cholesterol   59     LDL Cholesterol    131 (A)     (A) Abnormal value       Comments are available for some flowsheets but are not being displayed.           Data reviewed: Radiologic studies CT abd/pelvis          Assessment and Plan   Diagnoses and all orders for this visit:    1. Left lower quadrant pain (Primary)  -     CT Abdomen Pelvis With Contrast;  Future    2. Lumbar radiculopathy  -     XR Spine Lumbar Complete 4+VW    3. Left hip pain  -     XR Hip With or Without Pelvis 2 - 3 View Left    4. Acute pain of left knee  -     XR Knee AP & Lateral    Due to persistence of abdominal pain she was sent for a CT abd/pelvis which did not show any acute abnl. She may d/c antibiotics and take Zofran as needed for nausea (side effect of meds?). Ida diet and f/u if sx persist or worsen.    She c/o worsening back and leg pain, will obtain xrays. May need MRI to further evaluate radicular sx.         Follow Up   No follow-ups on file.  Patient was given instructions and counseling regarding her condition or for health maintenance advice. Please see specific information pulled into the AVS if appropriate.

## 2022-07-26 ENCOUNTER — TELEPHONE (OUTPATIENT)
Dept: INTERNAL MEDICINE | Facility: CLINIC | Age: 62
End: 2022-07-26

## 2022-07-26 NOTE — TELEPHONE ENCOUNTER
Paula Uriel called requesting results of her x-ray and CT from yesterday afternoon.  They had told her the results should be ready.

## 2022-07-27 ENCOUNTER — OFFICE VISIT (OUTPATIENT)
Dept: INTERNAL MEDICINE | Facility: CLINIC | Age: 62
End: 2022-07-27

## 2022-07-27 VITALS
DIASTOLIC BLOOD PRESSURE: 75 MMHG | OXYGEN SATURATION: 97 % | RESPIRATION RATE: 18 BRPM | HEIGHT: 59 IN | SYSTOLIC BLOOD PRESSURE: 115 MMHG | HEART RATE: 59 BPM | TEMPERATURE: 96.2 F | WEIGHT: 163 LBS | BODY MASS INDEX: 32.86 KG/M2

## 2022-07-27 DIAGNOSIS — R19.7 DIARRHEA, UNSPECIFIED TYPE: Primary | ICD-10-CM

## 2022-07-27 DIAGNOSIS — R11.10 VOMITING, UNSPECIFIED VOMITING TYPE, UNSPECIFIED WHETHER NAUSEA PRESENT: ICD-10-CM

## 2022-07-27 DIAGNOSIS — M54.16 LUMBAR RADICULOPATHY: ICD-10-CM

## 2022-07-27 PROCEDURE — 99213 OFFICE O/P EST LOW 20 MIN: CPT

## 2022-07-27 RX ORDER — METOCLOPRAMIDE 5 MG/1
5 TABLET ORAL
Qty: 18 TABLET | Refills: 0 | Status: SHIPPED | OUTPATIENT
Start: 2022-07-27 | End: 2022-09-09

## 2022-07-27 RX ORDER — ONDANSETRON 4 MG/1
4 TABLET, FILM COATED ORAL EVERY 8 HOURS PRN
COMMUNITY
End: 2022-09-09

## 2022-07-27 NOTE — PROGRESS NOTES
"Chief Complaint  Diarrhea and Vomiting (Pt presents to us with diarrhea and vomiting that started this morning )    Subjective        Paula Trevino presents to [facility]  61 y.o. female presenting with concerns of nausea and vomiting and left hip pain. Pt of Nitza Mena APRN. Treated for colitis on 7/15 with Cipro and Flagyl. Diarrhea has improved but nausea continues especially after eating anything. Not eating much; crackers, chicken noodle soup; cereal last night ([employer] bean and cheese burrito - yesterday). Drinks Coke and water. \"Zofran doesn't seem to help\". Continued left hip pain with some improvement when on gabapentin. Stopped gabapentin due to nausea with antibiotics. Denies fever, chest pain, difficulty breathing, bloody or coffee-ground stools.      Objective   Vital Signs:  /75 (BP Location: Right arm, Patient Position: Sitting, Cuff Size: Large Adult)   Pulse 59   Temp 96.2 °F (35.7 °C)   Resp 18   Ht 149.9 cm (59\")   Wt 73.9 kg (163 lb)   SpO2 97%   BMI 32.92 kg/m²   Estimated body mass index is 32.92 kg/m² as calculated from the following:    Height as of this encounter: 149.9 cm (59\").    Weight as of this encounter: 73.9 kg (163 lb).          Physical Exam  Vitals and nursing note reviewed.   Constitutional:       Appearance: Normal appearance.   HENT:      Head: Normocephalic.   Cardiovascular:      Rate and Rhythm: Normal rate.      Pulses: Normal pulses.      Heart sounds: Normal heart sounds.   Pulmonary:      Effort: Pulmonary effort is normal.      Breath sounds: Normal breath sounds.   Abdominal:      General: Bowel sounds are normal.      Palpations: Abdomen is soft.   Musculoskeletal:      Lumbar back: Scoliosis present.   Skin:     General: Skin is warm and dry.      Capillary Refill: Capillary refill takes less than 2 seconds.   Neurological:      General: No focal deficit present.      Mental Status: She is alert and oriented to " person, place, and time.   Psychiatric:         Mood and Affect: Mood normal.         Behavior: Behavior normal.         Thought Content: Thought content normal.         Judgment: Judgment normal.        Result Review :  The following data was reviewed by: JOSE Cox on 07/27/2022:  Common labs    Common Labsle 12/9/21 12/9/21 12/9/21 7/15/22 7/25/22    1010 1010 1010     Glucose  96      BUN  14      Creatinine  0.80   0.90   eGFR Non  Am  79      eGFR African Am  91      Sodium  140      Potassium  4.2      Chloride  103      Calcium  8.8      Total Protein  6.1      Albumin  4.1      Total Bilirubin  0.7      Alkaline Phosphatase  87      AST (SGOT)  17      ALT (SGPT)  16      WBC 4.1   6.6    Hemoglobin 13.6   14.9    Hematocrit 39.4   44.8    Platelets 212   255    Total Cholesterol   201 (A)     Triglycerides   59     HDL Cholesterol   59     LDL Cholesterol    131 (A)     (A) Abnormal value       Comments are available for some flowsheets but are not being displayed.           Current Outpatient Medications on File Prior to Visit   Medication Sig Dispense Refill   • albuterol sulfate  (90 Base) MCG/ACT inhaler INHALE 2 PUFFS BY MOUTH EVERY HOUR AS NEEDED FOR SHORTNESS OF BREATH     • gabapentin (NEURONTIN) 300 MG capsule Take 1 capsule by mouth Every Night. 30 capsule 0   • lisinopril (PRINIVIL,ZESTRIL) 10 MG tablet Take 1 tablet by mouth once daily 90 tablet 3   • methocarbamol (ROBAXIN) 500 MG tablet Take 1 tablet by mouth 4 times daily 30 tablet 0   • Mometasone Furoate (Asmanex HFA) 200 MCG/ACT aerosol Inhale 1 puff Daily. 1 inhaler 11   • montelukast (SINGULAIR) 10 MG tablet TAKE 1 TABLET BY MOUTH ONCE DAILY AT BEDTIME 90 tablet 0   • ondansetron (ZOFRAN) 4 MG tablet Take 4 mg by mouth Every 8 (Eight) Hours As Needed for Nausea or Vomiting.     • venlafaxine (EFFEXOR) 37.5 MG tablet Take 1 tablet by mouth twice daily 180 tablet 0   • [DISCONTINUED] HYDROcodone-acetaminophen (NORCO)  5-325 MG per tablet        No current facility-administered medications on file prior to visit.       Data reviewed: Radiologic studies CT and X-rays from 7/25/22.          Assessment and Plan   Diagnoses and all orders for this visit:    1. Diarrhea, unspecified type (Primary)    2. Vomiting, unspecified vomiting type, unspecified whether nausea present  -     metoclopramide (Reglan) 5 MG tablet; Take 1 tablet by mouth 3 (Three) Times a Day Before Meals.  Dispense: 18 tablet; Refill: 0    3. Lumbar radiculopathy  -     MRI Lumbar Spine With & Without Contrast; Future    Reglan 3 times a day before meals. Hold gabapentin while on Reglan. Monitor for signs of high blood pressure, tremors and coordination issues. Continue bland diet. MRI ordered for lumbar spine. Follow-up in 2 weeks.    Discussed with the patient and all questioned fully answered. She will call me if any problems arise.           Follow Up   Return in about 2 weeks (around 8/10/2022) for with Nitza.  Patient was given instructions and counseling regarding her condition or for health maintenance advice. Please see specific information pulled into the AVS if appropriate.

## 2022-07-27 NOTE — PATIENT INSTRUCTIONS
Reglan 3 times a day before meals. Hold gabapentin while on Reglan. Monitor for signs of high blood pressure, tremors and coordination issues. Continue bland diet. MRI ordered for lumbar spine. Follow-up in 2 weeks.

## 2022-08-02 ENCOUNTER — OFFICE VISIT (OUTPATIENT)
Dept: INTERNAL MEDICINE | Facility: CLINIC | Age: 62
End: 2022-08-02

## 2022-08-02 VITALS
SYSTOLIC BLOOD PRESSURE: 138 MMHG | OXYGEN SATURATION: 97 % | HEIGHT: 59 IN | TEMPERATURE: 98.6 F | DIASTOLIC BLOOD PRESSURE: 90 MMHG | WEIGHT: 163.8 LBS | BODY MASS INDEX: 33.02 KG/M2 | HEART RATE: 67 BPM

## 2022-08-02 DIAGNOSIS — R30.0 DYSURIA: ICD-10-CM

## 2022-08-02 DIAGNOSIS — M54.50 CHRONIC BILATERAL LOW BACK PAIN WITHOUT SCIATICA: Chronic | ICD-10-CM

## 2022-08-02 DIAGNOSIS — G89.29 CHRONIC BILATERAL LOW BACK PAIN WITHOUT SCIATICA: Chronic | ICD-10-CM

## 2022-08-02 DIAGNOSIS — N30.00 ACUTE CYSTITIS WITHOUT HEMATURIA: Primary | ICD-10-CM

## 2022-08-02 LAB
BACTERIA UR QL AUTO: ABNORMAL /HPF
BILIRUB UR QL STRIP: NEGATIVE
CLARITY UR: ABNORMAL
COLOR UR: YELLOW
GLUCOSE UR STRIP-MCNC: NEGATIVE MG/DL
HGB UR QL STRIP.AUTO: ABNORMAL
HYALINE CASTS UR QL AUTO: ABNORMAL /LPF
KETONES UR QL STRIP: NEGATIVE
LEUKOCYTE ESTERASE UR QL STRIP.AUTO: ABNORMAL
MUCOUS THREADS URNS QL MICRO: ABNORMAL /HPF
NITRITE UR QL STRIP: NEGATIVE
PH UR STRIP.AUTO: 5.5 [PH] (ref 5–8)
PROT UR QL STRIP: NEGATIVE
RBC # UR STRIP: ABNORMAL /HPF
REF LAB TEST METHOD: ABNORMAL
SP GR UR STRIP: 1.02 (ref 1–1.03)
SQUAMOUS #/AREA URNS HPF: ABNORMAL /HPF
UROBILINOGEN UR QL STRIP: ABNORMAL
WBC # UR STRIP: ABNORMAL /HPF
WBC CLUMPS # UR AUTO: ABNORMAL /HPF

## 2022-08-02 PROCEDURE — 99213 OFFICE O/P EST LOW 20 MIN: CPT | Performed by: NURSE PRACTITIONER

## 2022-08-02 PROCEDURE — 81001 URINALYSIS AUTO W/SCOPE: CPT | Performed by: NURSE PRACTITIONER

## 2022-08-02 RX ORDER — NITROFURANTOIN 25; 75 MG/1; MG/1
100 CAPSULE ORAL EVERY 12 HOURS SCHEDULED
Qty: 14 CAPSULE | Refills: 0 | Status: SHIPPED | OUTPATIENT
Start: 2022-08-02 | End: 2022-08-09

## 2022-08-02 RX ORDER — PHENAZOPYRIDINE HYDROCHLORIDE 200 MG/1
200 TABLET, FILM COATED ORAL 3 TIMES DAILY PRN
Qty: 6 TABLET | Refills: 0 | Status: SHIPPED | OUTPATIENT
Start: 2022-08-02 | End: 2022-08-04

## 2022-08-02 NOTE — PROGRESS NOTES
"Chief Complaint  Urinary Frequency (And burning)    Subjective        Paula Trevino presents to [facility] due to urinary frequency with dysuria.    She c/o dysuria with urinary frequency over the past several days. She was seen last week due to c/o nausea and vomiting with loose stools. Sx have since resolved, denies abdominal pain.  She continues to c/o low back pain radiating into the left leg, xray showed DDD. MRI of the lumbar spine was ordered to further evaluate.        Objective   Vital Signs:  /90 (BP Location: Left arm, Patient Position: Sitting, Cuff Size: Adult)   Pulse 67   Temp 98.6 °F (37 °C) (Temporal)   Ht 149.9 cm (59\")   Wt 74.3 kg (163 lb 12.8 oz)   SpO2 97%   BMI 33.08 kg/m²   Estimated body mass index is 33.08 kg/m² as calculated from the following:    Height as of this encounter: 149.9 cm (59\").    Weight as of this encounter: 74.3 kg (163 lb 12.8 oz).          Physical Exam  Constitutional:       Appearance: She is well-developed. She is not ill-appearing.   HENT:      Head: Normocephalic.      Right Ear: Hearing, tympanic membrane and external ear normal.      Left Ear: Hearing, tympanic membrane and external ear normal.      Nose: Nose normal. No nasal deformity, mucosal edema or rhinorrhea.      Right Sinus: No maxillary sinus tenderness or frontal sinus tenderness.      Left Sinus: No maxillary sinus tenderness or frontal sinus tenderness.      Mouth/Throat:      Dentition: Normal dentition.   Eyes:      General: Lids are normal.         Right eye: No discharge.         Left eye: No discharge.      Conjunctiva/sclera: Conjunctivae normal.      Right eye: No exudate.     Left eye: No exudate.  Neck:      Thyroid: No thyroid mass or thyromegaly.      Vascular: No carotid bruit.      Trachea: Trachea normal.   Cardiovascular:      Rate and Rhythm: Regular rhythm.      Pulses: Normal pulses.      Heart sounds: Normal heart sounds. No murmur " heard.  Pulmonary:      Effort: No respiratory distress.      Breath sounds: Normal breath sounds. No decreased breath sounds, wheezing, rhonchi or rales.   Abdominal:      General: Bowel sounds are normal.      Palpations: Abdomen is soft.      Tenderness: There is no abdominal tenderness.   Musculoskeletal:      Cervical back: Normal range of motion. No edema.      Lumbar back: Tenderness present.   Lymphadenopathy:      Head:      Right side of head: No submental, submandibular, tonsillar, preauricular, posterior auricular or occipital adenopathy.      Left side of head: No submental, submandibular, tonsillar, preauricular, posterior auricular or occipital adenopathy.   Skin:     General: Skin is warm and dry.      Nails: There is no clubbing.   Neurological:      Mental Status: She is alert.   Psychiatric:         Behavior: Behavior is cooperative.        Result Review :  The following data was reviewed by: JOSE Mueller on 08/02/2022:  Common labs    Common Labsle 12/9/21 12/9/21 12/9/21 7/15/22 7/25/22    1010 1010 1010     Glucose  96      BUN  14      Creatinine  0.80   0.90   eGFR Non  Am  79      eGFR African Am  91      Sodium  140      Potassium  4.2      Chloride  103      Calcium  8.8      Total Protein  6.1      Albumin  4.1      Total Bilirubin  0.7      Alkaline Phosphatase  87      AST (SGOT)  17      ALT (SGPT)  16      WBC 4.1   6.6    Hemoglobin 13.6   14.9    Hematocrit 39.4   44.8    Platelets 212   255    Total Cholesterol   201 (A)     Triglycerides   59     HDL Cholesterol   59     LDL Cholesterol    131 (A)     (A) Abnormal value       Comments are available for some flowsheets but are not being displayed.                     Assessment and Plan   Diagnoses and all orders for this visit:    1. Acute cystitis without hematuria (Primary)  Assessment & Plan:  UA +WBCs, bacteria-will start antibiotic and follow results of urine culture. She is advised to increase clear  fluids.    Orders:  -     nitrofurantoin, macrocrystal-monohydrate, (MACROBID) 100 MG capsule; Take 1 capsule by mouth Every 12 (Twelve) Hours for 7 days.  Dispense: 14 capsule; Refill: 0  -     phenazopyridine (Pyridium) 200 MG tablet; Take 1 tablet by mouth 3 (Three) Times a Day As Needed for Bladder Spasms for up to 2 days.  Dispense: 6 tablet; Refill: 0    2. Dysuria  -     Urinalysis With Microscopic If Indicated (No Culture) - Urine, Clean Catch  -     Urinalysis, Microscopic Only - Urine, Clean Catch  -     Urine Culture - Urine, Urine, Clean Catch    3. Chronic bilateral low back pain without sciatica  Assessment & Plan:  She continues to c/o low back pain radiating into the left leg, will follow results of MRI of lumbar spine.           Follow Up   Return if symptoms worsen or fail to improve, for Next scheduled follow up.  Patient was given instructions and counseling regarding her condition or for health maintenance advice. Please see specific information pulled into the AVS if appropriate.

## 2022-08-03 LAB
BACTERIA UR CULT: NORMAL
BACTERIA UR CULT: NORMAL

## 2022-08-03 NOTE — ASSESSMENT & PLAN NOTE
UA +WBCs, bacteria-will start antibiotic and follow results of urine culture. She is advised to increase clear fluids.

## 2022-08-03 NOTE — ASSESSMENT & PLAN NOTE
She continues to c/o low back pain radiating into the left leg, will follow results of MRI of lumbar spine.

## 2022-08-18 ENCOUNTER — TELEPHONE (OUTPATIENT)
Dept: INTERNAL MEDICINE | Facility: CLINIC | Age: 62
End: 2022-08-18

## 2022-08-19 DIAGNOSIS — M54.50 CHRONIC BILATERAL LOW BACK PAIN WITHOUT SCIATICA: Primary | ICD-10-CM

## 2022-08-19 DIAGNOSIS — G89.29 CHRONIC BILATERAL LOW BACK PAIN WITHOUT SCIATICA: Primary | ICD-10-CM

## 2022-08-20 ENCOUNTER — APPOINTMENT (OUTPATIENT)
Dept: MRI IMAGING | Facility: HOSPITAL | Age: 62
End: 2022-08-20

## 2022-09-09 ENCOUNTER — OFFICE VISIT (OUTPATIENT)
Dept: INTERNAL MEDICINE | Facility: CLINIC | Age: 62
End: 2022-09-09

## 2022-09-09 VITALS
SYSTOLIC BLOOD PRESSURE: 130 MMHG | OXYGEN SATURATION: 97 % | WEIGHT: 157.2 LBS | HEART RATE: 83 BPM | HEIGHT: 59 IN | DIASTOLIC BLOOD PRESSURE: 84 MMHG | BODY MASS INDEX: 31.69 KG/M2 | TEMPERATURE: 97.5 F

## 2022-09-09 DIAGNOSIS — J06.9 VIRAL URI: Primary | ICD-10-CM

## 2022-09-09 DIAGNOSIS — M54.50 CHRONIC BILATERAL LOW BACK PAIN WITHOUT SCIATICA: ICD-10-CM

## 2022-09-09 DIAGNOSIS — R11.0 NAUSEA: ICD-10-CM

## 2022-09-09 DIAGNOSIS — G89.29 CHRONIC BILATERAL LOW BACK PAIN WITHOUT SCIATICA: ICD-10-CM

## 2022-09-09 DIAGNOSIS — Z20.822 SUSPECTED COVID-19 VIRUS INFECTION: ICD-10-CM

## 2022-09-09 PROBLEM — Z79.899 CONTROLLED SUBSTANCE AGREEMENT SIGNED: Status: ACTIVE | Noted: 2022-09-09

## 2022-09-09 LAB
EXPIRATION DATE: NORMAL
FLUAV AG UPPER RESP QL IA.RAPID: NOT DETECTED
FLUBV AG UPPER RESP QL IA.RAPID: NOT DETECTED
INTERNAL CONTROL: NORMAL
Lab: NORMAL
SARS-COV-2 RNA RESP QL NAA+PROBE: NOT DETECTED

## 2022-09-09 PROCEDURE — 87636 SARSCOV2 & INF A&B AMP PRB: CPT | Performed by: NURSE PRACTITIONER

## 2022-09-09 PROCEDURE — 99213 OFFICE O/P EST LOW 20 MIN: CPT | Performed by: NURSE PRACTITIONER

## 2022-09-09 RX ORDER — PROMETHAZINE HYDROCHLORIDE 25 MG/1
25 TABLET ORAL EVERY 6 HOURS PRN
Qty: 15 TABLET | Refills: 0 | Status: SHIPPED | OUTPATIENT
Start: 2022-09-09 | End: 2022-09-14

## 2022-09-09 RX ORDER — METHOCARBAMOL 500 MG/1
500 TABLET, FILM COATED ORAL 4 TIMES DAILY
Qty: 30 TABLET | Refills: 0 | Status: SHIPPED | OUTPATIENT
Start: 2022-09-09

## 2022-09-10 NOTE — PROGRESS NOTES
"Chief Complaint  Cough and Back Pain    Subjective        Paula Trevino presents to [facility] due to respiratory symptoms as well as low back pain.    She c/o a dull headache with increased nausea and drainage since Monday, has had negative COVID-19 test. She denies abdominal pain, c/o a dry cough without dyspnea. She does c/o worsening low back pain which has been recurrent over the past several months. Denies change in bowel or bladder function.      Objective   Vital Signs:  /84 (BP Location: Left arm, Patient Position: Sitting, Cuff Size: Adult)   Pulse 83   Temp 97.5 °F (36.4 °C) (Temporal)   Ht 149.9 cm (59\")   Wt 71.3 kg (157 lb 3.2 oz)   SpO2 97%   BMI 31.75 kg/m²   Estimated body mass index is 31.75 kg/m² as calculated from the following:    Height as of this encounter: 149.9 cm (59\").    Weight as of this encounter: 71.3 kg (157 lb 3.2 oz).          Physical Exam  HENT:      Head: Normocephalic.      Right Ear: External ear normal. Tympanic membrane is bulging.      Left Ear: External ear normal. Tympanic membrane is bulging.      Nose: Nose normal.      Mouth/Throat:      Pharynx: Posterior oropharyngeal erythema present.   Eyes:      General:         Right eye: No discharge.         Left eye: No discharge.      Conjunctiva/sclera: Conjunctivae normal.   Cardiovascular:      Pulses: Normal pulses.      Heart sounds: Normal heart sounds. No murmur heard.  Pulmonary:      Effort: No respiratory distress.      Breath sounds: Normal breath sounds. No wheezing, rhonchi or rales.   Musculoskeletal:      Cervical back: Normal range of motion.      Lumbar back: Tenderness present.   Lymphadenopathy:      Cervical: No cervical adenopathy.   Skin:     General: Skin is warm and dry.   Neurological:      Mental Status: She is alert.        Result Review :  The following data was reviewed by: JOSE Mueller on 09/09/2022:  Common labs    Common Labsle 12/9/21 12/9/21 " 12/9/21 7/15/22 7/25/22    1010 1010 1010     Glucose  96      BUN  14      Creatinine  0.80   0.90   eGFR Non  Am  79      eGFR African Am  91      Sodium  140      Potassium  4.2      Chloride  103      Calcium  8.8      Total Protein  6.1      Albumin  4.1      Total Bilirubin  0.7      Alkaline Phosphatase  87      AST (SGOT)  17      ALT (SGPT)  16      WBC 4.1   6.6    Hemoglobin 13.6   14.9    Hematocrit 39.4   44.8    Platelets 212   255    Total Cholesterol   201 (A)     Triglycerides   59     HDL Cholesterol   59     LDL Cholesterol    131 (A)     (A) Abnormal value       Comments are available for some flowsheets but are not being displayed.                     Assessment and Plan   Diagnoses and all orders for this visit:    1. Viral URI (Primary)  -     Covid-19 + Flu A&B AG, Veritor    2. Nausea  -     promethazine (PHENERGAN) 25 MG tablet; Take 1 tablet by mouth Every 6 (Six) Hours As Needed for Nausea or Vomiting for up to 5 days.  Dispense: 15 tablet; Refill: 0    3. Suspected COVID-19 virus infection  -     Covid-19 + Flu A&B AG, Veritor    4. Chronic bilateral low back pain without sciatica  -     methocarbamol (ROBAXIN) 500 MG tablet; Take 1 tablet by mouth 4 (Four) Times a Day.  Dispense: 30 tablet; Refill: 0    1-3. Viral URI-with negative COVID-19 test performed at home and in office, will begin Mucinex for increased drainage and treat nausea with Phenergan as needed.  4. Chronic low back pain-she c/o intermittent muscle spasms and tightness which improved with Robaxin as needed.       Follow Up   Return if symptoms worsen or fail to improve, for Next scheduled follow up.  Patient was given instructions and counseling regarding her condition or for health maintenance advice. Please see specific information pulled into the AVS if appropriate.

## 2022-09-13 ENCOUNTER — TREATMENT (OUTPATIENT)
Dept: PHYSICAL THERAPY | Facility: CLINIC | Age: 62
End: 2022-09-13

## 2022-09-13 DIAGNOSIS — G89.29 CHRONIC BILATERAL LOW BACK PAIN WITHOUT SCIATICA: Primary | ICD-10-CM

## 2022-09-13 DIAGNOSIS — M53.3 SACROILIAC JOINT DYSFUNCTION: ICD-10-CM

## 2022-09-13 DIAGNOSIS — M54.50 CHRONIC BILATERAL LOW BACK PAIN WITHOUT SCIATICA: Primary | ICD-10-CM

## 2022-09-13 PROCEDURE — 97530 THERAPEUTIC ACTIVITIES: CPT | Performed by: PHYSICAL THERAPIST

## 2022-09-13 PROCEDURE — 97014 ELECTRIC STIMULATION THERAPY: CPT | Performed by: PHYSICAL THERAPIST

## 2022-09-13 PROCEDURE — 97110 THERAPEUTIC EXERCISES: CPT | Performed by: PHYSICAL THERAPIST

## 2022-09-13 PROCEDURE — 97035 APP MDLTY 1+ULTRASOUND EA 15: CPT | Performed by: PHYSICAL THERAPIST

## 2022-09-13 PROCEDURE — 97162 PT EVAL MOD COMPLEX 30 MIN: CPT | Performed by: PHYSICAL THERAPIST

## 2022-09-13 NOTE — PROGRESS NOTES
Physical Therapy Initial Evaluation and Plan of Care    Patient Name: Paula Trevino          :  1960  Referring Physician: Nitza Mena APRN  Diagnosis: Chronic bilateral low back pain without sciatica [M54.50, G89.29]    Date of Evaluation: 2022  ______________________________________________________________________    Subjective Evaluation    History of Present Illness  Onset date: A couple of years - recently got worse -   Mechanism of injury: Fell at work - a few months ago - dog pulled her down to her knees - hurt her knees -   worsening low back pain which has been recurrent over the past several months. Denies change in bowel or bladder function.      Patient Occupation: [employer] -  Pain  Current pain ratin  At worst pain ratin  Location: LB - L>R;  up higher and hip and knee at times (L) - Denies NT-ing in LLE as far as she can remember -   Quality: Sharp pain / Throb.  Alleviating factors: Robaxin; Gabapentin; Can't tolerate anti-inflam;   Exacerbated by: Standing, bending, lifting, pulled alot by dogs - Pushing;  Rising from sitting; Going up> down stairs.  Progression: worsening    Social Support  Patient lives at: Home w/ stairs;     Diagnostic Tests  X-ray: abnormal    Treatments  Current treatment: medication  Patient Goals  Patient/family treatment goals: Pain alleviation; Mobility to allow ADLs and normal job and hobbies, etc.       Xray Results 2022    FINDINGS: There is approximately 10 degrees of lumbar scoliosis  convexity towards the right. No compression deformity, spondylolysis or  spondylolisthesis. Lower lumbar facet hypertrophy seen. Disc space  narrowing demonstrated at L2-L3, L3-L4 and L4-L5.   2022.  ___________________________________________________  Objective          Postural Observations    Additional Postural Observation Details  Scolitic curve to (L) mid/lower Tspine  (L) Iliac crest and ASIS higher / PSIS lower  Antalgic gait  LLE    Tenderness     Additional Tenderness Details  Tender over (L) SI Jt -   Tender L4-S1 central and (L)    Active Range of Motion     Additional Active Range of Motion Details  WLF w/ limitation w/ SB (L) w/ pain (L) SI / hip region -     Strength/Myotome Testing     Additional Strength Details  LE myotomes grossly intact (B)                                       Tests     Additional Tests Details  (-) SLR;   (+) SI Jt dysfunction / Upshear w/ Posterior Rotated ilium (L)          See Treatment Flow sheet for Exercises, Manual therapy, and modalities.   FUNCTIONAL ACTIVITIES:   · TAPING / BRACING: NA  · Anatomy / Dysfunction education -  · Jt protection, ADL modification; Posture and     ___________________________________________________  Assessment & Plan     Assessment    Assessment details: 62 yo female: Chronic (B) LBP w/o Sciatica; SI Jt dysfunction  Pt noted decreased pain and improved mobility w/min pain after PT -     PROBLEMS: Pain; Limited mobility; Impaired tolerance to ADLs, job, etc;   PROGNOSIS: Good    GOALS:   SHORT TERM GOALS: 2 weeks:  1) HEP Initiated; 2) Pain decreased 50%:   3) AROM Lumbar spine increased to WFL:  4) Improved functional ability grossly;   5) Instructed in proper posture and body mechanics     LONG TERM GOALS: 4 weeks (or at time of DISCHARGE): 1) (I) HEP; 2) AROM WFL and pain free; 3) Strength / mobility to be able to perform all ADL's and job-related activities w/o restrictions; 4) Pt demonstrates improved posture and body mechanics w/ minimal cuing -       Plan  Planned therapy interventions: abdominal trunk stabilization, body mechanics training, flexibility, home exercise program, manual therapy, neuromuscular re-education, postural training, spinal/joint mobilization, strengthening, stretching and therapeutic activities (Modalities prn; Taping / bracing prn; )  Frequency: 2x week  Duration in weeks: 4  Treatment plan discussed with:  patient      ___________________________________________________  Manual Therapy:         mins  01514;  Therapeutic Exercise:    15     mins  85214;     Neuromuscular Spencer:        mins  56044;    Therapeutic Activity:     08     mins  94832;   Self Care:                           mins  34071  Ultrasound:     08     mins  48797;  Iontophoresis:          mins  23822;    Electrical Stimulation:    15     mins  67233 ( );    Eval:   20   mins    Timed Treatment:   31   mins                  Total Treatment:     70   mins    PT SIGNATURE:   Willian Blount, PT  DATE TREATMENT INITIATED: 9/13/2022  ___________________________________________________  Initial Certification  Certification Period: 12/12/2022  I certify that the therapy services are furnished while this patient is under my care.  The services outlined above are required by this patient, and will be reviewed every 90 days.     PHYSICIAN: ________________________________  DATE: ______  Nitza Mena APRN        Please sign and return via fax to [phone].. Thank you, [facility] Physical Therapy.  ______________________________________________________________________  [address] [place] [address]  Phone: [phone] Fax: [phone]

## 2022-09-29 ENCOUNTER — TREATMENT (OUTPATIENT)
Dept: PHYSICAL THERAPY | Facility: CLINIC | Age: 62
End: 2022-09-29

## 2022-09-29 DIAGNOSIS — M54.50 CHRONIC BILATERAL LOW BACK PAIN WITHOUT SCIATICA: Primary | ICD-10-CM

## 2022-09-29 DIAGNOSIS — G89.29 CHRONIC BILATERAL LOW BACK PAIN WITHOUT SCIATICA: Primary | ICD-10-CM

## 2022-09-29 DIAGNOSIS — M53.3 SACROILIAC JOINT DYSFUNCTION: ICD-10-CM

## 2022-09-29 PROCEDURE — 97014 ELECTRIC STIMULATION THERAPY: CPT | Performed by: PHYSICAL THERAPIST

## 2022-09-29 PROCEDURE — 97110 THERAPEUTIC EXERCISES: CPT | Performed by: PHYSICAL THERAPIST

## 2022-09-29 PROCEDURE — 97530 THERAPEUTIC ACTIVITIES: CPT | Performed by: PHYSICAL THERAPIST

## 2022-09-29 NOTE — PROGRESS NOTES
"Physical Therapy Daily Progress Note    Patient Name: Paula Trevino         :  1960  Referring Physician: Nitza Mena APRN  Treatment Date: 2022  Date of Initial Visit: No linked episodes    Visit Diagnoses:    ICD-10-CM ICD-9-CM   1. Chronic bilateral low back pain without sciatica  M54.50 724.2    G89.29 338.29   2. Sacroiliac joint dysfunction  M53.3 724.6       _____________________________________________________    Subjective   Paula Trevino reports: felt better after PT, but had to go to work after PT -   C/O (L) LBP/Hip and knee pain - Knee pain w/ walking , stairs, squatting, twisting     Objective   (L) [place]lium / ASIS higher; PSIS lower;   (+) SI Jt dysfunction / Upshear w/ post rot ilium (L)  (L) knee diffusely swollen;  Patella tipped/ sits laterally   Very tender Lat>med PF jt; Very tender lateral > med joint line -   Xrays in July showed \"loose bodies within the lateral joint space. There  is mild degenerative change at the lateral tibiofemoral compartment.\"  Crepitus w/ AROM knee (L)    See Exercise, Manual, and Modality Logs for complete treatment.     Functional / Therapeutic Activities:    · TAPING / BRACING: K-tape to 1) Unload PF Jt and 2) Unload Ant/med, Ant/Lat knee (L)  · LB/SI assessment -   · Knee assessment  · Jt protection, ADL modification; Posture and      Assessment/Plan  62 yo female: Chronic (B) LBP w/o Sciatica; SI Jt dysfunction  Pt noted decreased pain and improved mobility w/min pain after PT -   (L) knee pain / swelling;  Apparent loose bodies per Xray report, OA, PF Jt dysfunction  Paula may benefit from Ortho referral for assessment of (L) knee -     Progress strengthening /stabilization /functional activity as pain allows -        _________________________________________________    Manual Therapy:                 mins  19847;  Therapeutic Exercise:   20    mins  04033;     Neuromuscular Spencer:        mins  24023;    Therapeutic Activity:     23 "      mins  07337;     Ultrasound:                          mins  92660;  Iontophoresis:                     mins  75025;    Electrical Stimulation:     15    mins  24912 ( );  Mechanical Traction:          mins  94550     Timed Treatment:  43    mins                  Total Treatment:     65    mins        Willian Blount PT  Physical Therapist  Lic # [id number]

## 2022-10-18 ENCOUNTER — TREATMENT (OUTPATIENT)
Dept: PHYSICAL THERAPY | Facility: CLINIC | Age: 62
End: 2022-10-18

## 2022-10-18 DIAGNOSIS — M54.50 CHRONIC BILATERAL LOW BACK PAIN WITHOUT SCIATICA: Primary | ICD-10-CM

## 2022-10-18 DIAGNOSIS — M54.16 RADICULOPATHY, LUMBAR REGION: ICD-10-CM

## 2022-10-18 DIAGNOSIS — M53.3 SACROILIAC JOINT DYSFUNCTION: ICD-10-CM

## 2022-10-18 DIAGNOSIS — G89.29 CHRONIC BILATERAL LOW BACK PAIN WITHOUT SCIATICA: Primary | ICD-10-CM

## 2022-10-18 PROCEDURE — 97012 MECHANICAL TRACTION THERAPY: CPT | Performed by: PHYSICAL THERAPIST

## 2022-10-18 PROCEDURE — 97530 THERAPEUTIC ACTIVITIES: CPT | Performed by: PHYSICAL THERAPIST

## 2022-10-18 PROCEDURE — 97110 THERAPEUTIC EXERCISES: CPT | Performed by: PHYSICAL THERAPIST

## 2022-10-18 NOTE — PROGRESS NOTES
Physical Therapy Daily Progress Note    Patient Name: Paula Trevino         :  1960  Referring Physician: Nitza Mena APRN  Treatment Date: 10/18/2022  Date of Initial Visit: No linked episodes    Visit Diagnoses:    ICD-10-CM ICD-9-CM   1. Chronic bilateral low back pain without sciatica  M54.50 724.2    G89.29 338.29   2. Sacroiliac joint dysfunction  M53.3 724.6       _____________________________________________________    Subjective   Paula Trevino reports: been worse for the last several days - pain in (L) LB and down the LLE to her foot -  Pain increased w/ sitting, bending, bending, carrying items of wt -   Knee feels like it is going to give out -     Objective   Guarded / antalgic gait;   AROM WNL except SB to (L) with pain (L) trunk -   Level pelvis - (-) SI Jt Dysfunction -   (-) seated SLR   (+) Supine SLR (L)  (+) LA distraction lumbar spine -   Tender L4-S1 central and to (L)  (+) PF Jt dysfunction (L) knee    See Exercise, Manual, and Modality Logs for complete treatment.     Functional / Therapeutic Activities:    • TAPING / BRACING: K-tape to 1) Unload PF Jt  (L) knee  • LB/SI assessment -   • Jt protection, ADL modification; Posture and       Assessment/Plan  60 yo female: Chronic (B) LBP w/o Sciatica; SI Jt dysfunction  (L) knee pain / swelling;  Apparent loose bodies per Xray report, OA, PF Jt dysfunction  Paula may benefit from Ortho referral for assessment of (L) knee -   Increased LLE radicular symptoms recently -   Pt may benefit from trial of lumbar traction -   Pt may require spine referral if no improvement in radicular symptoms -      Progress strengthening /stabilization /functional activity as pain allows - Assess / continue lumbar traction -      _________________________________________________     Manual Therapy:                 mins  25839;  Therapeutic Exercise:   10    mins  77155;     Neuromuscular Spencer:        mins  24787;    Therapeutic  Activity:     15      mins  32447;     Ultrasound:                          mins  41164;  Iontophoresis:                     mins  94514;    Electrical Stimulation:         mins  91010 ( );  Mechanical Traction:     15     mins  90678     Timed Treatment:  43    mins                  Total Treatment:     65    mins      Willian Blount PT  Physical Therapist  Lic # [id number]

## 2022-11-02 ENCOUNTER — TREATMENT (OUTPATIENT)
Dept: PHYSICAL THERAPY | Facility: CLINIC | Age: 62
End: 2022-11-02

## 2022-11-02 DIAGNOSIS — G89.29 CHRONIC BILATERAL LOW BACK PAIN WITHOUT SCIATICA: Primary | ICD-10-CM

## 2022-11-02 DIAGNOSIS — M53.3 SACROILIAC JOINT DYSFUNCTION: ICD-10-CM

## 2022-11-02 DIAGNOSIS — M54.50 CHRONIC BILATERAL LOW BACK PAIN WITHOUT SCIATICA: Primary | ICD-10-CM

## 2022-11-02 DIAGNOSIS — M54.16 RADICULOPATHY, LUMBAR REGION: ICD-10-CM

## 2022-11-02 PROCEDURE — 97012 MECHANICAL TRACTION THERAPY: CPT | Performed by: PHYSICAL THERAPIST

## 2022-11-02 PROCEDURE — 97110 THERAPEUTIC EXERCISES: CPT | Performed by: PHYSICAL THERAPIST

## 2022-11-02 NOTE — PROGRESS NOTES
Physical Therapy Daily Note    Patient Name: Paula Trevino         :  1960  Referring Physician: Nitza Mena APRN  Treatment Date: 2022  Date of Initial Visit: No linked episodes    Visit Diagnoses:    ICD-10-CM ICD-9-CM   1. Chronic bilateral low back pain without sciatica  M54.50 724.2    G89.29 338.29   2. Sacroiliac joint dysfunction  M53.3 724.6   3. Radiculopathy, lumbar region  M54.16 724.4       _____________________________________________________    Subjective   Paula Trevino reports: continued LBP and down LLE to lower leg - minimal improvement w/ PT  Increased w/ sitting, bending, lifting, etc  Prone and press ups lessens LE symptoms   Cat hit her (L) Thumb and it became very swollen    Objective   (L) Thumb IPJ mildly swollen vs (R) w/ healing abrasion dorsally -     See Exercise, Manual, and Modality Logs for complete treatment.     Functional / Therapeutic Activities:    • TAPING / BRACING: K-tape to 1) Unload PF Jt  (L) knee - HELD    • Jt protection, ADL modification; Posture and       Assessment/Plan  60 yo female: Chronic (B) LBP w/o Sciatica; SI Jt dysfunction  (L) knee pain / swelling;  Apparent loose bodies per Xray report, OA, PF Jt dysfunction  Increased and persistent LLE radicular symptoms -   Prone positiong and traction centralizes her radiculopathy, but returns -  Pt would benefit from further assessment (I.e spine referral) due to persistent LLE radicular symptoms -      Progress strengthening /stabilization /functional activity as pain allows - Assess / continue lumbar traction etc-      _________________________________________________     Manual Therapy:                 mins  18198;  Therapeutic Exercise:   20    mins  88015;     Neuromuscular Spencer:        mins  88827;    Therapeutic Activity:           mins  41545;     Ultrasound:                          mins  49476;  Iontophoresis:                     mins  92282;    Electrical  Stimulation:         mins  24595 ( );  Mechanical Traction:     20     mins  58540     Timed Treatment:  20    mins                  Total Treatment:     40    mins      Willian Blount PT  Physical Therapist  Lic # [id number]

## 2022-11-17 ENCOUNTER — TREATMENT (OUTPATIENT)
Dept: PHYSICAL THERAPY | Facility: CLINIC | Age: 62
End: 2022-11-17

## 2022-11-17 DIAGNOSIS — M54.50 CHRONIC BILATERAL LOW BACK PAIN WITHOUT SCIATICA: Primary | ICD-10-CM

## 2022-11-17 DIAGNOSIS — M25.562 LEFT KNEE PAIN, UNSPECIFIED CHRONICITY: ICD-10-CM

## 2022-11-17 DIAGNOSIS — M53.3 SACROILIAC JOINT DYSFUNCTION: ICD-10-CM

## 2022-11-17 DIAGNOSIS — M54.16 RADICULOPATHY, LUMBAR REGION: ICD-10-CM

## 2022-11-17 DIAGNOSIS — G89.29 CHRONIC BILATERAL LOW BACK PAIN WITHOUT SCIATICA: Primary | ICD-10-CM

## 2022-11-17 PROCEDURE — 97110 THERAPEUTIC EXERCISES: CPT | Performed by: PHYSICAL THERAPIST

## 2022-11-17 PROCEDURE — 97012 MECHANICAL TRACTION THERAPY: CPT | Performed by: PHYSICAL THERAPIST

## 2022-11-17 PROCEDURE — 97530 THERAPEUTIC ACTIVITIES: CPT | Performed by: PHYSICAL THERAPIST

## 2022-11-17 NOTE — PROGRESS NOTES
Physical Therapy Daily Note  RE-ASSESSMENT    Patient Name: Paula Trevino         :  1960  Referring Physician: Nitza Mena APRN  Treatment Date: 2022  Date of Initial Visit: No linked episodes    Visit Diagnoses:    ICD-10-CM ICD-9-CM   1. Chronic bilateral low back pain without sciatica  M54.50 724.2    G89.29 338.29   2. Sacroiliac joint dysfunction  M53.3 724.6   3. Radiculopathy, lumbar region  M54.16 724.4       _____________________________________________________    Subjective   Paula Trevino reports: did well after last session - everything felt better for about a week until this week - then pain increased in (L) LB and down LLE - also pain in (L) knee lateral, anterior knee -   LB/LE pain increased w/ prolonged standing, bending, pulling by dogs, rising from sitting -    Knee pain: Kneeling, Stairs, squatting, walking, twisting, lg dogs bump into her knee laterally -   Side/side mvmt and full Extension is painful in knee as well -      Objective   Pt ambulating w/ antalgic gait LLE -   Level pelvis - AROM WFL Lumbar spine w/ pain Lumbar / LSS w/ return from flexion  (+) SLR LLE    See Exercise, Manual, and Modality Logs for complete treatment.     Functional / Therapeutic Activities:    • TAPING / BRACING: K-tape to 1) Unload PF Jt  (L) knee - 2) Unload medial and lat knee (L)  • Functional assessment    • Jt protection, ADL modification; Posture and       Assessment/Plan  60 yo female: Chronic (B) LBP w/o Sciatica; SI Jt dysfunction  (L) knee pain / swelling;  Apparent loose bodies per Xray report, OA, PF Jt dysfunction  Increased and persistent LLE radicular symptoms -   Prone positiong and traction centralizes her radiculopathy, but returns -  Pt would benefit from further assessment (I.e spine referral) due to persistent LLE radicular symptoms -   GOAL STATUS:   STG: Most met  LTGs; Unmet pain, mobility, strength /  function goals      Hold PT due to plateau in  progress as recommend Pt seek further assessment from Ortho/Spine/Neuro surgeon for LB and Ortho for he (L) knee -   Goals unmet - DC PT to HEP -       _________________________________________________     Manual Therapy:                 mins  14575;  Therapeutic Exercise:   20    mins  37558;     Neuromuscular Spencer:        mins  12463;    Therapeutic Activity:       15    mins  67555;     Ultrasound:                          mins  49718;  Iontophoresis:                     mins  91823;    Electrical Stimulation:         mins  81175 ( );  Mechanical Traction:     20     mins  26918     Timed Treatment:  35    mins                  Total Treatment:     55    mins      Willian Blount, PT  Physical Therapist  Lic # [id number]

## 2022-12-07 DIAGNOSIS — F33.1 MODERATE EPISODE OF RECURRENT MAJOR DEPRESSIVE DISORDER: ICD-10-CM

## 2022-12-07 RX ORDER — VENLAFAXINE 37.5 MG/1
TABLET ORAL
Qty: 180 TABLET | Refills: 0 | Status: SHIPPED | OUTPATIENT
Start: 2022-12-07

## 2022-12-27 ENCOUNTER — OFFICE VISIT (OUTPATIENT)
Dept: INTERNAL MEDICINE | Facility: CLINIC | Age: 62
End: 2022-12-27

## 2022-12-27 VITALS
TEMPERATURE: 97.5 F | OXYGEN SATURATION: 98 % | HEART RATE: 74 BPM | WEIGHT: 158.6 LBS | SYSTOLIC BLOOD PRESSURE: 128 MMHG | DIASTOLIC BLOOD PRESSURE: 80 MMHG | HEIGHT: 59 IN | BODY MASS INDEX: 31.97 KG/M2

## 2022-12-27 DIAGNOSIS — Z12.31 ENCOUNTER FOR SCREENING MAMMOGRAM FOR MALIGNANT NEOPLASM OF BREAST: ICD-10-CM

## 2022-12-27 DIAGNOSIS — I10 ESSENTIAL HYPERTENSION: Chronic | ICD-10-CM

## 2022-12-27 DIAGNOSIS — J45.20 MILD INTERMITTENT ASTHMA WITHOUT COMPLICATION: Chronic | ICD-10-CM

## 2022-12-27 DIAGNOSIS — M54.42 CHRONIC LEFT-SIDED LOW BACK PAIN WITH LEFT-SIDED SCIATICA: ICD-10-CM

## 2022-12-27 DIAGNOSIS — M25.562 CHRONIC PAIN OF LEFT KNEE: ICD-10-CM

## 2022-12-27 DIAGNOSIS — M54.16 LUMBAR RADICULOPATHY: ICD-10-CM

## 2022-12-27 DIAGNOSIS — G89.29 CHRONIC PAIN OF LEFT KNEE: ICD-10-CM

## 2022-12-27 DIAGNOSIS — Z00.00 PHYSICAL EXAM: Primary | ICD-10-CM

## 2022-12-27 DIAGNOSIS — G89.29 CHRONIC LEFT-SIDED LOW BACK PAIN WITH LEFT-SIDED SCIATICA: ICD-10-CM

## 2022-12-27 PROBLEM — N30.00 ACUTE CYSTITIS WITHOUT HEMATURIA: Status: RESOLVED | Noted: 2022-08-02 | Resolved: 2022-12-27

## 2022-12-27 PROBLEM — M54.40 CHRONIC LEFT-SIDED LOW BACK PAIN WITH SCIATICA: Status: ACTIVE | Noted: 2021-02-16

## 2022-12-27 PROCEDURE — 99396 PREV VISIT EST AGE 40-64: CPT | Performed by: NURSE PRACTITIONER

## 2022-12-27 RX ORDER — ALBUTEROL SULFATE 90 UG/1
2 AEROSOL, METERED RESPIRATORY (INHALATION) EVERY 4 HOURS PRN
Qty: 6.7 G | Refills: 1 | Status: SHIPPED | OUTPATIENT
Start: 2022-12-27 | End: 2022-12-30 | Stop reason: SDUPTHER

## 2022-12-28 LAB
ALBUMIN SERPL-MCNC: 4.4 G/DL (ref 3.8–4.8)
ALBUMIN/GLOB SERPL: 2.4 {RATIO} (ref 1.2–2.2)
ALP SERPL-CCNC: 98 IU/L (ref 44–121)
ALT SERPL-CCNC: 15 IU/L (ref 0–32)
APPEARANCE UR: CLEAR
AST SERPL-CCNC: 21 IU/L (ref 0–40)
BILIRUB SERPL-MCNC: 0.8 MG/DL (ref 0–1.2)
BILIRUB UR QL STRIP: NEGATIVE
BUN SERPL-MCNC: 13 MG/DL (ref 8–27)
BUN/CREAT SERPL: 15 (ref 12–28)
CALCIUM SERPL-MCNC: 9.3 MG/DL (ref 8.7–10.3)
CHLORIDE SERPL-SCNC: 99 MMOL/L (ref 96–106)
CHOLEST SERPL-MCNC: 203 MG/DL (ref 100–199)
CO2 SERPL-SCNC: 25 MMOL/L (ref 20–29)
COLOR UR: YELLOW
CREAT SERPL-MCNC: 0.84 MG/DL (ref 0.57–1)
EGFRCR SERPLBLD CKD-EPI 2021: 78 ML/MIN/1.73
ERYTHROCYTE [DISTWIDTH] IN BLOOD BY AUTOMATED COUNT: 12.3 % (ref 11.7–15.4)
GLOBULIN SER CALC-MCNC: 1.8 G/DL (ref 1.5–4.5)
GLUCOSE SERPL-MCNC: 88 MG/DL (ref 70–99)
GLUCOSE UR QL STRIP: NEGATIVE
HCT VFR BLD AUTO: 39.8 % (ref 34–46.6)
HDLC SERPL-MCNC: 62 MG/DL
HGB BLD-MCNC: 14.3 G/DL (ref 11.1–15.9)
HGB UR QL STRIP: NEGATIVE
KETONES UR QL STRIP: NEGATIVE
LDLC SERPL CALC-MCNC: 127 MG/DL (ref 0–99)
LEUKOCYTE ESTERASE UR QL STRIP: NEGATIVE
MCH RBC QN AUTO: 29.2 PG (ref 26.6–33)
MCHC RBC AUTO-ENTMCNC: 35.9 G/DL (ref 31.5–35.7)
MCV RBC AUTO: 81 FL (ref 79–97)
MICRO URNS: NORMAL
NITRITE UR QL STRIP: NEGATIVE
PH UR STRIP: 6 [PH] (ref 5–7.5)
PLATELET # BLD AUTO: 219 X10E3/UL (ref 150–450)
POTASSIUM SERPL-SCNC: 4.7 MMOL/L (ref 3.5–5.2)
PROT SERPL-MCNC: 6.2 G/DL (ref 6–8.5)
PROT UR QL STRIP: NEGATIVE
RBC # BLD AUTO: 4.89 X10E6/UL (ref 3.77–5.28)
SODIUM SERPL-SCNC: 135 MMOL/L (ref 134–144)
SP GR UR STRIP: 1.01 (ref 1–1.03)
TRIGL SERPL-MCNC: 75 MG/DL (ref 0–149)
TSH SERPL DL<=0.005 MIU/L-ACNC: 1.8 UIU/ML (ref 0.45–4.5)
UROBILINOGEN UR STRIP-MCNC: 0.2 MG/DL (ref 0.2–1)
VLDLC SERPL CALC-MCNC: 14 MG/DL (ref 5–40)
WBC # BLD AUTO: 6 X10E3/UL (ref 3.4–10.8)

## 2022-12-30 ENCOUNTER — TELEPHONE (OUTPATIENT)
Dept: INTERNAL MEDICINE | Facility: CLINIC | Age: 62
End: 2022-12-30

## 2022-12-30 DIAGNOSIS — J45.20 MILD INTERMITTENT ASTHMA WITHOUT COMPLICATION: Chronic | ICD-10-CM

## 2022-12-30 DIAGNOSIS — F33.1 MODERATE EPISODE OF RECURRENT MAJOR DEPRESSIVE DISORDER: ICD-10-CM

## 2022-12-30 RX ORDER — MONTELUKAST SODIUM 10 MG/1
10 TABLET ORAL
Qty: 90 TABLET | Refills: 1 | Status: SHIPPED | OUTPATIENT
Start: 2022-12-30

## 2022-12-30 RX ORDER — ALBUTEROL SULFATE 90 UG/1
2 AEROSOL, METERED RESPIRATORY (INHALATION) EVERY 4 HOURS PRN
Qty: 6.7 G | Refills: 1 | Status: SHIPPED | OUTPATIENT
Start: 2022-12-30

## 2022-12-30 NOTE — TELEPHONE ENCOUNTER
Paula Trevino left a message stating she needs her two asthma medications refilled.  She can't remember the name of them, a pill and an inhaler.     Please advise.

## 2022-12-30 NOTE — ASSESSMENT & PLAN NOTE
Sx managed with Asmanex and Singular daily, does not use inhaler consistently due to cost of inhaler. She uses Albuterol sparingly for breakthrough sx.

## 2022-12-30 NOTE — ASSESSMENT & PLAN NOTE
BP is improved since starting lisinopril daily which she will continue along with a low sodium diet.

## 2022-12-30 NOTE — PROGRESS NOTES
Subjective   Paula Trevino is a 62 y.o. female who is here for a physical exam.    History of Present Illness  She has attended PT due to low back pain but unfortunately pain has not improved, pain starts in low back and radiates into bilateral legs. She is taking as needed for pain, intolerant to NSAIDs. No change in bowel or bladder function. L/s xray 22 showed DDD.       The following portions of the patient's history were reviewed and updated as appropriate: allergies, current medications, past social history and problem list.    Past Medical History:   Diagnosis Date   • Depression    • Hyperlipidemia    • Infectious mononucleosis    • Pregnancy        • Seasonal allergies          Current Outpatient Medications:   •  albuterol sulfate  (90 Base) MCG/ACT inhaler, Inhale 2 puffs Every 4 (Four) Hours As Needed for Wheezing or Shortness of Air., Disp: 6.7 g, Rfl: 1  •  lisinopril (PRINIVIL,ZESTRIL) 10 MG tablet, Take 1 tablet by mouth once daily, Disp: 90 tablet, Rfl: 3  •  methocarbamol (ROBAXIN) 500 MG tablet, Take 1 tablet by mouth 4 (Four) Times a Day., Disp: 30 tablet, Rfl: 0  •  Mometasone Furoate (Asmanex HFA) 200 MCG/ACT aerosol, Inhale 1 puff Daily., Disp: 1 inhaler, Rfl: 11  •  montelukast (SINGULAIR) 10 MG tablet, TAKE 1 TABLET BY MOUTH ONCE DAILY AT BEDTIME, Disp: 90 tablet, Rfl: 0  •  venlafaxine (EFFEXOR) 37.5 MG tablet, Take 1 tablet by mouth twice daily, Disp: 180 tablet, Rfl: 0    Allergies   Allergen Reactions   • Ampicillin Rash   • Celecoxib Rash   • Doxycycline Rash   • Erythromycin Rash   • Meperidine Rash   • Sulfa Antibiotics Rash       Review of Systems   Constitutional: Negative for activity change, appetite change, chills, diaphoresis, fatigue, fever and unexpected weight change.   HENT: Positive for congestion and postnasal drip. Negative for dental problem, drooling, ear discharge, ear pain, facial swelling, hearing loss, mouth sores, nosebleeds, rhinorrhea, sinus  "pressure, sore throat, tinnitus and trouble swallowing.    Eyes: Negative for photophobia, pain, discharge, redness, itching and visual disturbance.   Respiratory: Negative for apnea, cough, choking, chest tightness, shortness of breath and wheezing.    Cardiovascular: Negative for chest pain, palpitations and leg swelling.        No orthopnea, PND, MENDOZA   Gastrointestinal: Negative for abdominal pain, blood in stool, constipation, diarrhea, nausea and vomiting.   Endocrine: Negative for cold intolerance, heat intolerance, polydipsia and polyuria.   Genitourinary: Negative for decreased urine volume, dysuria, enuresis, flank pain, frequency, hematuria and urgency.   Musculoskeletal: Positive for arthralgias and back pain. Negative for gait problem, joint swelling, myalgias, neck pain and neck stiffness.   Skin: Negative for color change and rash.        No hair changes, no nail changes   Allergic/Immunologic: Negative for environmental allergies, food allergies and immunocompromised state.   Neurological: Negative for dizziness, tremors, seizures, syncope, speech difficulty, weakness, light-headedness, numbness and headaches.   Hematological: Negative for adenopathy. Does not bruise/bleed easily.   Psychiatric/Behavioral: Positive for decreased concentration and dysphoric mood. Negative for agitation, confusion, sleep disturbance and suicidal ideas. The patient is nervous/anxious.        Objective   Vitals:    12/27/22 1258   BP: 128/80   BP Location: Left arm   Patient Position: Sitting   Cuff Size: Adult   Pulse: 74   Temp: 97.5 °F (36.4 °C)   TempSrc: Temporal   SpO2: 98%   Weight: 71.9 kg (158 lb 9.6 oz)   Height: 149.9 cm (59\")     Physical Exam  Vitals and nursing note reviewed.   Constitutional:       General: She is not in acute distress.     Appearance: Normal appearance. She is well-developed. She is not diaphoretic.   HENT:      Head: Normocephalic and atraumatic.      Right Ear: Tympanic membrane, ear " canal and external ear normal.      Left Ear: Tympanic membrane, ear canal and external ear normal.      Nose: Nose normal. No rhinorrhea.      Mouth/Throat:      Mouth: Mucous membranes are moist.      Pharynx: Oropharynx is clear.   Eyes:      General:         Right eye: No discharge.         Left eye: No discharge.      Conjunctiva/sclera: Conjunctivae normal.   Cardiovascular:      Rate and Rhythm: Normal rate and regular rhythm.      Pulses: Normal pulses.      Heart sounds: Normal heart sounds.   Pulmonary:      Effort: Pulmonary effort is normal.      Breath sounds: Normal breath sounds.   Abdominal:      General: Bowel sounds are normal. There is no distension.      Palpations: Abdomen is soft.      Tenderness: There is no abdominal tenderness.   Musculoskeletal:         General: No swelling or tenderness.      Cervical back: Normal range of motion.      Lumbar back: Spasms present. No swelling. Decreased range of motion.      Comments: Moderate tenderness to palpation of paravertebral muscles of lumbar area     Skin:     General: Skin is warm and dry.      Findings: No erythema.   Neurological:      General: No focal deficit present.      Mental Status: She is alert and oriented to person, place, and time.      Sensory: No sensory deficit.      Motor: No abnormal muscle tone (5/5 normal muscle strength bilateral lower extremities. Strength and tone 5/5 in lower extremities (right quadriceps, left quadriceps, right tibialis anterior, left tibialis anterior)).      Coordination: Coordination normal.      Gait: Gait abnormal (Antalgic. Is able to do heel and toe walking bilaterally.).      Deep Tendon Reflexes:      Reflex Scores:       Patellar reflexes are 2+ on the right side and 2+ on the left side.       Achilles reflexes are 2+ on the right side and 2+ on the left side.     Comments: Negative straight leg raising test   Psychiatric:         Mood and Affect: Mood normal.         Behavior: Behavior normal.          Judgment: Judgment normal.         Assessment & Plan   Diagnoses and all orders for this visit:    1. Physical exam (Primary)  -     CBC (No Diff)  -     Comprehensive Metabolic Panel  -     Lipid Panel  -     TSH  -     Urinalysis With Microscopic If Indicated (No Culture) - Urine, Clean Catch    2. Essential hypertension  Assessment & Plan:  BP is improved since starting lisinopril daily which she will continue along with a low sodium diet.      3. Chronic left-sided low back pain with left-sided sciatica  Assessment & Plan:  She continues to c/o low back pain radiating into bilateral legs, no significant improvement with PT and use of Tylenol. Will obtain MRI of lumbar spine to further evaluate (xray 7/2022).      4. Lumbar radiculopathy  -     MRI Lumbar Spine Without Contrast; Future    5. Chronic pain of left knee  -     Ambulatory Referral to Orthopedic Surgery    6. Mild intermittent asthma without complication  Assessment & Plan:  Sx managed with Asmanex and Singular daily, does not use inhaler consistently due to cost of inhaler. She uses Albuterol sparingly for breakthrough sx.      Orders:  -     albuterol sulfate  (90 Base) MCG/ACT inhaler; Inhale 2 puffs Every 4 (Four) Hours As Needed for Wheezing or Shortness of Air.  Dispense: 6.7 g; Refill: 1    7. Encounter for screening mammogram for malignant neoplasm of breast  -     Mammo Screening Digital Tomosynthesis Bilateral With CAD; Future      Risk assessment:  She has a family hx (sister) of DM2 and breast cancer-check fasting glucose today. She will also schedule her overdue mammogram.  Her Body mass index is 32.03 kg/m². - She tries to remain active and follow a low-fat, low-cholesterol diet.    Prevention:  Health Maintenance   Topic Date Due   • Pneumococcal Vaccine 0-64 (1 - PCV) Never done   • ZOSTER VACCINE (1 of 2) Never done   • COVID-19 Vaccine (5 - Booster) 06/07/2021   • PAP SMEAR  05/17/2022   • ANNUAL PHYSICAL  12/14/2022    • MAMMOGRAM  12/14/2022   • INFLUENZA VACCINE  03/31/2023 (Originally 8/1/2022)   • LIPID PANEL  12/27/2023   • COLORECTAL CANCER SCREENING  09/09/2024   • TDAP/TD VACCINES (3 - Td or Tdap) 05/31/2031   • HEPATITIS C SCREENING  Completed       Discussed healthy lifestyle choices such as maintaining a balanced diet low in carbohydrates and limiting caffeine and alcohol intake.  Recommended routine exercise for bone strength and cardiovascular health.

## 2022-12-30 NOTE — ASSESSMENT & PLAN NOTE
She continues to c/o low back pain radiating into bilateral legs, no significant improvement with PT and use of Tylenol. Will obtain MRI of lumbar spine to further evaluate (xray 7/2022).

## 2023-01-11 DIAGNOSIS — I10 ESSENTIAL HYPERTENSION: ICD-10-CM

## 2023-01-11 RX ORDER — LISINOPRIL 10 MG/1
TABLET ORAL
Qty: 90 TABLET | Refills: 0 | Status: SHIPPED | OUTPATIENT
Start: 2023-01-11

## 2023-01-16 ENCOUNTER — HOSPITAL ENCOUNTER (OUTPATIENT)
Dept: MAMMOGRAPHY | Facility: HOSPITAL | Age: 63
Discharge: HOME | End: 2023-01-16
Admitting: NURSE PRACTITIONER
Payer: COMMERCIAL

## 2023-01-16 DIAGNOSIS — Z12.31 ENCOUNTER FOR SCREENING MAMMOGRAM FOR MALIGNANT NEOPLASM OF BREAST: ICD-10-CM

## 2023-01-16 PROCEDURE — 77063 BREAST TOMOSYNTHESIS BI: CPT

## 2023-01-16 PROCEDURE — 77067 SCR MAMMO BI INCL CAD: CPT

## 2023-01-19 ENCOUNTER — HOSPITAL ENCOUNTER (OUTPATIENT)
Dept: MRI IMAGING | Facility: HOSPITAL | Age: 63
Discharge: HOME | End: 2023-01-19
Admitting: NURSE PRACTITIONER
Payer: COMMERCIAL

## 2023-01-19 DIAGNOSIS — M54.16 LUMBAR RADICULOPATHY: ICD-10-CM

## 2023-01-19 PROCEDURE — 72148 MRI LUMBAR SPINE W/O DYE: CPT

## 2023-02-02 ENCOUNTER — OFFICE VISIT (OUTPATIENT)
Dept: INTERNAL MEDICINE | Facility: CLINIC | Age: 63
End: 2023-02-02
Payer: COMMERCIAL

## 2023-02-02 VITALS
TEMPERATURE: 97.7 F | OXYGEN SATURATION: 99 % | DIASTOLIC BLOOD PRESSURE: 80 MMHG | WEIGHT: 158 LBS | HEART RATE: 60 BPM | BODY MASS INDEX: 31.85 KG/M2 | HEIGHT: 59 IN | SYSTOLIC BLOOD PRESSURE: 142 MMHG

## 2023-02-02 DIAGNOSIS — J02.9 SORE THROAT: Primary | ICD-10-CM

## 2023-02-02 DIAGNOSIS — R05.1 ACUTE COUGH: ICD-10-CM

## 2023-02-02 DIAGNOSIS — R68.83 CHILLS: ICD-10-CM

## 2023-02-02 LAB
EXPIRATION DATE: NORMAL
EXPIRATION DATE: NORMAL
FLUAV AG UPPER RESP QL IA.RAPID: NOT DETECTED
FLUBV AG UPPER RESP QL IA.RAPID: NOT DETECTED
INTERNAL CONTROL: NORMAL
INTERNAL CONTROL: NORMAL
Lab: NORMAL
Lab: NORMAL
S PYO AG THROAT QL: NEGATIVE
SARS-COV-2 AG UPPER RESP QL IA.RAPID: NOT DETECTED

## 2023-02-02 PROCEDURE — 87428 SARSCOV & INF VIR A&B AG IA: CPT

## 2023-02-02 PROCEDURE — 99213 OFFICE O/P EST LOW 20 MIN: CPT

## 2023-02-02 PROCEDURE — 87880 STREP A ASSAY W/OPTIC: CPT

## 2023-02-02 RX ORDER — DEXTROMETHORPHAN HYDROBROMIDE AND PROMETHAZINE HYDROCHLORIDE 15; 6.25 MG/5ML; MG/5ML
5 SYRUP ORAL 4 TIMES DAILY PRN
Qty: 180 ML | Refills: 1 | Status: SHIPPED | OUTPATIENT
Start: 2023-02-02

## 2023-02-02 NOTE — PATIENT INSTRUCTIONS
Take 5 mL of promethazine-DM up to 4 times to day. every Take Tylenol for fever.  Recommend regular guaifenesin (Mucinex) to keep any secretions thin.  Recommend warm compress to neck. Stay hydrated with water.

## 2023-02-02 NOTE — PROGRESS NOTES
"Chief Complaint  Cough and Headache (Cough, sneezing, headache and chest is tight)    Subjective        Paula Trevino presents to [facility]  History of Present Illness  62-year-old female presenting with cough and sore throat.  Patient of JOSE Salinas.  Symptoms started on Sunday including sore throat, cough and sneezing.  States Tuesday was the worst including chills, sore throat that feels raw and decreased appetite.  Has been having headaches and runny nose as well.  Denies difficulty breathing, wheezing, productive cough.  States some right ear pain in that right side of her throat is more sore than her left.  Treating with Delsym and Tylenol.    Cough  Associated symptoms include headaches.   Headache      Objective   Vital Signs:  /80   Pulse 60   Temp 97.7 °F (36.5 °C)   Ht 149.9 cm (59\")   Wt 71.7 kg (158 lb)   SpO2 99%   BMI 31.91 kg/m²   Estimated body mass index is 31.91 kg/m² as calculated from the following:    Height as of this encounter: 149.9 cm (59\").    Weight as of this encounter: 71.7 kg (158 lb).             Physical Exam  Vitals reviewed.   Constitutional:       Appearance: Normal appearance.   HENT:      Head: Normocephalic.      Right Ear: Tympanic membrane is erythematous. Tympanic membrane is not bulging.      Left Ear: Tympanic membrane normal.      Nose: Rhinorrhea present. No congestion.      Mouth/Throat:      Pharynx: Posterior oropharyngeal erythema present. No oropharyngeal exudate.   Cardiovascular:      Rate and Rhythm: Normal rate and regular rhythm.      Pulses: Normal pulses.      Heart sounds: Normal heart sounds.   Pulmonary:      Effort: Pulmonary effort is normal.      Breath sounds: Normal breath sounds.   Musculoskeletal:         General: Normal range of motion.      Cervical back: Normal range of motion.   Skin:     General: Skin is warm and dry.      Capillary Refill: Capillary refill takes less than 2 seconds. "   Neurological:      General: No focal deficit present.      Mental Status: She is alert and oriented to person, place, and time.   Psychiatric:         Mood and Affect: Mood normal.         Behavior: Behavior normal.         Thought Content: Thought content normal.         Judgment: Judgment normal.        Result Review :    Common labs    Common Labs 7/15/22 7/25/22 12/27/22 12/27/22 12/27/22      1431 1431 1431   Glucose    88    BUN    13    Creatinine  0.90  0.84    Sodium    135    Potassium    4.7    Chloride    99    Calcium    9.3    Total Protein    6.2    Albumin    4.4    Total Bilirubin    0.8    Alkaline Phosphatase    98    AST (SGOT)    21    ALT (SGPT)    15    WBC 6.6  6.0     Hemoglobin 14.9  14.3     Hematocrit 44.8  39.8     Platelets 255  219     Total Cholesterol     203 (A)   Triglycerides     75   HDL Cholesterol     62   LDL Cholesterol      127 (A)   (A) Abnormal value       Comments are available for some flowsheets but are not being displayed.           Current Outpatient Medications on File Prior to Visit   Medication Sig Dispense Refill   • albuterol sulfate  (90 Base) MCG/ACT inhaler Inhale 2 puffs Every 4 (Four) Hours As Needed for Wheezing or Shortness of Air. 6.7 g 1   • lisinopril (PRINIVIL,ZESTRIL) 10 MG tablet Take 1 tablet by mouth once daily 90 tablet 0   • montelukast (SINGULAIR) 10 MG tablet Take 1 tablet by mouth every night at bedtime. 90 tablet 1   • venlafaxine (EFFEXOR) 37.5 MG tablet Take 1 tablet by mouth twice daily 180 tablet 0   • methocarbamol (ROBAXIN) 500 MG tablet Take 1 tablet by mouth 4 (Four) Times a Day. 30 tablet 0   • Mometasone Furoate (Asmanex HFA) 200 MCG/ACT aerosol Inhale 1 puff Daily. 1 inhaler 11     No current facility-administered medications on file prior to visit.                    Assessment and Plan   Diagnoses and all orders for this visit:    1. Sore throat (Primary)  -     POCT rapid strep A  -     POCT SARS-CoV-2 Antigen MARIA ALEJANDRA +  Flu    2. Chills  -     POCT rapid strep A  -     POCT SARS-CoV-2 Antigen MARIA ALEJANDRA + Flu    3. Acute cough  -     promethazine-dextromethorphan (PROMETHAZINE-DM) 6.25-15 MG/5ML syrup; Take 5 mL by mouth 4 (Four) Times a Day As Needed for Cough.  Dispense: 180 mL; Refill: 1      Patient Instructions   Take 5 mL of promethazine-DM up to 4 times to day. every Take Tylenol for fever.  Recommend regular guaifenesin (Mucinex) to keep any secretions thin.  Recommend warm compress to neck. Stay hydrated with water.           Follow Up   No follow-ups on file.  Patient was given instructions and counseling regarding her condition or for health maintenance advice. Please see specific information pulled into the AVS if appropriate.

## 2023-04-19 DIAGNOSIS — I10 ESSENTIAL HYPERTENSION: ICD-10-CM

## 2023-04-19 RX ORDER — LISINOPRIL 10 MG/1
TABLET ORAL
Qty: 90 TABLET | Refills: 0 | Status: SHIPPED | OUTPATIENT
Start: 2023-04-19

## 2023-06-19 DIAGNOSIS — F33.1 MODERATE EPISODE OF RECURRENT MAJOR DEPRESSIVE DISORDER: ICD-10-CM

## 2023-06-19 RX ORDER — VENLAFAXINE 37.5 MG/1
TABLET ORAL
Qty: 180 TABLET | Refills: 0 | Status: SHIPPED | OUTPATIENT
Start: 2023-06-19
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
## 2023-06-28 PROBLEM — M17.12 PRIMARY OSTEOARTHRITIS OF LEFT KNEE: Chronic | Status: ACTIVE | Noted: 2023-06-28

## 2023-06-28 PROBLEM — M25.512 ACUTE PAIN OF LEFT SHOULDER: Status: ACTIVE | Noted: 2023-06-28

## 2023-06-28 PROBLEM — M17.12 PRIMARY OSTEOARTHRITIS OF LEFT KNEE: Status: ACTIVE | Noted: 2023-06-28

## 2023-08-07 ENCOUNTER — PRIOR AUTHORIZATION (OUTPATIENT)
Dept: INTERNAL MEDICINE | Facility: CLINIC | Age: 63
End: 2023-08-07
Payer: COMMERCIAL

## 2023-08-07 DIAGNOSIS — J45.20 MILD INTERMITTENT ASTHMA WITHOUT COMPLICATION: Primary | ICD-10-CM

## 2023-08-07 RX ORDER — FLUTICASONE PROPIONATE 110 UG/1
1 AEROSOL, METERED RESPIRATORY (INHALATION)
Qty: 12 G | Refills: 1 | Status: SHIPPED | OUTPATIENT
Start: 2023-08-07

## 2023-08-30 DIAGNOSIS — J45.20 MILD INTERMITTENT ASTHMA WITHOUT COMPLICATION: Primary | ICD-10-CM

## 2023-08-30 RX ORDER — BUDESONIDE 90 UG/1
1 AEROSOL, POWDER RESPIRATORY (INHALATION)
Qty: 1 EACH | Refills: 1 | Status: SHIPPED | OUTPATIENT
Start: 2023-08-30

## 2023-09-06 ENCOUNTER — OFFICE VISIT (OUTPATIENT)
Dept: INTERNAL MEDICINE | Facility: CLINIC | Age: 63
End: 2023-09-06
Payer: COMMERCIAL

## 2023-09-06 VITALS
HEART RATE: 70 BPM | HEIGHT: 59 IN | OXYGEN SATURATION: 96 % | SYSTOLIC BLOOD PRESSURE: 112 MMHG | DIASTOLIC BLOOD PRESSURE: 74 MMHG | BODY MASS INDEX: 34.15 KG/M2 | WEIGHT: 169.4 LBS

## 2023-09-06 DIAGNOSIS — M25.512 ACUTE PAIN OF LEFT SHOULDER: ICD-10-CM

## 2023-09-06 DIAGNOSIS — J30.89 NON-SEASONAL ALLERGIC RHINITIS, UNSPECIFIED TRIGGER: Chronic | ICD-10-CM

## 2023-09-06 DIAGNOSIS — J45.20 MILD INTERMITTENT ASTHMA WITHOUT COMPLICATION: Primary | Chronic | ICD-10-CM

## 2023-09-06 DIAGNOSIS — G89.29 CHRONIC LEFT-SIDED LOW BACK PAIN WITH LEFT-SIDED SCIATICA: ICD-10-CM

## 2023-09-06 DIAGNOSIS — M54.42 CHRONIC LEFT-SIDED LOW BACK PAIN WITH LEFT-SIDED SCIATICA: ICD-10-CM

## 2023-09-06 PROCEDURE — 99214 OFFICE O/P EST MOD 30 MIN: CPT | Performed by: NURSE PRACTITIONER

## 2023-09-06 RX ORDER — FLUTICASONE FUROATE, UMECLIDINIUM BROMIDE AND VILANTEROL TRIFENATATE 100; 62.5; 25 UG/1; UG/1; UG/1
1 POWDER RESPIRATORY (INHALATION)
Qty: 1 EACH | Refills: 0
Start: 2023-09-06

## 2023-09-06 NOTE — PROGRESS NOTES
"Chief Complaint  Asthma, Allergies, and Shoulder Pain  Subjective        Paula Trevino presents to [facility]  History of Present Illness    The patient presents today for a follow-visit up regarding asthma, allergies and c/o left shoulder pain.    The patient reports significant dyspnea and constant cough. Insurance difficulties have been encountered with regard to coverage for inhalers. Asmanex was not covered and was switched to fluticasone inhaler, which also was not covered. Pulmicort was prescribed last week, and she plans to obtain Pulmicort when she is paid. In the meantime she has been out of an inhalers, c/o intermittent chest tightness with wheezing.    The patient c/o a sore throat and bilateral ear pressure currently, and a few days ago, She also notes sinus pressure with nausea, nausea    At her last follow-up visit, she was experiencing left shoulder pain. She underwent left shoulder steroid injection with Dr. Romo approximately 3 months ago, which did alleviate her pain approximately 2 days following injection. She subsequently experienced weight gain, despite no change in her habits. She denies left shoulder pain currently.    The patient suffered multiple dog bites to her right hand a few days ago. She was bitten by a 5-pound dog, who was startled by other dogs at the time. She noted right hand swelling and soreness initially and denies signs of infection currently. Last Tdap was 5/31/21.    Objective   Vital Signs:  /74 (BP Location: Left arm, Patient Position: Sitting, Cuff Size: Adult)   Pulse 70   Ht 149.9 cm (59\")   Wt 76.8 kg (169 lb 6.4 oz)   SpO2 96%   BMI 34.21 kg/m²   Estimated body mass index is 34.21 kg/m² as calculated from the following:    Height as of this encounter: 149.9 cm (59\").    Weight as of this encounter: 76.8 kg (169 lb 6.4 oz).            Physical Exam  Constitutional:       Appearance: She is well-developed. She is not " ill-appearing.   HENT:      Head: Normocephalic.      Right Ear: Hearing, tympanic membrane and external ear normal. Tympanic membrane is bulging.      Left Ear: Hearing, tympanic membrane and external ear normal. Tympanic membrane is bulging.      Nose: Nose normal. No nasal deformity, mucosal edema or rhinorrhea.      Right Sinus: No maxillary sinus tenderness or frontal sinus tenderness.      Left Sinus: No maxillary sinus tenderness or frontal sinus tenderness.      Mouth/Throat:      Dentition: Normal dentition.      Pharynx: Posterior oropharyngeal erythema present.   Eyes:      General: Lids are normal.         Right eye: No discharge.         Left eye: No discharge.      Conjunctiva/sclera: Conjunctivae normal.      Right eye: No exudate.     Left eye: No exudate.  Neck:      Thyroid: No thyroid mass or thyromegaly.      Vascular: No carotid bruit.      Trachea: Trachea normal.   Cardiovascular:      Rate and Rhythm: Regular rhythm.      Pulses: Normal pulses.      Heart sounds: Normal heart sounds. No murmur heard.  Pulmonary:      Effort: No respiratory distress.      Breath sounds: Normal breath sounds. No decreased breath sounds, wheezing, rhonchi or rales.   Abdominal:      General: Bowel sounds are normal.      Palpations: Abdomen is soft.      Tenderness: There is no abdominal tenderness.   Musculoskeletal:      Cervical back: Normal range of motion. No edema.      Comments: Tenderness to palpation of sternum is noted.   Lymphadenopathy:      Head:      Right side of head: No submental, submandibular, tonsillar, preauricular, posterior auricular or occipital adenopathy.      Left side of head: No submental, submandibular, tonsillar, preauricular, posterior auricular or occipital adenopathy.      Cervical: No cervical adenopathy.   Skin:     General: Skin is warm and dry.      Nails: There is no clubbing.   Neurological:      Mental Status: She is alert.   Psychiatric:         Behavior: Behavior is  cooperative.       Result Review :  The following data was reviewed by: JOSE Mueller on 09/06/2023:  Common labs          12/27/2022    14:31 5/6/2023    03:44   Common Labs   Glucose 88     BUN 13     Creatinine 0.84     Sodium 135     Potassium 4.7     Chloride 99     Calcium 9.3     Total Protein 6.2     Albumin 4.4     Total Bilirubin 0.8     Alkaline Phosphatase 98     AST (SGOT) 21     ALT (SGPT) 15     WBC 6.0  6.15       Hemoglobin 14.3  13.4       Hematocrit 39.8  40.3       Platelets 219  218       Total Cholesterol 203     Triglycerides 75     HDL Cholesterol 62     LDL Cholesterol  127        Details          This result is from an external source.             Data reviewed : Consultant notes ortho 5/9/23             Assessment and Plan   Diagnoses and all orders for this visit:    1. Mild intermittent asthma without complication (Primary)  Assessment & Plan:  A 2-week supply of Trelegy samples was provided. She will use Trelegy 1 puff once daily until she is able to obtain Pulmicort, and she was instructed in the use of Trelegy.    Orders:  -     Fluticasone-Umeclidin-Vilant (Trelegy Ellipta) 100-62.5-25 MCG/ACT inhaler; Inhale 1 puff Daily. Rinse and spit after use  Dispense: 1 each; Refill: 0    2. Acute pain of left shoulder  Assessment & Plan:  Pain is improved since receiving steroid injection, continue to monitor.      3. Non-seasonal allergic rhinitis, unspecified trigger  Assessment & Plan:  She will continue Singulair and Claritin, samples of Mucinex given due to recurrent cough and sinus pressure.    Orders:  -     guaiFENesin (Mucinex) 600 MG 12 hr tablet; Take 1 tablet by mouth Every 12 (Twelve) Hours for 7 days.  Dispense: 14 tablet; Refill: 0    4. Chronic left-sided low back pain with left-sided sciatica  Assessment & Plan:  She notes intermittent low back pain, worse with prolonged standing. She has been referred to PT in past, encouraged to continue stretching  exercises.             Follow Up   Return if symptoms worsen or fail to improve, for Next scheduled follow up.  Patient was given instructions and counseling regarding her condition or for health maintenance advice. Please see specific information pulled into the AVS if appropriate.       Transcribed from ambient dictation for JOSE Mueller by Venus Hahn.  09/06/23   20:10 EDT    Patient or patient representative verbalized consent to the visit recording.  I have personally performed the services described in this document as transcribed by the above individual, and it is both accurate and complete.

## 2023-09-07 NOTE — ASSESSMENT & PLAN NOTE
A 2-week supply of Trelegy samples was provided. She will use Trelegy 1 puff once daily until she is able to obtain Pulmicort, and she was instructed in the use of Trelegy.

## 2023-09-09 RX ORDER — GUAIFENESIN 600 MG/1
600 TABLET, EXTENDED RELEASE ORAL EVERY 12 HOURS SCHEDULED
Qty: 14 TABLET | Refills: 0 | COMMUNITY
Start: 2023-09-09 | End: 2023-09-16

## 2023-09-09 NOTE — ASSESSMENT & PLAN NOTE
She will continue Singulair and Claritin, samples of Mucinex given due to recurrent cough and sinus pressure.

## 2023-09-09 NOTE — ASSESSMENT & PLAN NOTE
She notes intermittent low back pain, worse with prolonged standing. She has been referred to PT in past, encouraged to continue stretching exercises.

## 2023-09-11 DIAGNOSIS — F33.1 MODERATE EPISODE OF RECURRENT MAJOR DEPRESSIVE DISORDER: ICD-10-CM

## 2023-09-12 RX ORDER — VENLAFAXINE 37.5 MG/1
TABLET ORAL
Qty: 180 TABLET | Refills: 0 | Status: SHIPPED | OUTPATIENT
Start: 2023-09-12

## 2023-10-04 ENCOUNTER — OFFICE VISIT (OUTPATIENT)
Dept: INTERNAL MEDICINE | Facility: CLINIC | Age: 63
End: 2023-10-04
Payer: COMMERCIAL

## 2023-10-04 VITALS
TEMPERATURE: 99 F | WEIGHT: 167 LBS | SYSTOLIC BLOOD PRESSURE: 128 MMHG | BODY MASS INDEX: 33.67 KG/M2 | HEIGHT: 59 IN | HEART RATE: 84 BPM | OXYGEN SATURATION: 97 % | DIASTOLIC BLOOD PRESSURE: 74 MMHG

## 2023-10-04 DIAGNOSIS — H93.8X3 CONGESTION OF BOTH EARS: ICD-10-CM

## 2023-10-04 DIAGNOSIS — R68.83 CHILLS: Primary | ICD-10-CM

## 2023-10-04 DIAGNOSIS — J06.9 UPPER RESPIRATORY TRACT INFECTION, UNSPECIFIED TYPE: ICD-10-CM

## 2023-10-04 DIAGNOSIS — R51.9 ACUTE NONINTRACTABLE HEADACHE, UNSPECIFIED HEADACHE TYPE: ICD-10-CM

## 2023-10-04 LAB
EXPIRATION DATE: NORMAL
FLUAV AG UPPER RESP QL IA.RAPID: NOT DETECTED
FLUBV AG UPPER RESP QL IA.RAPID: NOT DETECTED
INTERNAL CONTROL: NORMAL
Lab: NORMAL
SARS-COV-2 AG UPPER RESP QL IA.RAPID: NOT DETECTED

## 2023-10-04 PROCEDURE — 87428 SARSCOV & INF VIR A&B AG IA: CPT

## 2023-10-04 PROCEDURE — 99213 OFFICE O/P EST LOW 20 MIN: CPT

## 2023-10-04 RX ORDER — METHYLPREDNISOLONE 4 MG/1
TABLET ORAL
Qty: 21 TABLET | Refills: 0 | Status: SHIPPED | OUTPATIENT
Start: 2023-10-04

## 2023-10-04 NOTE — PROGRESS NOTES
Chief Complaint  Cough, Headache, Fatigue, Chills, Nausea, and Ear Fullness     Subjective:      History of Present Illness {CC  Problem List  Visit  Diagnosis   Encounters  Notes  Medications  Labs  Result Review Imaging  Media :23}     Paula Trevino presents to [facility] for:      History of Present Illness  Pt states She started feeling sick 5 days ago. Pt states that she has congestion, nausea, fatigue, headache, chills, and bilateral ear fullness. PT states she has had to call into work multiple days d/t this illness. Pt staes there have been sick people at work. Pt states she has not tested for covid at home. The only OTC medication she was using was mucinex, but ran out recently.   Chills  This is a new problem. The current episode started in the past 7 days. The problem occurs intermittently. The problem has been gradually worsening. Associated symptoms include chills, congestion, coughing, fatigue, headaches, myalgias and nausea. Pertinent negatives include no fever, sore throat or vomiting. Nothing aggravates the symptoms. She has tried rest for the symptoms.   Cough  This is a new problem. The current episode started in the past 7 days. The cough is Non-productive. Associated symptoms include chills, ear congestion, headaches, myalgias, postnasal drip and sweats. Pertinent negatives include no fever or sore throat. Nothing aggravates the symptoms. She has tried rest for the symptoms.   Ear Fullness   There is pain in both ears. This is a new problem. The current episode started in the past 7 days. The problem has been gradually worsening. There has been no fever. Associated symptoms include coughing and headaches. Pertinent negatives include no sore throat or vomiting. She has tried nothing for the symptoms.   Nausea  This is a new problem. The current episode started in the past 7 days. The problem occurs intermittently. The problem has been gradually  worsening. Associated symptoms include chills, congestion, coughing, fatigue, headaches, myalgias and nausea. Pertinent negatives include no fever, sore throat or vomiting. Nothing aggravates the symptoms.          I have reviewed patient's medical history, any new submitted information provided by patient or medical assistant and updated medical record.      Objective:      Physical Exam  Vitals reviewed.   Constitutional:       Appearance: Normal appearance. She is ill-appearing.   HENT:      Head: Normocephalic.      Right Ear: Tympanic membrane, ear canal and external ear normal. There is no impacted cerumen.      Left Ear: Tympanic membrane, ear canal and external ear normal. There is no impacted cerumen.      Nose: Nose normal. Congestion present. No rhinorrhea.      Mouth/Throat:      Mouth: Mucous membranes are moist.      Pharynx: Oropharynx is clear. Posterior oropharyngeal erythema present. No oropharyngeal exudate.   Eyes:      General:         Right eye: No discharge.         Left eye: No discharge.      Extraocular Movements: Extraocular movements intact.      Conjunctiva/sclera: Conjunctivae normal.      Pupils: Pupils are equal, round, and reactive to light.   Cardiovascular:      Rate and Rhythm: Normal rate and regular rhythm.      Pulses: Normal pulses.      Heart sounds: Normal heart sounds.   Pulmonary:      Effort: Pulmonary effort is normal.      Breath sounds: Normal breath sounds.   Lymphadenopathy:      Cervical: Cervical adenopathy present.   Skin:     General: Skin is warm and dry.      Capillary Refill: Capillary refill takes less than 2 seconds.   Neurological:      General: No focal deficit present.      Mental Status: She is alert.   Psychiatric:         Mood and Affect: Mood normal.         Behavior: Behavior normal.      Result Review  Data Reviewed:{ Labs  Result Review  Imaging  Med Tab  Media :23}                Vital Signs:   /74 (BP Location: Left arm, Patient  "Position: Sitting, Cuff Size: Adult)   Pulse 84   Temp 99 °F (37.2 °C) (Oral)   Ht 149.9 cm (59\")   Wt 75.8 kg (167 lb)   SpO2 97%   BMI 33.73 kg/m²         Requested Prescriptions     Signed Prescriptions Disp Refills    methylPREDNISolone (MEDROL) 4 MG dose pack 21 tablet 0     Sig: Take as directed on package instructions.       Routine medications provided by this office will also be refilled via pharmacy request.       Current Outpatient Medications:     albuterol sulfate  (90 Base) MCG/ACT inhaler, Inhale 2 puffs Every 4 (Four) Hours As Needed for Wheezing or Shortness of Air., Disp: 6.7 g, Rfl: 1    budesonide (Pulmicort Flexhaler) 90 MCG/ACT inhaler, Inhale 1 puff 2 (Two) Times a Day. Rinse and spit after use, Disp: 1 each, Rfl: 1    Fluticasone-Umeclidin-Vilant (Trelegy Ellipta) 100-62.5-25 MCG/ACT inhaler, Inhale 1 puff Daily. Rinse and spit after use, Disp: 1 each, Rfl: 0    lisinopril (PRINIVIL,ZESTRIL) 10 MG tablet, Take 1 tablet by mouth once daily, Disp: 90 tablet, Rfl: 0    meloxicam (MOBIC) 15 MG tablet, Take 1 tablet by mouth Daily. Take with food, Disp: 30 tablet, Rfl: 0    methocarbamol (ROBAXIN) 500 MG tablet, Take 1 tablet by mouth 4 (Four) Times a Day., Disp: 30 tablet, Rfl: 0    montelukast (SINGULAIR) 10 MG tablet, Take 1 tablet by mouth every night at bedtime., Disp: 90 tablet, Rfl: 1    venlafaxine (EFFEXOR) 37.5 MG tablet, Take 1 tablet by mouth twice daily, Disp: 180 tablet, Rfl: 0    methylPREDNISolone (MEDROL) 4 MG dose pack, Take as directed on package instructions., Disp: 21 tablet, Rfl: 0     Assessment and Plan:      Assessment and Plan {CC Problem List  Visit Diagnosis  ROS  Review (Popup)  Health Maintenance  Quality  BestPractice  Medications  SmartSets  SnapShot Encounters  Media :23}     Problem List Items Addressed This Visit    None  Visit Diagnoses       Chills    -  Primary    Relevant Medications    methylPREDNISolone (MEDROL) 4 MG dose pack    " Other Relevant Orders    POCT SARS-CoV-2 Antigen MARIA ALEJANDRA + Flu (Completed)    Acute nonintractable headache, unspecified headache type        Relevant Orders    POCT SARS-CoV-2 Antigen MARIA ALEJANDRA + Flu (Completed)    Congestion of both ears        Relevant Medications    methylPREDNISolone (MEDROL) 4 MG dose pack    Other Relevant Orders    POCT SARS-CoV-2 Antigen MARIA ALEJANDRA + Flu (Completed)    Upper respiratory tract infection, unspecified type        Relevant Medications    methylPREDNISolone (MEDROL) 4 MG dose pack          Educated patient that COVID and flu were negative.  Educated patient on dosing and side effects of Medrol Dosepak.  Gave patient samples of Mucinex.  Educated patient to rest, increase fluid intake, use Tylenol for chills and body aches and take medication as directed.  Patient verbalized understanding and is comfortable with the plan of care.    Follow Up {Instructions Charge Capture  Follow-up Communications :23}     No follow-ups on file.      Patient was given instructions and counseling regarding her condition or for health maintenance advice. Please see specific information pulled into the AVS if appropriate.    Dragon disclaimer:   Much of this encounter note is an electronic transcription/translation of spoken language to printed text. The electronic translation of spoken language may permit erroneous, or at times, nonsensical words or phrases to be inadvertently transcribed; Although I have reviewed the note for such errors, some may still exist.     Additional Patient Counseling:       There are no Patient Instructions on file for this visit.

## 2023-10-04 NOTE — LETTER
October 4, 2023     Patient: Paula Trevino   YOB: 1960   Date of Visit: 10/4/2023       To Whom It May Concern:    It is my medical opinion that Paula Trevino may return to work in two days.         Sincerely,        JOSE Jarquin    CC: No Recipients

## 2023-10-04 NOTE — LETTER
October 4, 2023     Patient: Paula Trevino   YOB: 1960   Date of Visit: 10/4/2023       To Whom it May Concern:    Paula Trevino was seen in my clinic on 10/4/2023. She may return to work in two days. Please excuse her absences starting from 10/2/2023 to 10/6/2023.          Sincerely,          JOSE Jarquin        CC: No Recipients

## 2023-10-04 NOTE — Clinical Note
October 4, 2023     Patient: Paula Trevino   YOB: 1960   Date of Visit: 10/4/2023       To Whom It May Concern:    It is my medical opinion that Paula Trevino may return to work in three days.            Sincerely,        JOSE Jarquin    CC: No Recipients

## 2023-10-10 DIAGNOSIS — I10 ESSENTIAL HYPERTENSION: ICD-10-CM

## 2023-10-10 RX ORDER — LISINOPRIL 10 MG/1
TABLET ORAL
Qty: 90 TABLET | Refills: 0 | Status: SHIPPED | OUTPATIENT
Start: 2023-10-10

## 2023-11-02 ENCOUNTER — NURSE TRIAGE (OUTPATIENT)
Dept: CALL CENTER | Facility: HOSPITAL | Age: 63
End: 2023-11-02
Payer: COMMERCIAL

## 2023-11-03 ENCOUNTER — TELEPHONE (OUTPATIENT)
Dept: INTERNAL MEDICINE | Facility: CLINIC | Age: 63
End: 2023-11-03
Payer: COMMERCIAL

## 2023-11-03 NOTE — TELEPHONE ENCOUNTER
Caller reported had steroid shots and having facial flushing, thinks when happened before was told to take benadryl, wanting to know if would help. Spoke with [facility] pharmacist, advised that benadryl would help, if has pepcid 20mg, then that taken with benadryl 25mg would also help.  Informed caller of pharmacists recommendations.   Reason for Disposition   [1] Caller has medicine question about med NOT prescribed by PCP AND [2] triager unable to answer question (e.g., compatibility with other med, storage)    Additional Information   Negative: [1] Intentional drug overdose AND [2] suicidal thoughts or ideas   Negative: Drug overdose and triager unable to answer question   Negative: Caller requesting a renewal or refill of a medicine patient is currently taking   Negative: Caller requesting information unrelated to medicine   Negative: Caller requesting information about COVID-19 Vaccine   Negative: Caller requesting information about Emergency Contraception   Negative: Caller requesting information about Combined Birth Control Pills   Negative: Caller requesting information about Progestin Birth Control Pills   Negative: Caller requesting information about Post-Op pain or medicines   Negative: Caller requesting a prescription antibiotic (such as Penicillin) for Strep throat and has a positive culture result   Negative: Caller requesting a prescription anti-viral med (such as Tamiflu) and has influenza (flu) symptoms   Negative: Immunization reaction suspected   Negative: Rash while taking a medicine or within 3 days of stopping it   Negative: [1] Asthma and [2] having symptoms of asthma (cough, wheezing, etc.)   Negative: [1] Symptom of illness (e.g., headache, abdominal pain, earache, vomiting) AND [2] more than mild   Negative: Breastfeeding questions about mother's medicines and diet   Negative: MORE THAN A DOUBLE DOSE of a prescription or over-the-counter (OTC) drug   Negative: [1] DOUBLE DOSE (an extra dose or  "lesser amount) of prescription drug AND [2] any symptoms (e.g., dizziness, nausea, pain, sleepiness)   Negative: [1] DOUBLE DOSE (an extra dose or lesser amount) of over-the-counter (OTC) drug AND [2] any symptoms (e.g., dizziness, nausea, pain, sleepiness)   Negative: Took another person's prescription drug   Negative: [1] DOUBLE DOSE (an extra dose or lesser amount) of prescription drug AND [2] NO symptoms  (Exception: A double dose of antibiotics.)   Negative: Diabetes drug error or overdose (e.g., took wrong type of insulin or took extra dose)   Negative: [1] Prescription not at pharmacy AND [2] was prescribed by PCP recently (Exception: Triager has access to EMR and prescription is recorded there. Go to Home Care and confirm for pharmacy.)   Negative: [1] Pharmacy calling with prescription question AND [2] triager unable to answer question   Negative: [1] Caller has URGENT medicine question about med that PCP or specialist prescribed AND [2] triager unable to answer question   Negative: Medicine patch causing local rash or itching    Answer Assessment - Initial Assessment Questions  1. NAME of MEDICINE: \"What medicine(s) are you calling about?\"      benadryl  2. QUESTION: \"What is your question?\" (e.g., double dose of medicine, side effect)      Wanting to know if benadryl will help with facial flushing from steroid shot  3. PRESCRIBER: \"Who prescribed the medicine?\" Reason: if prescribed by specialist, call should be referred to that group.      OTC  4. SYMPTOMS: \"Do you have any symptoms?\" If Yes, ask: \"What symptoms are you having?\"  \"How bad are the symptoms (e.g., mild, moderate, severe)      Facial flushing  5. PREGNANCY:  \"Is there any chance that you are pregnant?\" \"When was your last menstrual period?\"      N/a    Protocols used: Medication Question Call-ADULT-AH    "

## 2023-11-03 NOTE — TELEPHONE ENCOUNTER
Spoke with pt about her reaction to the steroid shot pt had flushing of the face pt states she took a benadryl and she is better now

## 2023-11-27 ENCOUNTER — OFFICE VISIT (OUTPATIENT)
Dept: INTERNAL MEDICINE | Facility: CLINIC | Age: 63
End: 2023-11-27
Payer: COMMERCIAL

## 2023-11-27 VITALS — WEIGHT: 169.8 LBS | HEIGHT: 59 IN | BODY MASS INDEX: 34.23 KG/M2

## 2023-11-27 DIAGNOSIS — J45.20 MILD INTERMITTENT ASTHMA WITHOUT COMPLICATION: Chronic | ICD-10-CM

## 2023-11-27 DIAGNOSIS — J06.9 VIRAL URI: ICD-10-CM

## 2023-11-27 DIAGNOSIS — U07.1 COVID-19: Primary | ICD-10-CM

## 2023-11-27 LAB
EXPIRATION DATE: ABNORMAL
FLUAV RNA RESP QL NAA+PROBE: NOT DETECTED
FLUBV RNA RESP QL NAA+PROBE: NOT DETECTED
INTERNAL CONTROL: ABNORMAL
Lab: ABNORMAL
SARS-COV-2 RNA RESP QL NAA+PROBE: DETECTED

## 2023-11-27 RX ORDER — GUAIFENESIN 600 MG/1
600 TABLET, EXTENDED RELEASE ORAL EVERY 12 HOURS SCHEDULED
Start: 2023-11-27 | End: 2023-12-04

## 2023-11-27 RX ORDER — MONTELUKAST SODIUM 10 MG/1
10 TABLET ORAL
Qty: 90 TABLET | Refills: 1 | Status: SHIPPED | OUTPATIENT
Start: 2023-11-27

## 2023-11-27 RX ORDER — PREDNISONE 10 MG/1
TABLET ORAL
Qty: 36 TABLET | Refills: 0 | Status: SHIPPED | OUTPATIENT
Start: 2023-11-27 | End: 2023-12-13

## 2023-11-27 NOTE — PROGRESS NOTES
"Chief Complaint  No chief complaint on file.    Subjective        Paula Trevino presents to [facility] due to nasal congestion and generalized malaise.    History of Present Illness    She presents due to increased congestion and drainage since Sunday with increased malaise and body aches. She denies fever but c/o chills throughout the day. Denies dyspnea. She has a hx of asthma, denies wheezing. She has not yet taken anything for the drainage.    Objective   Vital Signs:  Ht 149.9 cm (59\")   Wt 77 kg (169 lb 12.8 oz)   BMI 34.30 kg/m²   Estimated body mass index is 34.3 kg/m² as calculated from the following:    Height as of this encounter: 149.9 cm (59\").    Weight as of this encounter: 77 kg (169 lb 12.8 oz).               Physical Exam  HENT:      Head: Normocephalic.      Right Ear: External ear normal. Tympanic membrane is bulging.      Left Ear: External ear normal. Tympanic membrane is bulging.      Nose: Nose normal.      Mouth/Throat:      Pharynx: Posterior oropharyngeal erythema present.   Eyes:      General:         Right eye: No discharge.         Left eye: No discharge.      Conjunctiva/sclera: Conjunctivae normal.   Cardiovascular:      Pulses: Normal pulses.      Heart sounds: Normal heart sounds. No murmur heard.  Pulmonary:      Effort: No respiratory distress.      Breath sounds: Normal breath sounds. No wheezing, rhonchi or rales.   Musculoskeletal:      Cervical back: Normal range of motion.   Lymphadenopathy:      Cervical: No cervical adenopathy.   Skin:     General: Skin is warm and dry.   Neurological:      Mental Status: She is alert.        Result Review :                   Assessment and Plan   Diagnoses and all orders for this visit:    1. COVID-19 (Primary)  -     guaiFENesin (Mucinex) 600 MG 12 hr tablet; Take 1 tablet by mouth Every 12 (Twelve) Hours for 7 days.  -     predniSONE (DELTASONE) 10 MG tablet; 1 tab qidx 3d then 1 tab tid x3d then 1 tab " bid x5d then 1 tab qd x5d  Dispense: 36 tablet; Refill: 0    2. Viral URI  -     Covid-19 + Flu A&B AG, Veritor    3. Mild intermittent asthma without complication  -     montelukast (SINGULAIR) 10 MG tablet; Take 1 tablet by mouth every night at bedtime.  Dispense: 90 tablet; Refill: 1    Her swab for COVID-19 was positive, negative for influenza A & B. She will start Mucinex for increased congestion and drainage but also treat chest tightness (hx asthma) with tapering prednisone. She is given a work note, discussed importance of quarantine for 5-10 days (depending on symptoms). RTC if sx persist and/or worsen.         Follow Up   Return if symptoms worsen or fail to improve, for Next scheduled follow up.  Patient was given instructions and counseling regarding her condition or for health maintenance advice. Please see specific information pulled into the AVS if appropriate.

## 2023-12-19 ENCOUNTER — OFFICE VISIT (OUTPATIENT)
Dept: INTERNAL MEDICINE | Facility: CLINIC | Age: 63
End: 2023-12-19
Payer: COMMERCIAL

## 2023-12-19 VITALS
HEIGHT: 59 IN | DIASTOLIC BLOOD PRESSURE: 70 MMHG | WEIGHT: 167.4 LBS | TEMPERATURE: 98.3 F | BODY MASS INDEX: 33.75 KG/M2 | HEART RATE: 98 BPM | SYSTOLIC BLOOD PRESSURE: 112 MMHG | OXYGEN SATURATION: 96 %

## 2023-12-19 DIAGNOSIS — J20.9 ACUTE BRONCHITIS, UNSPECIFIED ORGANISM: Primary | ICD-10-CM

## 2023-12-19 DIAGNOSIS — J45.20 MILD INTERMITTENT ASTHMA WITHOUT COMPLICATION: ICD-10-CM

## 2023-12-19 DIAGNOSIS — R68.89 SUSPECTED VIRAL INFECTION: ICD-10-CM

## 2023-12-19 LAB
EXPIRATION DATE: NORMAL
FLUAV RNA RESP QL NAA+PROBE: NOT DETECTED
FLUBV RNA RESP QL NAA+PROBE: NOT DETECTED
INTERNAL CONTROL: NORMAL
Lab: NORMAL
SARS-COV-2 RNA RESP QL NAA+PROBE: NOT DETECTED

## 2023-12-19 RX ORDER — BUDESONIDE 90 UG/1
1 AEROSOL, POWDER RESPIRATORY (INHALATION)
Qty: 1 EACH | Refills: 1 | Status: SHIPPED | OUTPATIENT
Start: 2023-12-19

## 2023-12-19 RX ORDER — BENZONATATE 200 MG/1
200 CAPSULE ORAL 3 TIMES DAILY PRN
Qty: 30 CAPSULE | Refills: 0 | Status: SHIPPED | OUTPATIENT
Start: 2023-12-19

## 2023-12-21 RX ORDER — DEXTROMETHORPHAN HYDROBROMIDE AND PROMETHAZINE HYDROCHLORIDE 15; 6.25 MG/5ML; MG/5ML
5 SYRUP ORAL 4 TIMES DAILY PRN
Qty: 118 ML | Refills: 0 | Status: SHIPPED | OUTPATIENT
Start: 2023-12-21

## 2023-12-22 DIAGNOSIS — F33.1 MODERATE EPISODE OF RECURRENT MAJOR DEPRESSIVE DISORDER: ICD-10-CM

## 2023-12-22 RX ORDER — VENLAFAXINE 37.5 MG/1
37.5 TABLET ORAL 2 TIMES DAILY
Qty: 180 TABLET | Refills: 0 | Status: SHIPPED | OUTPATIENT
Start: 2023-12-22

## 2023-12-23 NOTE — PROGRESS NOTES
"Chief Complaint  Headache, Cough, Nausea, Diarrhea, Back Pain, and Fatigue    Subjective        Paula Trevino presents to [facility] who presents due to respiratory symptoms.    History of Presenting Illness    She tested positive for COVID-19 November 27 with lingering drainage. She was unable to get her inhaler filled at the pharmacy due to lack of coverage. She presents due to increased sinus pressure with drainage over the past several days. She also c/o worsening chest tightness with a loose cough over the past week. Denies dyspnea. She notes intermittent wheezing. Denies fever and/or chills but does c/o muscle aches.     Objective   Vital Signs:  /70 (BP Location: Right arm, Patient Position: Sitting, Cuff Size: Adult)   Pulse 98   Temp 98.3 °F (36.8 °C)   Ht 149.9 cm (59\")   Wt 75.9 kg (167 lb 6.4 oz)   SpO2 96%   BMI 33.81 kg/m²   Estimated body mass index is 33.81 kg/m² as calculated from the following:    Height as of this encounter: 149.9 cm (59\").    Weight as of this encounter: 75.9 kg (167 lb 6.4 oz).               Physical Exam  HENT:      Head: Normocephalic.      Right Ear: External ear normal. Tympanic membrane is bulging.      Left Ear: External ear normal. Tympanic membrane is bulging.      Nose: Nose normal.      Mouth/Throat:      Pharynx: Posterior oropharyngeal erythema present.   Eyes:      General:         Right eye: No discharge.         Left eye: No discharge.      Conjunctiva/sclera: Conjunctivae normal.   Cardiovascular:      Pulses: Normal pulses.      Heart sounds: Normal heart sounds. No murmur heard.  Pulmonary:      Effort: No respiratory distress.      Breath sounds: Normal breath sounds. No wheezing, rhonchi or rales.   Musculoskeletal:      Cervical back: Normal range of motion.   Lymphadenopathy:      Cervical: No cervical adenopathy.   Skin:     General: Skin is warm and dry.   Neurological:      Mental Status: She is alert.      "   Result Review :                   Assessment and Plan   Diagnoses and all orders for this visit:    1. Acute bronchitis, unspecified organism (Primary)  -     benzonatate (TESSALON) 200 MG capsule; Take 1 capsule by mouth 3 (Three) Times a Day As Needed for Cough.  Dispense: 30 capsule; Refill: 0  -     budesonide (Pulmicort Flexhaler) 90 MCG/ACT inhaler; Inhale 1 puff 2 (Two) Times a Day. Rinse and spit after use  Dispense: 1 each; Refill: 1  -     promethazine-dextromethorphan (PROMETHAZINE-DM) 6.25-15 MG/5ML syrup; Take 5 mL by mouth 4 (Four) Times a Day As Needed for Cough.  Dispense: 118 mL; Refill: 0    2. Suspected viral infection  -     Covid-19 + Flu A&B AG, Veritor    3. Mild intermittent asthma without complication    Her flu swab and repeat COVID test were negative, sx appear more viral in nature. She will is given another Pulmicort inh and is advised to notify the office if she is unable to receive it at the pharmacy. Will treat with an antibiotic due to lingering sx. RTC if sx do not improve.       Follow Up   Return if symptoms worsen or fail to improve, for Next scheduled follow up.  Patient was given instructions and counseling regarding her condition or for health maintenance advice. Please see specific information pulled into the AVS if appropriate.

## 2023-12-27 DIAGNOSIS — J06.9 UPPER RESPIRATORY TRACT INFECTION, UNSPECIFIED TYPE: Primary | ICD-10-CM

## 2023-12-27 RX ORDER — AMOXICILLIN AND CLAVULANATE POTASSIUM 875; 125 MG/1; MG/1
1 TABLET, FILM COATED ORAL EVERY 12 HOURS SCHEDULED
Qty: 14 TABLET | Refills: 0 | Status: SHIPPED | OUTPATIENT
Start: 2023-12-27 | End: 2024-01-03

## 2023-12-29 ENCOUNTER — OFFICE VISIT (OUTPATIENT)
Dept: INTERNAL MEDICINE | Facility: CLINIC | Age: 63
End: 2023-12-29
Payer: COMMERCIAL

## 2023-12-29 VITALS
WEIGHT: 167 LBS | SYSTOLIC BLOOD PRESSURE: 128 MMHG | DIASTOLIC BLOOD PRESSURE: 80 MMHG | HEART RATE: 75 BPM | BODY MASS INDEX: 33.67 KG/M2 | OXYGEN SATURATION: 95 % | RESPIRATION RATE: 18 BRPM | HEIGHT: 59 IN

## 2023-12-29 DIAGNOSIS — Z78.0 POSTMENOPAUSAL: ICD-10-CM

## 2023-12-29 DIAGNOSIS — Z12.31 ENCOUNTER FOR SCREENING MAMMOGRAM FOR MALIGNANT NEOPLASM OF BREAST: ICD-10-CM

## 2023-12-29 DIAGNOSIS — F33.1 MODERATE EPISODE OF RECURRENT MAJOR DEPRESSIVE DISORDER: Chronic | ICD-10-CM

## 2023-12-29 DIAGNOSIS — Z00.00 PE (PHYSICAL EXAM), ANNUAL: Primary | ICD-10-CM

## 2023-12-29 DIAGNOSIS — I10 ESSENTIAL HYPERTENSION: Chronic | ICD-10-CM

## 2023-12-29 NOTE — PROGRESS NOTES
Subjective   Paula Trevino is a 63 y.o. female who is here for a physical exam.    History of Present Illness     The patient is a 63-year-old female who presents today for a physical exam.     She was recently treated for a viral bronchitis. Sx were gradually worsened so Augmentin was prescribed which she started yesterday. She notes mild improvement in cough and congestion which is still present, denies dyspnea.     She continues to c/o low back pain and stiffness which has been recurrent, denies paresthesias of lower extremities. Pain is worse with prolonged standing as required by work.     The following portions of the patient's history were reviewed and updated as appropriate: allergies, current medications, past social history and problem list.    Past Medical History:   Diagnosis Date    Controlled substance agreement signed 2022    Depression     Hyperlipidemia     Infectious mononucleosis     Pregnancy         Seasonal allergies          Current Outpatient Medications:     albuterol sulfate  (90 Base) MCG/ACT inhaler, Inhale 2 puffs Every 4 (Four) Hours As Needed for Wheezing or Shortness of Air., Disp: 6.7 g, Rfl: 1    amoxicillin-clavulanate (AUGMENTIN) 875-125 MG per tablet, Take 1 tablet by mouth Every 12 (Twelve) Hours for 7 days., Disp: 14 tablet, Rfl: 0    benzonatate (TESSALON) 200 MG capsule, Take 1 capsule by mouth 3 (Three) Times a Day As Needed for Cough., Disp: 30 capsule, Rfl: 0    budesonide (Pulmicort Flexhaler) 90 MCG/ACT inhaler, Inhale 1 puff 2 (Two) Times a Day. Rinse and spit after use, Disp: 1 each, Rfl: 1    lisinopril (PRINIVIL,ZESTRIL) 10 MG tablet, Take 1 tablet by mouth once daily, Disp: 90 tablet, Rfl: 0    meloxicam (MOBIC) 15 MG tablet, Take 1 tablet by mouth Daily. Take with food, Disp: 30 tablet, Rfl: 0    methocarbamol (ROBAXIN) 500 MG tablet, Take 1 tablet by mouth 4 (Four) Times a Day., Disp: 30 tablet, Rfl: 0    montelukast (SINGULAIR) 10 MG tablet, Take  1 tablet by mouth every night at bedtime., Disp: 90 tablet, Rfl: 1    promethazine-dextromethorphan (PROMETHAZINE-DM) 6.25-15 MG/5ML syrup, Take 5 mL by mouth 4 (Four) Times a Day As Needed for Cough., Disp: 118 mL, Rfl: 0    venlafaxine (EFFEXOR) 37.5 MG tablet, Take 1 tablet by mouth 2 (Two) Times a Day., Disp: 180 tablet, Rfl: 0    Allergies   Allergen Reactions    Ampicillin Rash    Celecoxib Rash    Doxycycline Rash    Erythromycin Rash    Meperidine Rash    Sulfa Antibiotics Rash       Review of Systems   Constitutional:  Negative for activity change, appetite change, chills, diaphoresis, fatigue, fever and unexpected weight change.   HENT:  Positive for congestion, postnasal drip and rhinorrhea. Negative for dental problem, drooling, ear discharge, ear pain, facial swelling, hearing loss, mouth sores, nosebleeds, sinus pressure, sore throat, tinnitus and trouble swallowing.    Eyes:  Negative for photophobia, pain, discharge, redness, itching and visual disturbance.   Respiratory:  Positive for cough. Negative for apnea, choking, chest tightness, shortness of breath and wheezing.    Cardiovascular:  Negative for chest pain, palpitations and leg swelling.        No orthopnea, PND, MENDOZA   Gastrointestinal:  Negative for abdominal pain, blood in stool, constipation, diarrhea, nausea and vomiting.   Endocrine: Negative for cold intolerance, heat intolerance, polydipsia and polyuria.   Genitourinary:  Negative for decreased urine volume, dysuria, enuresis, flank pain, frequency, hematuria and urgency.   Musculoskeletal:  Positive for arthralgias, back pain, neck pain and neck stiffness. Negative for gait problem, joint swelling and myalgias.   Skin:  Negative for color change and rash.        No hair changes, no nail changes   Allergic/Immunologic: Negative for environmental allergies, food allergies and immunocompromised state.   Neurological:  Negative for dizziness, tremors, seizures, syncope, speech difficulty,  "weakness, light-headedness, numbness and headaches.   Hematological:  Negative for adenopathy. Does not bruise/bleed easily.   Psychiatric/Behavioral:  Negative for agitation, confusion, decreased concentration, dysphoric mood, sleep disturbance and suicidal ideas. The patient is not nervous/anxious.        Objective   Vitals:    12/29/23 1413   BP: 128/80   BP Location: Left arm   Patient Position: Sitting   Cuff Size: Small Adult   Pulse: 75   Resp: 18   SpO2: 95%   Weight: 75.8 kg (167 lb)   Height: 149.9 cm (59\")     Physical Exam  Constitutional:       General: She is not in acute distress.     Appearance: Normal appearance. She is not diaphoretic.   HENT:      Head: Normocephalic and atraumatic.      Right Ear: Tympanic membrane, ear canal and external ear normal. Tympanic membrane is bulging.      Left Ear: Tympanic membrane, ear canal and external ear normal. Tympanic membrane is bulging.      Nose: Nose normal. No rhinorrhea.      Mouth/Throat:      Mouth: Mucous membranes are moist.      Pharynx: Oropharynx is clear. Posterior oropharyngeal erythema present.   Eyes:      General:         Right eye: No discharge.         Left eye: No discharge.      Conjunctiva/sclera: Conjunctivae normal.   Cardiovascular:      Rate and Rhythm: Normal rate and regular rhythm.      Pulses: Normal pulses.      Heart sounds: Normal heart sounds. No murmur heard.  Pulmonary:      Effort: Pulmonary effort is normal. No respiratory distress.      Breath sounds: Normal breath sounds. No wheezing, rhonchi or rales.   Abdominal:      General: Bowel sounds are normal.      Tenderness: There is no abdominal tenderness.   Musculoskeletal:         General: No swelling or tenderness.      Cervical back: Normal range of motion.   Lymphadenopathy:      Cervical: No cervical adenopathy.   Skin:     General: Skin is warm and dry.   Neurological:      General: No focal deficit present.      Mental Status: She is alert and oriented to person, " place, and time.   Psychiatric:         Mood and Affect: Mood normal.         Behavior: Behavior normal.         Judgment: Judgment normal.         Assessment & Plan   Diagnoses and all orders for this visit:    1. PE (physical exam), annual (Primary)  -     CBC (No Diff)  -     Comprehensive Metabolic Panel  -     Lipid Panel  -     TSH  -     Urinalysis With Microscopic If Indicated (No Culture) - Urine, Clean Catch    2. Essential hypertension  Assessment & Plan:  BP is stable on lisinopril which she will continue along with a low sodium diet.      3. Moderate episode of recurrent major depressive disorder  Assessment & Plan:  She has been managed on Effexor XR since 6/2020 which she is tolerating well, continue to monitor.      4. Encounter for screening mammogram for malignant neoplasm of breast  -     Mammo Screening Digital Tomosynthesis Bilateral With CAD; Future    5. Postmenopausal  -     DEXA Bone Density Axial; Future    Her respiratory symptoms are gradually improving, complete course of Augmentin. RTC if sx persist and/or worsen.    Risk assessment:  She has a family hx (sister) of breast cancer (will schedule mammogram) and diabetes (sister)-check labs today.  Her Body mass index is 33.73 kg/m². - She is work on a low-fat, low-cholesterol diet.    Prevention:  Health Maintenance   Topic Date Due    DXA SCAN  Never done    Pneumococcal Vaccine 65+ (1 of 2 - PCV) Never done    ZOSTER VACCINE (1 of 2) Never done    PAP SMEAR  05/17/2022    COVID-19 Vaccine (7 - 2023-24 season) 09/01/2023    ANNUAL PHYSICAL  12/27/2023    LIPID PANEL  12/27/2023    INFLUENZA VACCINE  03/31/2024 (Originally 8/1/2023)    MAMMOGRAM  01/16/2024    BMI FOLLOWUP  06/28/2024    COLORECTAL CANCER SCREENING  09/09/2024    TDAP/TD VACCINES (3 - Td or Tdap) 05/31/2031    HEPATITIS C SCREENING  Completed       Discussed healthy lifestyle choices such as maintaining a balanced diet low in carbohydrates and limiting caffeine and  alcohol intake.  Recommended routine exercise for bone strength and cardiovascular health.         Transcribed from ambient dictation for JOSE Mueller by Araceli Waters.  12/29/23   16:48 EST    Patient or patient representative verbalized consent to the visit recording.  I have personally performed the services described in this document as transcribed by the above individual, and it is both accurate and complete.
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
## 2023-12-30 PROBLEM — M25.512 ACUTE PAIN OF LEFT SHOULDER: Status: RESOLVED | Noted: 2023-06-28 | Resolved: 2023-12-30

## 2023-12-30 LAB
ALBUMIN SERPL-MCNC: 4.3 G/DL (ref 3.9–4.9)
ALBUMIN/GLOB SERPL: 2.2 {RATIO} (ref 1.2–2.2)
ALP SERPL-CCNC: 86 IU/L (ref 44–121)
ALT SERPL-CCNC: 45 IU/L (ref 0–32)
AST SERPL-CCNC: 32 IU/L (ref 0–40)
BILIRUB SERPL-MCNC: 0.7 MG/DL (ref 0–1.2)
BUN SERPL-MCNC: 10 MG/DL (ref 8–27)
BUN/CREAT SERPL: 12 (ref 12–28)
CALCIUM SERPL-MCNC: 9.4 MG/DL (ref 8.7–10.3)
CHLORIDE SERPL-SCNC: 100 MMOL/L (ref 96–106)
CHOLEST SERPL-MCNC: 209 MG/DL (ref 100–199)
CO2 SERPL-SCNC: 23 MMOL/L (ref 20–29)
CREAT SERPL-MCNC: 0.86 MG/DL (ref 0.57–1)
EGFRCR SERPLBLD CKD-EPI 2021: 75 ML/MIN/1.73
ERYTHROCYTE [DISTWIDTH] IN BLOOD BY AUTOMATED COUNT: 13.5 % (ref 11.7–15.4)
GLOBULIN SER CALC-MCNC: 2 G/DL (ref 1.5–4.5)
GLUCOSE SERPL-MCNC: 96 MG/DL (ref 70–99)
GLUCOSE UR QL STRIP: NORMAL
HCT VFR BLD AUTO: 39.7 % (ref 34–46.6)
HDLC SERPL-MCNC: 56 MG/DL
HGB BLD-MCNC: 13.4 G/DL (ref 11.1–15.9)
KETONES UR QL STRIP: NORMAL
LDLC SERPL CALC-MCNC: 131 MG/DL (ref 0–99)
MCH RBC QN AUTO: 29.4 PG (ref 26.6–33)
MCHC RBC AUTO-ENTMCNC: 33.8 G/DL (ref 31.5–35.7)
MCV RBC AUTO: 87 FL (ref 79–97)
PH UR STRIP: NORMAL [PH]
PLATELET # BLD AUTO: 305 X10E3/UL (ref 150–450)
POTASSIUM SERPL-SCNC: 4.9 MMOL/L (ref 3.5–5.2)
PROT SERPL-MCNC: 6.3 G/DL (ref 6–8.5)
PROT UR QL STRIP: NORMAL
RBC # BLD AUTO: 4.56 X10E6/UL (ref 3.77–5.28)
SODIUM SERPL-SCNC: 138 MMOL/L (ref 134–144)
SP GR UR STRIP: NORMAL
SPECIMEN STATUS: NORMAL
TRIGL SERPL-MCNC: 122 MG/DL (ref 0–149)
TSH SERPL DL<=0.005 MIU/L-ACNC: 1.45 UIU/ML (ref 0.45–4.5)
VLDLC SERPL CALC-MCNC: 22 MG/DL (ref 5–40)
WBC # BLD AUTO: 5.8 X10E3/UL (ref 3.4–10.8)

## 2023-12-30 NOTE — ASSESSMENT & PLAN NOTE
She has been managed on Effexor XR since 6/2020 which she is tolerating well, continue to monitor.

## 2024-01-08 DIAGNOSIS — I10 ESSENTIAL HYPERTENSION: ICD-10-CM

## 2024-01-08 RX ORDER — LISINOPRIL 10 MG/1
TABLET ORAL
Qty: 90 TABLET | Refills: 1 | Status: SHIPPED | OUTPATIENT
Start: 2024-01-08

## 2024-04-23 ENCOUNTER — HOSPITAL ENCOUNTER (OUTPATIENT)
Dept: MAMMOGRAPHY | Facility: HOSPITAL | Age: 64
Discharge: HOME | End: 2024-04-23
Payer: MEDICARE

## 2024-04-23 ENCOUNTER — HOSPITAL ENCOUNTER (OUTPATIENT)
Dept: BONE DENSITY | Facility: HOSPITAL | Age: 64
Discharge: HOME | End: 2024-04-23
Payer: MEDICARE

## 2024-04-23 DIAGNOSIS — Z78.0 POSTMENOPAUSAL: ICD-10-CM

## 2024-04-23 DIAGNOSIS — Z12.31 ENCOUNTER FOR SCREENING MAMMOGRAM FOR MALIGNANT NEOPLASM OF BREAST: ICD-10-CM

## 2024-04-23 PROCEDURE — 77063 BREAST TOMOSYNTHESIS BI: CPT

## 2024-04-23 PROCEDURE — 77067 SCR MAMMO BI INCL CAD: CPT

## 2024-04-23 PROCEDURE — 77080 DXA BONE DENSITY AXIAL: CPT

## 2024-04-29 ENCOUNTER — OFFICE VISIT (OUTPATIENT)
Dept: INTERNAL MEDICINE | Facility: CLINIC | Age: 64
End: 2024-04-29
Payer: MEDICARE

## 2024-04-29 VITALS
OXYGEN SATURATION: 98 % | RESPIRATION RATE: 18 BRPM | BODY MASS INDEX: 33.26 KG/M2 | HEART RATE: 81 BPM | SYSTOLIC BLOOD PRESSURE: 122 MMHG | WEIGHT: 165 LBS | HEIGHT: 59 IN | DIASTOLIC BLOOD PRESSURE: 70 MMHG

## 2024-04-29 DIAGNOSIS — I10 ESSENTIAL HYPERTENSION: Primary | Chronic | ICD-10-CM

## 2024-04-29 DIAGNOSIS — M85.852 OSTEOPENIA OF BOTH HIPS: ICD-10-CM

## 2024-04-29 DIAGNOSIS — M17.0 PRIMARY OSTEOARTHRITIS OF BOTH KNEES: Chronic | ICD-10-CM

## 2024-04-29 DIAGNOSIS — M85.851 OSTEOPENIA OF BOTH HIPS: ICD-10-CM

## 2024-04-29 DIAGNOSIS — J45.20 MILD INTERMITTENT ASTHMA WITHOUT COMPLICATION: Chronic | ICD-10-CM

## 2024-04-29 DIAGNOSIS — Z59.9 FINANCIAL DIFFICULTIES: ICD-10-CM

## 2024-04-29 DIAGNOSIS — E78.2 MIXED HYPERLIPIDEMIA: Chronic | ICD-10-CM

## 2024-04-29 PROCEDURE — 3074F SYST BP LT 130 MM HG: CPT | Performed by: NURSE PRACTITIONER

## 2024-04-29 PROCEDURE — 1160F RVW MEDS BY RX/DR IN RCRD: CPT | Performed by: NURSE PRACTITIONER

## 2024-04-29 PROCEDURE — 3078F DIAST BP <80 MM HG: CPT | Performed by: NURSE PRACTITIONER

## 2024-04-29 PROCEDURE — 1126F AMNT PAIN NOTED NONE PRSNT: CPT | Performed by: NURSE PRACTITIONER

## 2024-04-29 PROCEDURE — 99214 OFFICE O/P EST MOD 30 MIN: CPT | Performed by: NURSE PRACTITIONER

## 2024-04-29 PROCEDURE — 1159F MED LIST DOCD IN RCRD: CPT | Performed by: NURSE PRACTITIONER

## 2024-04-29 RX ORDER — CALCIUM CARBONATE/VITAMIN D3 600 MG-10
1 TABLET ORAL 2 TIMES DAILY WITH MEALS
Start: 2024-04-29

## 2024-04-29 SDOH — ECONOMIC STABILITY - INCOME SECURITY: PROBLEM RELATED TO HOUSING AND ECONOMIC CIRCUMSTANCES, UNSPECIFIED: Z59.9

## 2024-05-12 PROBLEM — M85.851 OSTEOPENIA OF BOTH HIPS: Chronic | Status: ACTIVE | Noted: 2024-05-12

## 2024-05-12 PROBLEM — M17.0 PRIMARY OSTEOARTHRITIS OF BOTH KNEES: Status: ACTIVE | Noted: 2023-06-28

## 2024-05-12 PROBLEM — M85.852 OSTEOPENIA OF BOTH HIPS: Status: ACTIVE | Noted: 2024-05-12

## 2024-05-12 PROBLEM — M85.851 OSTEOPENIA OF BOTH HIPS: Status: ACTIVE | Noted: 2024-05-12

## 2024-05-12 PROBLEM — M85.852 OSTEOPENIA OF BOTH HIPS: Chronic | Status: ACTIVE | Noted: 2024-05-12

## 2024-05-12 PROBLEM — M54.40 CHRONIC LEFT-SIDED LOW BACK PAIN WITH SCIATICA: Chronic | Status: ACTIVE | Noted: 2021-02-16

## 2024-05-12 PROBLEM — M17.0 PRIMARY OSTEOARTHRITIS OF BOTH KNEES: Chronic | Status: ACTIVE | Noted: 2023-06-28

## 2024-05-12 PROBLEM — M17.12 PRIMARY OSTEOARTHRITIS OF LEFT KNEE: Chronic | Status: RESOLVED | Noted: 2023-06-28 | Resolved: 2024-05-12

## 2024-05-13 ENCOUNTER — PATIENT OUTREACH (OUTPATIENT)
Age: 64
End: 2024-05-13
Payer: MEDICARE

## 2024-05-13 ENCOUNTER — REFERRAL TRIAGE (OUTPATIENT)
Age: 64
End: 2024-05-13
Payer: MEDICARE

## 2024-05-13 NOTE — OUTREACH NOTE
MSW received referral from primary care providers office for assistance with community resources. MSW outreach to patient by phone and MSW left message and call back number. MSW will continue to attempt outreach for assistance.     Shadia FIGUEROA -   Ambulatory Case Management    5/13/2024, 11:32 EDT

## 2024-05-23 ENCOUNTER — PATIENT MESSAGE (OUTPATIENT)
Age: 64
End: 2024-05-23
Payer: MEDICARE

## 2024-05-23 ENCOUNTER — PATIENT OUTREACH (OUTPATIENT)
Age: 64
End: 2024-05-23
Payer: MEDICARE

## 2024-05-23 NOTE — OUTREACH NOTE
Social Work Assessment  Questions/Answers      Flowsheet Row Most Recent Value   Referral Source outpatient staff, outpatient clinic, physician   Reason for Consult community resources, financial concerns, medication concerns   Preferred Language English   Advance Care Planning Reviewed no concerns identified   People in Home alone   Current Living Arrangements home   Potentially Unsafe Housing Conditions none   In the past 12 months has the electric, gas, oil, or water company threatened to shut off services in your home? No   Primary Care Provided by self   Source of Income unable to assess   Application for Public Assistance pending public assistance pending number   Usual Activity Tolerance good   Current Activity Tolerance good   Major Change/Loss/Stressor financial   Sources of Support community support   Current Discharge Risk financial support inadequate          SDOH updated and reviewed with the patient during this program:  --     Employment: Not At Risk (5/23/2024)    Employment     Do you want help finding or keeping work or a job?: I do not need or want help      Financial Resource Strain: High Risk (5/23/2024)    Overall Financial Resource Strain (CARDIA)     Difficulty of Paying Living Expenses: Hard      --     Food Insecurity: Food Insecurity Present (5/23/2024)    Hunger Vital Sign     Worried About Running Out of Food in the Last Year: Sometimes true     Ran Out of Food in the Last Year: Sometimes true      --     Housing Stability: Not At Risk (5/23/2024)    Housing Stability     Current Living Arrangements: home     Potentially Unsafe Housing Conditions: none      --     Transportation Needs: No Transportation Needs (5/23/2024)    PRAPARE - Transportation     Lack of Transportation (Medical): No     Lack of Transportation (Non-Medical): No      --     Utilities: Not At Risk (5/23/2024)    [facility] Utilities     Threatened with loss of utilities: No      Continuing Care   Community & DME   [facility]    [address], [place] [address]    Phone: [phone]    Resource for: Food Insecurity   [facility]    [address], [place] [address]    Phone: [phone]    Resource for: Financial Resource Strain, Food Insecurity, Utilities   [place] [facility]    [address], [place] [address]    Phone: [phone]    Resource for: Food Insecurity   [facility] [place] [facility]    [address], [place] [address]    Phone: [phone]    Resource for: Financial Resource Strain, Utilities     Patient Outreach    MSW unable to reach patient on 4 call attempts with voicemail.     MSW outreach to patient via [employer] message to provide financial and medication resources to patient. Patient provided with contact information for [facility] and [facility] for financial assistance. MSW provided information for Supplemental Nutritional Assistance Program (SNAP) benefits and [facility] [facility] food pantries that are available for patient to utilize if needed. MSW also provided information for [place] Prescription Assistance Program (KPAP) for assistance with cost of medication. MSW provided contact information to patient via [employer] message and patient can call back to MSW as needed for additional assistance.     Shadia FIGUEROA -   Ambulatory Case Management    5/23/2024, 14:24 EDT

## 2024-05-24 ENCOUNTER — PATIENT OUTREACH (OUTPATIENT)
Age: 64
End: 2024-05-24
Payer: MEDICARE

## 2024-05-24 NOTE — OUTREACH NOTE
Patient Outreach    MSW received incoming call from patient regarding community resource needs after receiving [employer] / ?????.?? message from MSW. Patient states to have financial concerns at this time and discussed programs with MSW including [facility] and [facility]. Patient states she has attempted to utilize these agencies a few years ago and her income was too high.  MSW encouraged patient to outreach to agencies to see if this income limits have changed. MSW also discussed the [facility] and will message contact information for this agency to patient via [employer] / ?????.??. Patient and MSW also discussed [place] Prescription Assistance Program (KPAP) to call and discuss programs that are available to assist her with the cost of medications. Patient states she will review messages sent by MSW for community resource information and call back to MSW as needed for additional assistance.    Shadia FIGUEROA -   Ambulatory Case Management    5/24/2024, 14:51 EDT

## 2024-07-02 ENCOUNTER — PATIENT MESSAGE (OUTPATIENT)
Dept: INTERNAL MEDICINE | Facility: CLINIC | Age: 64
End: 2024-07-02
Payer: MEDICARE

## 2024-07-05 ENCOUNTER — OFFICE VISIT (OUTPATIENT)
Dept: INTERNAL MEDICINE | Facility: CLINIC | Age: 64
End: 2024-07-05
Payer: MEDICARE

## 2024-07-05 VITALS
HEART RATE: 61 BPM | DIASTOLIC BLOOD PRESSURE: 82 MMHG | BODY MASS INDEX: 32.78 KG/M2 | HEIGHT: 59 IN | WEIGHT: 162.6 LBS | TEMPERATURE: 97.8 F | SYSTOLIC BLOOD PRESSURE: 120 MMHG | OXYGEN SATURATION: 97 %

## 2024-07-05 DIAGNOSIS — M79.672 PAIN IN BOTH FEET: ICD-10-CM

## 2024-07-05 DIAGNOSIS — J30.89 NON-SEASONAL ALLERGIC RHINITIS, UNSPECIFIED TRIGGER: Chronic | ICD-10-CM

## 2024-07-05 DIAGNOSIS — M79.671 PAIN IN BOTH FEET: ICD-10-CM

## 2024-07-05 DIAGNOSIS — L23.7 ALLERGIC CONTACT DERMATITIS DUE TO PLANTS, EXCEPT FOOD: Primary | ICD-10-CM

## 2024-07-05 PROCEDURE — 3074F SYST BP LT 130 MM HG: CPT | Performed by: NURSE PRACTITIONER

## 2024-07-05 PROCEDURE — 1159F MED LIST DOCD IN RCRD: CPT | Performed by: NURSE PRACTITIONER

## 2024-07-05 PROCEDURE — 1126F AMNT PAIN NOTED NONE PRSNT: CPT | Performed by: NURSE PRACTITIONER

## 2024-07-05 PROCEDURE — 1160F RVW MEDS BY RX/DR IN RCRD: CPT | Performed by: NURSE PRACTITIONER

## 2024-07-05 PROCEDURE — G2211 COMPLEX E/M VISIT ADD ON: HCPCS | Performed by: NURSE PRACTITIONER

## 2024-07-05 PROCEDURE — 99214 OFFICE O/P EST MOD 30 MIN: CPT | Performed by: NURSE PRACTITIONER

## 2024-07-05 PROCEDURE — 3079F DIAST BP 80-89 MM HG: CPT | Performed by: NURSE PRACTITIONER

## 2024-07-05 RX ORDER — TRIAMCINOLONE ACETONIDE 5 MG/G
1 CREAM TOPICAL 2 TIMES DAILY
Qty: 30 G | Refills: 0 | Status: SHIPPED | OUTPATIENT
Start: 2024-07-05

## 2024-07-05 RX ORDER — METHYLPREDNISOLONE 4 MG/1
TABLET ORAL
Qty: 21 TABLET | Refills: 0 | Status: SHIPPED | OUTPATIENT
Start: 2024-07-05 | End: 2024-07-11

## 2024-07-06 PROBLEM — M79.672 PAIN IN BOTH FEET: Status: ACTIVE | Noted: 2024-07-06

## 2024-07-06 PROBLEM — M79.671 PAIN IN BOTH FEET: Status: ACTIVE | Noted: 2024-07-06

## 2024-07-06 NOTE — PROGRESS NOTES
"Chief Complaint  Poison Ivy (Arms and legs), Leg Swelling, and Dizziness (At random times)  Subjective        Paula Trevino presents to [facility]  Poison Ivy    Leg Swelling    Dizziness      History of Present Illness  The patient presents for evaluation of multiple medical concerns.    She presents due to a rash on her lower legs and arms which she first noticed approximately one week ago. The rash is not associated with pain, but rather itches significantly. She recalls an incident last week where she attempted to clear a fence between two fences, which she believes may have triggered the rash. She has a history of annual exposure to poison ivy. Additionally, she mentions that her legs appear hard and swollen.    The patient also reports experiencing dizziness which is intermittent and worse with changing positions. She does have a history of allergies.    The patient reports experiencing severe dental pain. She consulted a dentist on Monday and was advised to undergo two root canals. She was prescribed amoxicillin, which she will  today. She has not yet scheduled the procedure due to the cost.    The patient also reports experiencing severe foot pain.    Objective   Vital Signs:  /82 (BP Location: Left arm, Patient Position: Sitting, Cuff Size: Adult)   Pulse 61   Temp 97.8 °F (36.6 °C)   Ht 149.9 cm (59\")   Wt 73.8 kg (162 lb 9.6 oz)   SpO2 97%   BMI 32.84 kg/m²   Estimated body mass index is 32.84 kg/m² as calculated from the following:    Height as of this encounter: 149.9 cm (59\").    Weight as of this encounter: 73.8 kg (162 lb 9.6 oz).    BMI is >= 30 and <35. (Class 1 Obesity). The following options were offered after discussion;: exercise counseling/recommendations and nutrition counseling/recommendations      Physical Exam  Constitutional:       Appearance: She is well-developed. She is not ill-appearing.   HENT:      Head: Normocephalic.      Right " Ear: Hearing, tympanic membrane and external ear normal.      Left Ear: Hearing, tympanic membrane and external ear normal.      Nose: Nose normal. No nasal deformity, mucosal edema or rhinorrhea.      Right Sinus: No maxillary sinus tenderness or frontal sinus tenderness.      Left Sinus: No maxillary sinus tenderness or frontal sinus tenderness.      Mouth/Throat:      Dentition: Normal dentition.   Eyes:      General: Lids are normal.         Right eye: No discharge.         Left eye: No discharge.      Conjunctiva/sclera: Conjunctivae normal.      Right eye: No exudate.     Left eye: No exudate.  Neck:      Thyroid: No thyroid mass or thyromegaly.      Vascular: No carotid bruit.      Trachea: Trachea normal.   Cardiovascular:      Rate and Rhythm: Regular rhythm.      Pulses: Normal pulses.      Heart sounds: Normal heart sounds. No murmur heard.  Pulmonary:      Effort: No respiratory distress.      Breath sounds: Normal breath sounds. No decreased breath sounds, wheezing, rhonchi or rales.   Abdominal:      General: Bowel sounds are normal.      Palpations: Abdomen is soft.      Tenderness: There is no abdominal tenderness.   Musculoskeletal:      Cervical back: Normal range of motion. No edema.   Lymphadenopathy:      Head:      Right side of head: No submental, submandibular, tonsillar, preauricular, posterior auricular or occipital adenopathy.      Left side of head: No submental, submandibular, tonsillar, preauricular, posterior auricular or occipital adenopathy.   Skin:     General: Skin is warm and dry.      Findings: Rash present.      Nails: There is no clubbing.      Comments: Scattered maculopapular rash on bilateral arms and lower legs   Neurological:      Mental Status: She is alert.   Psychiatric:         Behavior: Behavior is cooperative.        Physical Exam      Result Review :  The following data was reviewed by: JOSE Mueller on 07/05/2024:  Common labs          12/29/2023    15:01    Common Labs   Glucose 96    BUN 10    Creatinine 0.86    Sodium 138    Potassium 4.9    Chloride 100    Calcium 9.4    Total Protein 6.3    Albumin 4.3    Total Bilirubin 0.7    Alkaline Phosphatase 86    AST (SGOT) 32    ALT (SGPT) 45    WBC 5.8    Hemoglobin 13.4    Hematocrit 39.7    Platelets 305    Total Cholesterol 209    Triglycerides 122    HDL Cholesterol 56    LDL Cholesterol  131           Results               Assessment and Plan   Diagnoses and all orders for this visit:    1. Allergic contact dermatitis due to plants, except food (Primary)  Comments:  will treat with oral & topical steroids, continue to monitor. She may take Benadryl as needed for itching.  Orders:  -     methylPREDNISolone (Medrol) 4 MG dose pack; follow package directions  Dispense: 21 tablet; Refill: 0  -     triamcinolone (KENALOG) 0.5 % cream; Apply 1 Application topically to the appropriate area as directed 2 (Two) Times a Day.  Dispense: 30 g; Refill: 0    2. Non-seasonal allergic rhinitis, unspecified trigger  Assessment & Plan:  Dizziness is likely due to drainage from inner ear which should improve with Medrol. She is also advised to use Flonase NS daily, continue to monitor.      3. Pain in both feet  Assessment & Plan:  She c/o bilateral foot pain which is worse with prolonged standing (required at job), sx suggestive of arthritis-may use Voltaren gel and take Tylenol as needed for pain, consider further evaluation if sx worsen        Assessment & Plan           Follow Up   Return if symptoms worsen or fail to improve, for Next scheduled follow up.  Patient was given instructions and counseling regarding her condition or for health maintenance advice. Please see specific information pulled into the AVS if appropriate.     Patient or patient representative verbalized consent for the use of Ambient Listening during the visit with  JOSE Mueller for chart documentation. 7/6/2024  06:39 EDT  Answers submitted by the  patient for this visit:  Primary Reason for Visit (Submitted on 7/5/2024)  What is the primary reason for your visit?: Rash

## 2024-07-06 NOTE — ASSESSMENT & PLAN NOTE
Dizziness is likely due to drainage from inner ear which should improve with Medrol. She is also advised to use Flonase NS daily, continue to monitor.

## 2024-07-06 NOTE — ASSESSMENT & PLAN NOTE
She c/o bilateral foot pain which is worse with prolonged standing (required at job), sx suggestive of arthritis-may use Voltaren gel and take Tylenol as needed for pain, consider further evaluation if sx worsen

## 2024-07-07 ENCOUNTER — PATIENT MESSAGE (OUTPATIENT)
Dept: INTERNAL MEDICINE | Facility: CLINIC | Age: 64
End: 2024-07-07
Payer: MEDICARE

## 2024-07-07 DIAGNOSIS — I10 ESSENTIAL HYPERTENSION: ICD-10-CM

## 2024-07-18 RX ORDER — LISINOPRIL 10 MG/1
10 TABLET ORAL DAILY
Qty: 90 TABLET | Refills: 1 | Status: SHIPPED | OUTPATIENT
Start: 2024-07-18

## 2024-07-22 ENCOUNTER — OFFICE VISIT (OUTPATIENT)
Dept: INTERNAL MEDICINE | Facility: CLINIC | Age: 64
End: 2024-07-22
Payer: MEDICARE

## 2024-07-22 ENCOUNTER — PATIENT MESSAGE (OUTPATIENT)
Dept: INTERNAL MEDICINE | Facility: CLINIC | Age: 64
End: 2024-07-22
Payer: MEDICARE

## 2024-07-22 VITALS
WEIGHT: 162.6 LBS | TEMPERATURE: 97.2 F | OXYGEN SATURATION: 97 % | DIASTOLIC BLOOD PRESSURE: 86 MMHG | SYSTOLIC BLOOD PRESSURE: 126 MMHG | HEIGHT: 59 IN | BODY MASS INDEX: 32.78 KG/M2 | HEART RATE: 78 BPM

## 2024-07-22 DIAGNOSIS — N30.00 ACUTE CYSTITIS WITHOUT HEMATURIA: ICD-10-CM

## 2024-07-22 DIAGNOSIS — R30.0 DYSURIA: ICD-10-CM

## 2024-07-22 DIAGNOSIS — J45.21 MILD INTERMITTENT ASTHMA WITH ACUTE EXACERBATION: Primary | ICD-10-CM

## 2024-07-22 LAB
BILIRUB BLD-MCNC: NEGATIVE MG/DL
CLARITY, POC: CLEAR
COLOR UR: ABNORMAL
EXPIRATION DATE: ABNORMAL
GLUCOSE UR STRIP-MCNC: NEGATIVE MG/DL
KETONES UR QL: NEGATIVE
LEUKOCYTE EST, POC: ABNORMAL
Lab: ABNORMAL
NITRITE UR-MCNC: NEGATIVE MG/ML
PH UR: 5.5 [PH] (ref 5–8)
PROT UR STRIP-MCNC: NEGATIVE MG/DL
RBC # UR STRIP: ABNORMAL /UL
SP GR UR: 1.02 (ref 1–1.03)
UROBILINOGEN UR QL: ABNORMAL

## 2024-07-22 PROCEDURE — 1125F AMNT PAIN NOTED PAIN PRSNT: CPT | Performed by: NURSE PRACTITIONER

## 2024-07-22 PROCEDURE — 81003 URINALYSIS AUTO W/O SCOPE: CPT | Performed by: NURSE PRACTITIONER

## 2024-07-22 PROCEDURE — G2211 COMPLEX E/M VISIT ADD ON: HCPCS | Performed by: NURSE PRACTITIONER

## 2024-07-22 PROCEDURE — 1160F RVW MEDS BY RX/DR IN RCRD: CPT | Performed by: NURSE PRACTITIONER

## 2024-07-22 PROCEDURE — 3079F DIAST BP 80-89 MM HG: CPT | Performed by: NURSE PRACTITIONER

## 2024-07-22 PROCEDURE — 1159F MED LIST DOCD IN RCRD: CPT | Performed by: NURSE PRACTITIONER

## 2024-07-22 PROCEDURE — 3074F SYST BP LT 130 MM HG: CPT | Performed by: NURSE PRACTITIONER

## 2024-07-22 PROCEDURE — 99214 OFFICE O/P EST MOD 30 MIN: CPT | Performed by: NURSE PRACTITIONER

## 2024-07-22 RX ORDER — ALBUTEROL SULFATE 90 UG/1
2 AEROSOL, METERED RESPIRATORY (INHALATION) EVERY 4 HOURS PRN
Qty: 6.7 G | Refills: 1 | Status: SHIPPED | OUTPATIENT
Start: 2024-07-22

## 2024-07-22 RX ORDER — NITROFURANTOIN 25; 75 MG/1; MG/1
100 CAPSULE ORAL EVERY 12 HOURS SCHEDULED
Qty: 14 CAPSULE | Refills: 0 | Status: SHIPPED | OUTPATIENT
Start: 2024-07-22 | End: 2024-07-29

## 2024-07-25 LAB
BACTERIA UR CULT: ABNORMAL
BACTERIA UR CULT: ABNORMAL
OTHER ANTIBIOTIC SUSC ISLT: ABNORMAL

## 2024-07-25 NOTE — PROGRESS NOTES
"Chief Complaint  Urinary Tract Infection (Burning, back pain, nausea) and Cough  Subjective        Paula Trevino presents to [facility]  History of Present Illness  History of Present Illness  The patient presents for evaluation of multiple medical concerns.    She presents due to dysuria over the past several days with increased urinary frequency and urgency. Denies nausea and/or vomiting.    The patient reports the resolution of a rash on her legs, however, she continues to experience intermittent itching.    The patient frequently experiences itching in her left arm.    She presents due to increased nasal congestion and drainage over the past week with a loose, productive cough. Denies chest pain and/or shortness of breath. She has taken Mucinex intermittently, unsure if medication has been helpful.    The patient reports experiencing headaches.    The patient reports severe back pain which has been recurrent, denies paresthesias of lower extremities.    Objective   Vital Signs:  /86 (BP Location: Left arm, Patient Position: Sitting, Cuff Size: Adult)   Pulse 78   Temp 97.2 °F (36.2 °C)   Ht 149.9 cm (59\")   Wt 73.8 kg (162 lb 9.6 oz)   SpO2 97%   BMI 32.84 kg/m²   Estimated body mass index is 32.84 kg/m² as calculated from the following:    Height as of this encounter: 149.9 cm (59\").    Weight as of this encounter: 73.8 kg (162 lb 9.6 oz).            Physical Exam  HENT:      Head: Normocephalic.      Right Ear: External ear normal. Tympanic membrane is bulging.      Left Ear: External ear normal. Tympanic membrane is bulging.      Nose: Nose normal.      Mouth/Throat:      Pharynx: Posterior oropharyngeal erythema present.   Eyes:      General:         Right eye: No discharge.         Left eye: No discharge.      Conjunctiva/sclera: Conjunctivae normal.   Cardiovascular:      Pulses: Normal pulses.      Heart sounds: Normal heart sounds. No murmur heard.  Pulmonary: "      Effort: No respiratory distress.      Breath sounds: Wheezing present. No rhonchi or rales.   Musculoskeletal:      Cervical back: Normal range of motion.      Lumbar back: Tenderness present.   Lymphadenopathy:      Cervical: No cervical adenopathy.   Skin:     General: Skin is warm and dry.      Findings: Rash present.      Comments: Erythematous, macular rash on left lower extremity with decreased redness and irritation   Neurological:      Mental Status: She is alert.        Physical Exam      Result Review :  The following data was reviewed by: JOSE Mueller on 07/22/2024:  Common labs          12/29/2023    15:01   Common Labs   Glucose 96    BUN 10    Creatinine 0.86    Sodium 138    Potassium 4.9    Chloride 100    Calcium 9.4    Total Protein 6.3    Albumin 4.3    Total Bilirubin 0.7    Alkaline Phosphatase 86    AST (SGOT) 32    ALT (SGPT) 45    WBC 5.8    Hemoglobin 13.4    Hematocrit 39.7    Platelets 305    Total Cholesterol 209    Triglycerides 122    HDL Cholesterol 56    LDL Cholesterol  131           Results               Assessment and Plan   Diagnoses and all orders for this visit:    1. Mild intermittent asthma with acute exacerbation (Primary)  Assessment & Plan:  She has had difficulty getting inhalers due to the cost, has not been able to fill Asmanex or Flovent. Rx for albuterol refill provided. However, she is also given samples of Breztri since other inhalers have been cost prohibitive and she clearly needs medication.    Orders:  -     albuterol sulfate  (90 Base) MCG/ACT inhaler; Inhale 2 puffs Every 4 (Four) Hours As Needed for Wheezing or Shortness of Air.  Dispense: 6.7 g; Refill: 1  -     Budeson-Glycopyrrol-Formoterol (BREZTRI) 160-9-4.8 MCG/ACT aerosol inhaler; Inhale 2 puffs 2 (Two) Times a Day. Rinse and spit after use  Dispense: 5.6 g; Refill: 0    2. Acute cystitis without hematuria  Comments:  UA +WBCs, bacteria-will start an antibiotic and send urine for  culture  Orders:  -     nitrofurantoin, macrocrystal-monohydrate, (MACROBID) 100 MG capsule; Take 1 capsule by mouth Every 12 (Twelve) Hours for 7 days.  Dispense: 14 capsule; Refill: 0    3. Dysuria  -     POC Urinalysis Dipstick, Automated  -     Urine Culture - Urine, Urine, Clean Catch; Future      Assessment & Plan           Follow Up   Return if symptoms worsen or fail to improve, for Next scheduled follow up.  Patient was given instructions and counseling regarding her condition or for health maintenance advice. Please see specific information pulled into the AVS if appropriate.     Patient or patient representative verbalized consent for the use of Ambient Listening during the visit with  JOSE Mueller for chart documentation. 7/25/2024  09:52 EDT

## 2024-07-25 NOTE — ASSESSMENT & PLAN NOTE
She has had difficulty getting inhalers due to the cost, has not been able to fill Asmanex or Flovent. Rx for albuterol refill provided. However, she is also given samples of Breztri since other inhalers have been cost prohibitive and she clearly needs medication.

## 2024-07-29 NOTE — PROGRESS NOTES
Patient advised via voicemail per  verbal release  RECEIVING UNIT ED HANDOFF REVIEW    ED Nurse Handoff Report was reviewed by: Amira Faria RN on September 9, 2020 at 5:24 PM

## 2024-08-24 ENCOUNTER — DOCUMENTATION (OUTPATIENT)
Dept: INTERNAL MEDICINE | Facility: CLINIC | Age: 64
End: 2024-08-24
Payer: MEDICARE

## 2024-08-24 NOTE — PROGRESS NOTES
Patient notified this on call provider stating difficulty staying awake with recent flexeril prescription. Advised patient to stop taking flexeril but that she could continue her meloxicam. Reports last dose taken at 12 AM last night. Advised patient if the side effect does not improve over the next 1-2 hours, to call 911 or have family member take her to closest ER. Patient verbalized understanding.

## 2024-08-30 ENCOUNTER — OFFICE VISIT (OUTPATIENT)
Dept: INTERNAL MEDICINE | Facility: CLINIC | Age: 64
End: 2024-08-30
Payer: MEDICARE

## 2024-08-30 VITALS
OXYGEN SATURATION: 99 % | DIASTOLIC BLOOD PRESSURE: 78 MMHG | BODY MASS INDEX: 32.13 KG/M2 | HEIGHT: 59 IN | HEART RATE: 79 BPM | SYSTOLIC BLOOD PRESSURE: 122 MMHG | WEIGHT: 159.4 LBS

## 2024-08-30 DIAGNOSIS — M85.852 OSTEOPENIA OF BOTH HIPS: Chronic | ICD-10-CM

## 2024-08-30 DIAGNOSIS — Z12.11 SCREENING FOR COLON CANCER: ICD-10-CM

## 2024-08-30 DIAGNOSIS — Z00.00 MEDICARE ANNUAL WELLNESS VISIT, SUBSEQUENT: Primary | ICD-10-CM

## 2024-08-30 DIAGNOSIS — I10 ESSENTIAL HYPERTENSION: Chronic | ICD-10-CM

## 2024-08-30 DIAGNOSIS — E78.2 MIXED HYPERLIPIDEMIA: Chronic | ICD-10-CM

## 2024-08-30 DIAGNOSIS — M85.851 OSTEOPENIA OF BOTH HIPS: Chronic | ICD-10-CM

## 2024-08-30 DIAGNOSIS — J45.20 MILD INTERMITTENT ASTHMA WITHOUT COMPLICATION: Chronic | ICD-10-CM

## 2024-08-30 NOTE — PROGRESS NOTES
The ABCs of the Annual Wellness Visit  Subsequent Medicare Wellness Visit    Chief Complaint   Patient presents with    Medicare Wellness-subsequent      Subjective    History of Present Illness:  Paula Trevino is a 63 y.o. female who presents for a Subsequent Medicare Wellness Visit.  History of Present Illness  The patient presents for evaluation of multiple medical concerns.    She reports feeling unwell overall, with pain in her right thumb which she attributes to a previous dog bite and discomfort in her right wrist and elbow. She also mentions experiencing headaches on the left side, intermittent dizziness, and lightheadedness, particularly when walking. She does not endorse any teeth grinding at night.     She has been experiencing abdominal pain and an upset stomach, despite maintaining her regular diet. She also reports alternating bouts of constipation and diarrhea.     Her sleep is frequently disturbed and she often wakes up during the night. She believes her health has deteriorated compared to the previous year, citing increased pain and decreased strength as the main reasons. She has recently started taking calcium supplements.    She is having problems with her asthma, has had difficulty getting inhalers through insurance. She does not know if it is because of the weather or because she does not have air conditioning.       The following portions of the patient's history were reviewed and   updated as appropriate: allergies, current medications, past family history, past medical history, past social history, past surgical history, and problem list.    Compared to one year ago, the patient feels her physical   health is worse.    Compared to one year ago, the patient feels her mental   health is the same.    Recent Hospitalizations:  She was not admitted to the hospital during the last year.       Current Medical Providers:  Patient Care Team:  Nitza Mena APRN as PCP - General (Internal  Medicine)  Ronna Romo MD as Consulting Physician (Orthopedic Surgery)    Outpatient Medications Prior to Visit   Medication Sig Dispense Refill    albuterol sulfate  (90 Base) MCG/ACT inhaler Inhale 2 puffs Every 4 (Four) Hours As Needed for Wheezing or Shortness of Air. 6.7 g 1    calcium carb-cholecalciferol (Calcium 600+D) 600-10 MG-MCG tablet per tablet Take 1 tablet by mouth 2 (Two) Times a Day With Meals.      lisinopril (PRINIVIL,ZESTRIL) 10 MG tablet Take 1 tablet by mouth Daily. 90 tablet 1    meloxicam (MOBIC) 15 MG tablet Take 1 tablet by mouth Daily. 15 tablet 0    venlafaxine (EFFEXOR) 37.5 MG tablet Take 1 tablet by mouth 2 (Two) Times a Day. 180 tablet 0    cyclobenzaprine (FLEXERIL) 10 MG tablet Take 1 tablet by mouth 3 (Three) Times a Day As Needed for Muscle Spasms. 15 tablet 0     No facility-administered medications prior to visit.       No opioid medication identified on active medication list. I have reviewed chart for other potential  high risk medication/s and harmful drug interactions in the elderly.        Aspirin is not on active medication list.  Aspirin use is not indicated based on review of current medical condition/s. Risk of harm outweighs potential benefits.  .    Patient Active Problem List   Diagnosis    Non-seasonal allergic rhinitis    Mixed hyperlipidemia    Asthma, mild intermittent    Depression    Essential hypertension    Chronic left-sided low back pain with sciatica    Controlled substance agreement signed    Primary osteoarthritis of both knees    Osteopenia of both hips    Pain in both feet    Cervicalgia     Advance Care Planning  Advance Directive is not on file.  ACP discussion was held with the patient during this visit. Patient has an advance directive (not in EMR), copy requested.    Review of Systems   Constitutional:  Positive for fatigue. Negative for fever.   HENT:  Positive for congestion, postnasal drip and rhinorrhea.    Respiratory:   "Negative for cough, chest tightness and shortness of breath.    Cardiovascular:  Negative for chest pain, palpitations and leg swelling.   Gastrointestinal:  Negative for abdominal pain.   Musculoskeletal:  Positive for arthralgias and back pain.   Psychiatric/Behavioral:  Positive for dysphoric mood.         Objective    Vitals:    08/30/24 1354   BP: 122/78   BP Location: Left arm   Patient Position: Sitting   Cuff Size: Adult   Pulse: 79   SpO2: 99%   Weight: 72.3 kg (159 lb 6.4 oz)   Height: 149.9 cm (59\")   PainSc:   6   PainLoc: Back     Estimated body mass index is 32.19 kg/m² as calculated from the following:    Height as of this encounter: 149.9 cm (59\").    Weight as of this encounter: 72.3 kg (159 lb 6.4 oz).       Gait and Balance Evaluation:  Normal    Does the patient have evidence of cognitive impairment? No    Physical Exam  Constitutional:       Appearance: She is well-developed. She is not ill-appearing.   HENT:      Head: Normocephalic.      Right Ear: Hearing, tympanic membrane and external ear normal.      Left Ear: Hearing, tympanic membrane and external ear normal.      Nose: Nose normal. No nasal deformity, mucosal edema or rhinorrhea.      Right Sinus: No maxillary sinus tenderness or frontal sinus tenderness.      Left Sinus: No maxillary sinus tenderness or frontal sinus tenderness.      Mouth/Throat:      Dentition: Normal dentition.   Eyes:      General: Lids are normal.         Right eye: No discharge.         Left eye: No discharge.      Conjunctiva/sclera: Conjunctivae normal.      Right eye: No exudate.     Left eye: No exudate.  Neck:      Thyroid: No thyroid mass or thyromegaly.      Vascular: No carotid bruit.      Trachea: Trachea normal.   Cardiovascular:      Rate and Rhythm: Regular rhythm.      Pulses: Normal pulses.      Heart sounds: Normal heart sounds. No murmur heard.  Pulmonary:      Effort: No respiratory distress.      Breath sounds: Normal breath sounds. No decreased " breath sounds, wheezing, rhonchi or rales.   Abdominal:      General: Bowel sounds are normal.      Palpations: Abdomen is soft.      Tenderness: There is no abdominal tenderness.   Musculoskeletal:      Cervical back: Normal range of motion. No edema.   Lymphadenopathy:      Head:      Right side of head: No submental, submandibular, tonsillar, preauricular, posterior auricular or occipital adenopathy.      Left side of head: No submental, submandibular, tonsillar, preauricular, posterior auricular or occipital adenopathy.   Skin:     General: Skin is warm and dry.      Nails: There is no clubbing.   Neurological:      Mental Status: She is alert.   Psychiatric:         Behavior: Behavior is cooperative.       Physical Exam  Back of the throat is slightly red with some drainage.    Lab Results   Component Value Date    CHLPL 203 (H) 2024    TRIG 55 2024    HDL 66 (H) 2024     (H) 2024    VLDL 10 2024     Results  Imaging  Bone density showed some osteopenia, no evidence of osteoporosis.           HEALTH RISK ASSESSMENT    Smoking Status:  Social History     Tobacco Use   Smoking Status Never   Smokeless Tobacco Never     Alcohol Consumption:  Social History     Substance and Sexual Activity   Alcohol Use No     Fall Risk Screen:    STEADI Fall Risk Assessment was completed, and patient is at LOW risk for falls.Assessment completed on:2024    Depression Screenin/30/2024     2:31 PM   PHQ-2/PHQ-9 Depression Screening   Little Interest or Pleasure in Doing Things 0-->not at all   Feeling Down, Depressed or Hopeless 1-->several days   PHQ-9: Brief Depression Severity Measure Score 1       Health Habits and Functional and Cognitive Screenin/30/2024     2:32 PM   Functional & Cognitive Status   Do you have difficulty preparing food and eating? No   Do you have difficulty bathing yourself, getting dressed or grooming yourself? No   Do you have difficulty using  the toilet? No   Do you have difficulty moving around from place to place? No   Do you have trouble with steps or getting out of a bed or a chair? No   Current Diet Unhealthy Diet   Dental Exam Not up to date   Eye Exam Not up to date   Exercise (times per week) 2 times per week   Current Exercises Include Walking   Do you need help using the phone?  No   Are you deaf or do you have serious difficulty hearing?  No   Do you need help to go to places out of walking distance? No   Do you need help shopping? No   Do you need help preparing meals?  No   Do you need help with housework?  No   Do you need help with laundry? No   Do you need help taking your medications? No   Do you need help managing money? No   Do you ever drive or ride in a car without wearing a seat belt? No   Have you felt unusual stress, anger or loneliness in the last month? Yes   Who do you live with? Alone   If you need help, do you have trouble finding someone available to you? Yes   Have you been bothered in the last four weeks by sexual problems? No   Do you have difficulty concentrating, remembering or making decisions? Yes       Age-appropriate Screening Schedule:  Refer to the list below for future screening recommendations based on patient's age, sex and/or medical conditions. Orders for these recommended tests are listed in the plan section. The patient has been provided with a written plan.    Health Maintenance   Topic Date Due    ANNUAL WELLNESS VISIT  Never done    PAP SMEAR  05/17/2022    COVID-19 Vaccine (7 - 2023-24 season) 09/01/2024    COLORECTAL CANCER SCREENING  09/09/2024    INFLUENZA VACCINE  03/31/2025 (Originally 8/1/2024)    Pneumococcal Vaccine 65+ (1 of 2 - PCV) 08/30/2025 (Originally [birth year]24/1966)    ZOSTER VACCINE (1 of 2) 08/30/2025 (Originally 12/24/2008)    MAMMOGRAM  04/23/2025    BMI FOLLOWUP  07/05/2025    LIPID PANEL  08/30/2025    DXA SCAN  04/23/2026    TDAP/TD VACCINES (3 - Td or Tdap) 05/31/2031    HEPATITIS C  SCREENING  Completed              Assessment & Plan   CMS Preventative Services Quick Reference  Risk Factors Identified During Encounter  Immunizations Discussed/Encouraged: COVID19  Colon cancer screening  The above risks/problems have been discussed with the patient.  Follow up actions/plans if indicated are seen below in the Assessment/Plan Section.  Pertinent information has been shared with the patient in the After Visit Summary.    Diagnoses and all orders for this visit:    1. Medicare annual wellness visit, subsequent (Primary)    2. Essential hypertension  Assessment & Plan:  BP is well-controlled on lisinopril which she will continue along with a low sodium diet.    Orders:  -     CBC (No Diff)  -     Comprehensive Metabolic Panel  -     TSH    3. Mixed hyperlipidemia  Assessment & Plan:  The 10-year ASCVD risk score (Zak WEBSTER, et al., 2019) is: 7.4%    Values used to calculate the score:      Age: 63 years      Sex: Female      Is Non- : No      Diabetic: No      Tobacco smoker: No      Systolic Blood Pressure: 132 mmHg      Is BP treated: Yes      HDL Cholesterol: 66 mg/dL      Total Cholesterol: 203 mg/dL  Consider statin therapy in the future; continue a low-fat, low-cholesterol diet.    Orders:  -     Lipid Panel    4. Mild intermittent asthma without complication  Assessment & Plan:  She has had difficulty getting inhalers due to the cost, has not been able to fill Asmanex or Flovent. Samples provided, continue to monitor.        5. Osteopenia of both hips  Assessment & Plan:  Continue weightbearing activities, continue daily supplement.    Orders:  -     Vitamin D,25-Hydroxy    6. Screening for colon cancer  -     Cologuard - Stool, Per Rectum; Future      Assessment & Plan    Health Maintenance.  Her Cologuard test is due, with the last one conducted in 09/2021. A new Cologuard test will be ordered for 09/2024. Her most recent mammogram was in 04/2024. Bone density tests  indicate osteopenia but no signs of osteoporosis. Blood work will be conducted today to assess her overall health status, including a vitamin D level check.        Follow Up:   Return in about 6 months (around 2/28/2025).     An After Visit Summary and PPPS were made available to the patient.                 Patient or patient representative verbalized consent for the use of Ambient Listening during the visit with  JOSE Mueller for chart documentation. 9/15/2024  13:29 EDT

## 2024-08-31 LAB
25(OH)D3+25(OH)D2 SERPL-MCNC: 25.3 NG/ML (ref 30–100)
ALBUMIN SERPL-MCNC: 4.3 G/DL (ref 3.5–5.2)
ALBUMIN/GLOB SERPL: 2 G/DL
ALP SERPL-CCNC: 99 U/L (ref 39–117)
ALT SERPL-CCNC: 28 U/L (ref 1–33)
AST SERPL-CCNC: 23 U/L (ref 1–32)
BILIRUB SERPL-MCNC: 1 MG/DL (ref 0–1.2)
BUN SERPL-MCNC: 13 MG/DL (ref 8–23)
BUN/CREAT SERPL: 14.9 (ref 7–25)
CALCIUM SERPL-MCNC: 9.4 MG/DL (ref 8.6–10.5)
CHLORIDE SERPL-SCNC: 100 MMOL/L (ref 98–107)
CHOLEST SERPL-MCNC: 203 MG/DL (ref 0–200)
CO2 SERPL-SCNC: 26.8 MMOL/L (ref 22–29)
CREAT SERPL-MCNC: 0.87 MG/DL (ref 0.57–1)
EGFRCR SERPLBLD CKD-EPI 2021: 74 ML/MIN/1.73
ERYTHROCYTE [DISTWIDTH] IN BLOOD BY AUTOMATED COUNT: 12.9 % (ref 12.3–15.4)
GLOBULIN SER CALC-MCNC: 2.1 GM/DL
GLUCOSE SERPL-MCNC: 84 MG/DL (ref 65–99)
HCT VFR BLD AUTO: 41.5 % (ref 34–46.6)
HDLC SERPL-MCNC: 66 MG/DL (ref 40–60)
HGB BLD-MCNC: 14.1 G/DL (ref 12–15.9)
LDLC SERPL CALC-MCNC: 127 MG/DL (ref 0–100)
MCH RBC QN AUTO: 29.3 PG (ref 26.6–33)
MCHC RBC AUTO-ENTMCNC: 34 G/DL (ref 31.5–35.7)
MCV RBC AUTO: 86.1 FL (ref 79–97)
PLATELET # BLD AUTO: 211 10*3/MM3 (ref 140–450)
POTASSIUM SERPL-SCNC: 4.4 MMOL/L (ref 3.5–5.2)
PROT SERPL-MCNC: 6.4 G/DL (ref 6–8.5)
RBC # BLD AUTO: 4.82 10*6/MM3 (ref 3.77–5.28)
SODIUM SERPL-SCNC: 138 MMOL/L (ref 136–145)
TRIGL SERPL-MCNC: 55 MG/DL (ref 0–150)
TSH SERPL DL<=0.005 MIU/L-ACNC: 1.44 UIU/ML (ref 0.27–4.2)
VLDLC SERPL CALC-MCNC: 10 MG/DL (ref 5–40)
WBC # BLD AUTO: 6.9 10*3/MM3 (ref 3.4–10.8)

## 2024-09-06 ENCOUNTER — OFFICE VISIT (OUTPATIENT)
Dept: INTERNAL MEDICINE | Facility: CLINIC | Age: 64
End: 2024-09-06
Payer: COMMERCIAL

## 2024-09-06 VITALS
HEIGHT: 59 IN | OXYGEN SATURATION: 96 % | WEIGHT: 161.6 LBS | SYSTOLIC BLOOD PRESSURE: 132 MMHG | DIASTOLIC BLOOD PRESSURE: 78 MMHG | BODY MASS INDEX: 32.58 KG/M2 | HEART RATE: 76 BPM
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
## 2024-09-06 DIAGNOSIS — M54.50 ACUTE BILATERAL LOW BACK PAIN WITHOUT SCIATICA: Primary | ICD-10-CM

## 2024-09-06 DIAGNOSIS — M54.2 CERVICALGIA: ICD-10-CM

## 2024-09-06 PROCEDURE — 99214 OFFICE O/P EST MOD 30 MIN: CPT | Performed by: NURSE PRACTITIONER

## 2024-09-07 PROBLEM — M54.2 CERVICALGIA: Status: ACTIVE | Noted: 2024-09-07

## 2024-09-07 NOTE — ASSESSMENT & PLAN NOTE
The patient reports chronic back pain with new pain radiating to the hip. The pain is likely due to inflammation. She was advised to continue taking meloxicam for now but monitor for GI symptoms. No neurological sx noted one exam, RTC if sx persist or worsen.
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
## 2024-09-07 NOTE — ASSESSMENT & PLAN NOTE
Symptoms include stiffness, inability to turn the head fully, and pain radiating to the right shoulder. There is also tingling and numbness in the right arm and thumb, with some weakness in the hands. The pain appears to be more inflammatory. She will continue Meloxicam for inflammation. A TENS unit was recommended for use on her neck. Stretching exercises will be provided at checkout. If she decides to pursue physical therapy (declined today), she should inform the office to make arrangements.

## 2024-09-15 NOTE — ASSESSMENT & PLAN NOTE
The 10-year ASCVD risk score (Zak WEBSTER, et al., 2019) is: 7.4%    Values used to calculate the score:      Age: 63 years      Sex: Female      Is Non- : No      Diabetic: No      Tobacco smoker: No      Systolic Blood Pressure: 132 mmHg      Is BP treated: Yes      HDL Cholesterol: 66 mg/dL      Total Cholesterol: 203 mg/dL  Consider statin therapy in the future; continue a low-fat, low-cholesterol diet.

## 2024-09-15 NOTE — ASSESSMENT & PLAN NOTE
She has had difficulty getting inhalers due to the cost, has not been able to fill Asmanex or Flovent. Samples provided, continue to monitor.

## 2024-09-15 NOTE — PATIENT INSTRUCTIONS
Medicare Wellness  Personal Prevention Plan of Service     Date of Office Visit:    Encounter Provider:  JOSE Mueller  Place of Service:  [facility]  Patient Name: Paula Trevino  :  1960    As part of the Medicare Wellness portion of your visit today, we are providing you with this personalized preventive plan of services (PPPS). This plan is based upon recommendations of the [place] Preventive Services Task Force (USPSTF) and the Advisory Committee on Immunization Practices (ACIP).    This lists the preventive care services that should be considered, and provides dates of when you are due. Items listed as completed are up-to-date and do not require any further intervention.    Health Maintenance   Topic Date Due    ANNUAL WELLNESS VISIT  Never done    PAP SMEAR  2022    COVID-19 Vaccine ( season) 2024    COLORECTAL CANCER SCREENING  2024    INFLUENZA VACCINE  2025 (Originally 2024)    Pneumococcal Vaccine 65+ (1 of 2 - PCV) 2025 (Originally 1964)    ZOSTER VACCINE (1 of 2) 2025 (Originally 2008)    MAMMOGRAM  2025    BMI FOLLOWUP  2025    LIPID PANEL  2025    DXA SCAN  2026    TDAP/TD VACCINES (3 - Td or Tdap) 2031    HEPATITIS C SCREENING  Completed       Orders Placed This Encounter   Procedures    Cologuard - Stool, Per Rectum     Standing Status:   Future     Number of Occurrences:   1     Order Specific Question:   Release to patient     Answer:   Routine Release [[id number]]    CBC (No Diff)     Order Specific Question:   Release to patient     Answer:   Routine Release [[id number]]     Order Specific Question:   [facility] Has the patient fasted?     Answer:   Yes    Comprehensive Metabolic Panel     Order Specific Question:   Release to patient     Answer:   Routine Release [[id number]]     Order Specific Question:   [facility] Has the patient fasted?     Answer:    Yes    Lipid Panel     Order Specific Question:   Release to patient     Answer:   Routine Release [[id number]]     Order Specific Question:   [facility] Has the patient fasted?     Answer:   Yes    TSH     Order Specific Question:   Release to patient     Answer:   Routine Release [[id number]]     Order Specific Question:   [facility] Has the patient fasted?     Answer:   Yes    Vitamin D,25-Hydroxy     Order Specific Question:   Release to patient     Answer:   Routine Release [[id number]]     Order Specific Question:   [facility] Has the patient fasted?     Answer:   Yes       Return in about 6 months (around 2/28/2025).

## 2024-09-20 ENCOUNTER — PATIENT MESSAGE (OUTPATIENT)
Dept: INTERNAL MEDICINE | Facility: CLINIC | Age: 64
End: 2024-09-20
Payer: MEDICARE

## 2024-10-08 ENCOUNTER — PATIENT MESSAGE (OUTPATIENT)
Dept: INTERNAL MEDICINE | Facility: CLINIC | Age: 64
End: 2024-10-08
Payer: COMMERCIAL

## 2024-10-08 NOTE — TELEPHONE ENCOUNTER
Spoke with patient - she declined same day appt. Scheduled next day at 11am. Sent to Gretchen to schedule.

## 2024-10-09 ENCOUNTER — OFFICE VISIT (OUTPATIENT)
Dept: INTERNAL MEDICINE | Facility: CLINIC | Age: 64
End: 2024-10-09
Payer: MEDICARE

## 2024-10-09 VITALS
WEIGHT: 163.4 LBS | OXYGEN SATURATION: 97 % | TEMPERATURE: 98.2 F | SYSTOLIC BLOOD PRESSURE: 116 MMHG | BODY MASS INDEX: 32.94 KG/M2 | DIASTOLIC BLOOD PRESSURE: 72 MMHG | HEART RATE: 67 BPM | HEIGHT: 59 IN

## 2024-10-09 DIAGNOSIS — J06.9 VIRAL URI WITH COUGH: Primary | ICD-10-CM

## 2024-10-09 PROCEDURE — 1125F AMNT PAIN NOTED PAIN PRSNT: CPT | Performed by: NURSE PRACTITIONER

## 2024-10-09 PROCEDURE — 1159F MED LIST DOCD IN RCRD: CPT | Performed by: NURSE PRACTITIONER

## 2024-10-09 PROCEDURE — 1160F RVW MEDS BY RX/DR IN RCRD: CPT | Performed by: NURSE PRACTITIONER

## 2024-10-09 PROCEDURE — 3074F SYST BP LT 130 MM HG: CPT | Performed by: NURSE PRACTITIONER

## 2024-10-09 PROCEDURE — 99213 OFFICE O/P EST LOW 20 MIN: CPT | Performed by: NURSE PRACTITIONER

## 2024-10-09 PROCEDURE — 3078F DIAST BP <80 MM HG: CPT | Performed by: NURSE PRACTITIONER

## 2024-10-09 RX ORDER — DEXTROMETHORPHAN HYDROBROMIDE AND PROMETHAZINE HYDROCHLORIDE 15; 6.25 MG/5ML; MG/5ML
5 SYRUP ORAL 4 TIMES DAILY PRN
Qty: 118 ML | Refills: 0 | Status: SHIPPED | OUTPATIENT
Start: 2024-10-09

## 2024-10-09 RX ORDER — GUAIFENESIN 600 MG/1
600 TABLET, EXTENDED RELEASE ORAL EVERY 12 HOURS SCHEDULED
Start: 2024-10-09 | End: 2024-10-16

## 2024-10-09 NOTE — PROGRESS NOTES
"Chief Complaint  URI (cough, sneezing, headache, sore throat, nausea, chest tightness)  Subjective        Paula Trevino presents to [facility] PRIMARY [facility]  URI   This is a new problem. The current episode started in the past 7 days (began Sunday). The problem has been rapidly worsening. There has been no fever. The fever has been present for 3 to 4 days. Associated symptoms include congestion, coughing, ear pain (pressure in both ears, feel full), headaches, nausea, rhinorrhea, sinus pain and a sore throat. Pertinent negatives include no diarrhea. She has tried nothing for the symptoms.     History of Present Illness  The patient presents for evaluation of a cough.    She reports a productive cough that is particularly bothersome at night, disrupting her sleep. Her symptoms began on Sunday with a sore throat and sneezing. She also experiences headaches, chest discomfort, and a sensation of fullness in her ears. He has not taken any medication for these symptoms.    She had a COVID-19 infection in December 2023. She reports no fever, nausea, or diarrhea. She has not been exposed to any sick individuals. She was unable to work yesterday due to his symptoms.    Objective   Vital Signs:  /72 (BP Location: Left arm, Patient Position: Sitting, Cuff Size: Adult)   Pulse 67   Temp 98.2 °F (36.8 °C) (Oral)   Ht 149.9 cm (59\")   Wt 74.1 kg (163 lb 6.4 oz)   SpO2 97%   BMI 33.00 kg/m²   Estimated body mass index is 33 kg/m² as calculated from the following:    Height as of this encounter: 149.9 cm (59\").    Weight as of this encounter: 74.1 kg (163 lb 6.4 oz).            Physical Exam  HENT:      Head: Normocephalic.      Right Ear: External ear normal. Tympanic membrane is bulging.      Left Ear: External ear normal. Tympanic membrane is bulging.      Nose: Nose normal.      Mouth/Throat:      Pharynx: Posterior oropharyngeal erythema present.   Eyes:      General:         Right eye: No " discharge.         Left eye: No discharge.      Conjunctiva/sclera: Conjunctivae normal.   Cardiovascular:      Pulses: Normal pulses.      Heart sounds: Normal heart sounds. No murmur heard.  Pulmonary:      Effort: No respiratory distress.      Breath sounds: Normal breath sounds. No wheezing, rhonchi or rales.   Musculoskeletal:      Cervical back: Normal range of motion.   Lymphadenopathy:      Cervical: No cervical adenopathy.   Skin:     General: Skin is warm and dry.   Neurological:      Mental Status: She is alert.        Physical Exam      Vital Signs  Temperature is 98.2.    Result Review :           Results               Assessment and Plan   Diagnoses and all orders for this visit:    1. Viral URI with cough (Primary)  Assessment & Plan:  Symptoms began on Sunday and include coughing, headache, sinus pressure, ear fullness, dizziness, sore throat, and runny nose. The patient reports no fever, nausea, or diarrhea. Physical examination reveals clear lungs, slight throat redness, and no enlarged lymph nodes. The symptoms and loss of voice suggest a viral upper respiratory infection. Mucinex DM was recommended for twice-daily use to thin the mucus and provide cough suppression. A sample of Mucinex DM was provided. A prescription for cough medicine was sent to [facility] on [address]. Rest and hydration were advised. RTC if sx persist or worsen.    Orders:  -     guaiFENesin (Mucinex) 600 MG 12 hr tablet; Take 1 tablet by mouth Every 12 (Twelve) Hours for 7 days.  -     promethazine-dextromethorphan (PROMETHAZINE-DM) 6.25-15 MG/5ML syrup; Take 5 mL by mouth 4 (Four) Times a Day As Needed for Cough.  Dispense: 118 mL; Refill: 0      Assessment & Plan           Follow Up   Return if symptoms worsen or fail to improve, for Next scheduled follow up.  Patient was given instructions and counseling regarding her condition or for health maintenance advice. Please see specific information pulled into the AVS if  appropriate.     Patient or patient representative verbalized consent for the use of Ambient Listening during the visit with  JOSE Mueller for chart documentation. 10/26/2024  07:57 EDT

## 2024-10-26 PROBLEM — J06.9 VIRAL URI WITH COUGH: Status: ACTIVE | Noted: 2024-10-26

## 2024-10-26 NOTE — ASSESSMENT & PLAN NOTE
Symptoms began on Sunday and include coughing, headache, sinus pressure, ear fullness, dizziness, sore throat, and runny nose. The patient reports no fever, nausea, or diarrhea. Physical examination reveals clear lungs, slight throat redness, and no enlarged lymph nodes. The symptoms and loss of voice suggest a viral upper respiratory infection. Mucinex DM was recommended for twice-daily use to thin the mucus and provide cough suppression. A sample of Mucinex DM was provided. A prescription for cough medicine was sent to [facility] on [address]. Rest and hydration were advised. RTC if sx persist or worsen.   Resolved. Likely pre-renal/hemodynamic as evidenced by poor PO intake.  -Cr now normal (0.82).  -Continue to hold Losartan with YUE & hypotension.  -S/p IV fluids.

## 2024-12-19 DIAGNOSIS — F33.1 MODERATE EPISODE OF RECURRENT MAJOR DEPRESSIVE DISORDER: ICD-10-CM

## 2024-12-19 RX ORDER — VENLAFAXINE 37.5 MG/1
37.5 TABLET ORAL 2 TIMES DAILY
Qty: 180 TABLET | Refills: 0 | Status: SHIPPED | OUTPATIENT
Start: 2024-12-19
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
## 2024-12-23 DIAGNOSIS — M54.9 ACUTE BILATERAL BACK PAIN, UNSPECIFIED BACK LOCATION: ICD-10-CM

## 2024-12-23 RX ORDER — MELOXICAM 15 MG/1
15 TABLET ORAL DAILY
Qty: 30 TABLET | Refills: 0 | Status: SHIPPED | OUTPATIENT
Start: 2024-12-23

## 2024-12-23 NOTE — TELEPHONE ENCOUNTER
Rx Refill Note  Requested Prescriptions     Pending Prescriptions Disp Refills    meloxicam (MOBIC) 15 MG tablet [Pharmacy Med Name: Meloxicam 15 MG Oral Tablet] 30 tablet 0     Sig: Take 1 tablet by mouth once daily with food      Last office visit with prescribing clinician: 10/9/2024   Last telemedicine visit with prescribing clinician: Visit date not found   Next office visit with prescribing clinician: 3/7/2025                         Would you like a call back once the refill request has been completed: [] Yes [] No    If the office needs to give you a call back, can they leave a voicemail: [] Yes [] No    Tammie Marcos  12/23/24, 12:09 EST

## 2025-01-16 DIAGNOSIS — I10 ESSENTIAL HYPERTENSION: ICD-10-CM

## 2025-01-16 RX ORDER — LISINOPRIL 10 MG/1
10 TABLET ORAL DAILY
Qty: 90 TABLET | Refills: 0 | Status: SHIPPED | OUTPATIENT
Start: 2025-01-16
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
## 2025-01-17 DIAGNOSIS — M54.9 ACUTE BILATERAL BACK PAIN, UNSPECIFIED BACK LOCATION: ICD-10-CM

## 2025-01-17 RX ORDER — MELOXICAM 15 MG/1
15 TABLET ORAL DAILY
Qty: 30 TABLET | Refills: 0 | Status: SHIPPED | OUTPATIENT
Start: 2025-01-17

## 2025-01-30 ENCOUNTER — PATIENT MESSAGE (OUTPATIENT)
Dept: INTERNAL MEDICINE | Facility: CLINIC | Age: 65
End: 2025-01-30
Payer: MEDICARE

## 2025-02-05 ENCOUNTER — TELEPHONE (OUTPATIENT)
Dept: INTERNAL MEDICINE | Facility: CLINIC | Age: 65
End: 2025-02-05
Payer: MEDICARE

## 2025-02-11 ENCOUNTER — TRANSCRIBE ORDERS (OUTPATIENT)
Dept: OCCUPATIONAL THERAPY | Facility: CLINIC | Age: 65
End: 2025-02-11
Payer: MEDICARE

## 2025-02-11 DIAGNOSIS — S46.912D LEFT SHOULDER STRAIN, SUBSEQUENT ENCOUNTER: Primary | ICD-10-CM

## 2025-02-14 DIAGNOSIS — M54.9 ACUTE BILATERAL BACK PAIN, UNSPECIFIED BACK LOCATION: ICD-10-CM
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
## 2025-02-14 RX ORDER — MELOXICAM 15 MG/1
15 TABLET ORAL DAILY
Qty: 30 TABLET | Refills: 0 | Status: SHIPPED | OUTPATIENT
Start: 2025-02-14

## 2025-02-14 NOTE — TELEPHONE ENCOUNTER
Rx Refill Note  Requested Prescriptions     Pending Prescriptions Disp Refills    meloxicam (MOBIC) 15 MG tablet [Pharmacy Med Name: Meloxicam 15 MG Oral Tablet] 30 tablet 0     Sig: Take 1 tablet by mouth once daily with food      Last office visit with prescribing clinician: 10/9/2024   Last telemedicine visit with prescribing clinician: Visit date not found   Next office visit with prescribing clinician: 3/7/2025                         Would you like a call back once the refill request has been completed: [] Yes [] No    If the office needs to give you a call back, can they leave a voicemail: [] Yes [] No    Tammie Marcos  02/14/25, 09:50 EST

## 2025-02-27 ENCOUNTER — TREATMENT (OUTPATIENT)
Dept: PHYSICAL THERAPY | Facility: CLINIC | Age: 65
End: 2025-02-27
Payer: OTHER MISCELLANEOUS

## 2025-02-27 DIAGNOSIS — S46.912D STRAIN OF LEFT SHOULDER, SUBSEQUENT ENCOUNTER: ICD-10-CM

## 2025-02-27 DIAGNOSIS — M25.512 ACUTE PAIN OF LEFT SHOULDER: Primary | ICD-10-CM

## 2025-02-27 NOTE — PROGRESS NOTES
Physical Therapy Initial Evaluation and Plan of Care    [address] [facility]   [place] [address]  [phone]  Patient: Paula Trevino   : 1960  Diagnosis/ICD-10 Code:  Acute pain of left shoulder [M25.512]  Referring practitioner: JOSE Perez  Date of Initial Visit: 2025  Today's Date: 2025  Patient seen for 1 session         Visit Diagnoses:    ICD-10-CM ICD-9-CM   1. Acute pain of left shoulder  M25.512 719.41   2. Strain of left shoulder, subsequent encounter  S46.912D V58.89     840.9           Subjective Evaluation    History of Present Illness  Mechanism of injury: About 3 wks ago - Taking a dog out of playroom - dog bolted and jerked her arm; has had prior similar injuries to that shoulder and treated with injections.  Reports frequent popping.  Is RHD.    Other med Hx - osteopenia      Patient Occupation: pet care specialist   Precautions and Work Restrictions: no reaching; 20#Pain  Current pain ratin  At worst pain ratin  Location: all around left shoulder and into arm  Quality: throbbing and sharp  Relieving factors: rest  Aggravating factors: repetitive movement, movement, sleeping, overhead activity, outstretched reach and lifting (pulling up pants)  Progression: no change    Diagnostic Tests  No diagnostic tests performed    Patient Goals  Patient goals for therapy: decreased pain, increased motion and return to work             Objective          Postural Observations    Additional Postural Observation Details  Slouched sitting, FHP    Tenderness     Left Shoulder   Tenderness in the bicipital groove, infraspinatus tendon, scapular spine and supraspinatus tendon.     Cervical/Thoracic Screen   Cervical range of motion within normal limits with the following exceptions: All limited but grossly ~ WFL and mild pain with all    Active Range of Motion   Left Shoulder   Flexion: 106 degrees   Abduction: 105 (pop on return) degrees   External rotation BTH:  Active external rotation behind the head: to back of head.   Internal rotation BTB: sacrum     Additional Active Range of Motion Details  R shdr WFL    Passive Range of Motion   Left Shoulder   Flexion: 127 degrees   Abduction: 143 (scap plane) degrees   External rotation 45°: 57 degrees   Internal rotation 45°: 70 degrees     Additional Passive Range of Motion Details  Empty end feels    Strength/Myotome Testing     Left Shoulder     Planes of Motion   Flexion: 4 (at 90 but pain after)   Abduction: 4- (pain on return)   External rotation at 0°: 4-   Internal rotation at 0°: 4     Tests   Cervical     Left   Positive active compression (Avery).     Left Shoulder   Positive empty can and Speed's.     Additional Tests Details  Pain but control with Drop Arm L          Assessment & Plan       Assessment  Impairments: abnormal or restricted ROM, activity intolerance, impaired physical strength and pain with function   Functional limitations: lifting, sleeping, uncomfortable because of pain, reaching behind back and reaching overhead   Assessment details: Paula Trevino is 64 y.o. female who presents today to Physical Therapy for acute onset pain non-dominant shoulder ~ 3 wks ago from her left arm being jerked walking a dog. Has a Hx of prior injuries to that shoulder of similar VANITA at work. Presents with signs/sxs of moderate strain with possible tear labrum and/or RC. Patient would benefit from skilled physical therapy to address functional limitations and impairments to improve quality of life and return to regular work.  Prognosis: fair    Goals  Plan Goals: STGs x 2 wks  1. ROM improving with pain < 6/10  2. Demos compliance with initial HEP and tolerates progression to light strengthening  3. Review posturing and body mechanics with ADLs (initiated at eval)  4. Reports decreased popping with movement    LTGs x 4 wks  1. ROM restored to pre-injury levels or WFL  2. Strength per MMT 4+/5  3. Negative impingement and  instability testing  4. Demonstrates tolerance for pull/push and lift 40lbs     Plan  Therapy options: will be seen for skilled therapy services  Planned modality interventions: cryotherapy, electrical stimulation/Macanese stimulation and ultrasound  Planned therapy interventions: manual therapy, strengthening, stretching, therapeutic activities, home exercise program, neuromuscular re-education, body mechanics training and functional ROM exercises  Frequency: 3x week  Duration in weeks: 4  Treatment plan discussed with: patient        History # of Personal Factors and/or Comorbidities: MODERATE (1-2)  Examination of Body System(s): # of elements: LOW (1-2)  Clinical Presentation: EVOLVING  Clinical Decision Making: LOW       Timed:         Manual Therapy:    0     mins  69000;     Therapeutic Exercise:    15     mins  77830;     Neuromuscular Spencer:    0    mins  03737;    Therapeutic Activity:     10     mins  81090;     Gait Trainin     mins  02890;     Ultrasound:     0     mins  42352;    Ionto                               0    mins   43046  Self Care                       0     mins   70437      Un-Timed:  Electrical Stimulation:    0     mins  26523 (MC );  Dry Needling     0     mins self-pay  Traction     0     mins 53853  Low Eval     20    Mins  93621  Mod Eval     0     Mins  75779  High Eval                       0     Mins  30009  Canalith Repos    0     mins 03709      Timed Treatment:   25   mins   Total Treatment:     57   mins          PT: Debbie Castano PT     License Number: [id number]  Electronically signed by Debbie Castano PT, 25, 9:04 AM EST    Certification Period: 2025 thru 2025  I certify that the therapy services are furnished while this patient is under my care.  The services outlined above are required by this patient, and will be reviewed every 90 days.         Physician Signature:__________________________________________________    PHYSICIAN: Alexei Ngo  JOSE OROZCO  NPI: [id number]                                      DATE:      Please sign and return via fax to [email or web address] . Thank you, [facility].

## 2025-02-28 ENCOUNTER — TREATMENT (OUTPATIENT)
Dept: PHYSICAL THERAPY | Facility: CLINIC | Age: 65
End: 2025-02-28
Payer: OTHER MISCELLANEOUS

## 2025-02-28 DIAGNOSIS — S46.912D STRAIN OF LEFT SHOULDER, SUBSEQUENT ENCOUNTER: ICD-10-CM

## 2025-02-28 DIAGNOSIS — M25.512 ACUTE PAIN OF LEFT SHOULDER: Primary | ICD-10-CM

## 2025-02-28 NOTE — PROGRESS NOTES
Physical Therapy Daily Treatment Note  2/28/2025  Visit Diagnoses:    ICD-10-CM ICD-9-CM   1. Acute pain of left shoulder  M25.512 719.41   2. Strain of left shoulder, subsequent encounter  S46.912D V58.89     840.9       VISIT#: 2      Paula Trevino reports: Her shoulder is the same as yesterday  Current Pain Level:    6/10; Worst:   8/10  Affected Area: all around left shoulder and into arm to elbow, Supraspinatus, UT on L L C/S  Quality of Pain: throbbing and sharp, throbbing   Functional Deficits/Irritating Factors: repetitive movement, movement, sleeping, overhead activity, outstretched reach and lifting (pulling up pants)   Progression: same    Compliance with HEP Reported: just had PT yesterday - no time yet    Objective  Presents: Protracted scapula. L shoulder more anterior/rounded than R    Added to Program: chin tuck, B shoulder ER, Serratus Punches, alphabet     PROM L shoulder is full range ER, limited abduction to approx 90 deg with pain, IR limited to approx 50 deg; Flexion limited to approx 160 deg with end range pain.    Tender to palpitation along medial and superior border of L scapula    Tightness in L UT, Levator and L cervical paraspinals    See Exercise, Manual, and Modality Logs for complete treatment.     Patient Education: Pt was educated on exercise biomechanical correctness, intensity, and speed.     Assessment:  Laila shoulder has pain at end range flexion and abduction and is limited in range of motion also. Tightness present in L UT and cervical paraspinals and Levator along with tenderness. Limited in ability to perform ADLs and work tasks.  Pt will continue to benefit from skilled PT interventions to address current functional deficits and impairments.       Plan: Progress to/Continue with current program.       Timed:  Manual Therapy:            23_    mins  51356;  Ultrasound:                     0      mins  86656;   Therapeutic Exercise:    23     mins  70494;      Neuromuscular Spencer:   0     mins  35944;    Therapeutic Activity:      0     mins  51696;      Iontophoresis              _0__   mins  Dry Needling               _0____   mins         Untimed:  Work Conditioning: __0__ mins 12955  Electrical Stimulation:    15    mins  78165 ( );  Mechanical Traction:       0        mins  90903;   Paraffin                       __0___  mins   Ice/Heat: __15 with stim__ mins      Timed Treatment:   46   mins   Total Treatment:     61   mins          Yuni Shirley PTA  [place] License # [id number]  Physical Therapist Assistant

## 2025-03-03 ENCOUNTER — TREATMENT (OUTPATIENT)
Dept: PHYSICAL THERAPY | Facility: CLINIC | Age: 65
End: 2025-03-03
Payer: OTHER MISCELLANEOUS

## 2025-03-03 DIAGNOSIS — S46.912D STRAIN OF LEFT SHOULDER, SUBSEQUENT ENCOUNTER: ICD-10-CM

## 2025-03-03 DIAGNOSIS — M25.512 ACUTE PAIN OF LEFT SHOULDER: Primary | ICD-10-CM

## 2025-03-03 NOTE — PROGRESS NOTES
Physical Therapy Daily Treatment Note  3/3/2025  Visit Diagnoses:    ICD-10-CM ICD-9-CM   1. Acute pain of left shoulder  M25.512 719.41   2. Strain of left shoulder, subsequent encounter  S46.912D V58.89     840.9       VISIT#: 3      Paula Trevino reports: She is very tired today. She did not sleep well. It was not her shoulder keeping her up. Her shoulder had been feeling better until Saturday and then it started hurting more.   Current Pain Level:    5/10; Worst:   8/10  Affected Area: all around left shoulder and into arm to elbow, Supraspinatus, UT on L L C/S  Quality of Pain: throbbing and sharp, throbbing   Functional Deficits/Irritating Factors: repetitive movement, movement, sleeping, overhead activity, outstretched reach and lifting (pulling up pants)   Progression: improving until Saturday  Compliance with HEP Reported: No not doing them    Objective  Presents: Protracted scapula. L shoulder more anterior/rounded than R   Increased sets/reps of:  scapular retraction   Increased resistance on:  Alphabet, serratus punches  Added to Program: none      AROM L shoulder Flexion = 140 deg with eccentric pain; Abduction = 130 deg with pain    See Exercise, Manual, and Modality Logs for complete treatment.     Patient Education: Pt was educated on exercise biomechanical correctness, intensity, and speed.     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 She has had significant improvement in her active shoulder motion. However, pain is still persistent, mainly in abduction but some also at end range flexion. Internal rotation has visual improvement but was not measured. The IR end range feels stiff and tight and may need mobilization for that movement.  Pt will continue to benefit from skilled PT interventions to address current functional deficits and impairments.     Plan Goals: STGs x 2 wks  1. ROM improving with pain  < 6/10  2. Demos compliance with initial HEP and tolerates progression to light strengthening  3. Review posturing and body mechanics with ADLs (initiated at eval)  4. Reports decreased popping with movement     LTGs x 4 wks  1. ROM restored to pre-injury levels or WFL  2. Strength per MMT 4+/5  3. Negative impingement and instability testing  4. Demonstrates tolerance for pull/push and lift 40lbs       Plan: Progress to/Continue with current program.       Timed:  Manual Therapy:            10_    mins  91383;  Ultrasound:                     0      mins  20206;   Therapeutic Exercise:    20     mins  08737;     Neuromuscular Spencer:  10      mins  98162;    Therapeutic Activity:      0     mins  12768;      Iontophoresis              _0__   mins  Dry Needling               _0____   mins         Untimed:  Work Conditioning: __0__ mins 94322  Electrical Stimulation:    15    mins  42337 ( );  Mechanical Traction:       0        mins  91211;   Paraffin                       __0___  mins   Ice/Heat: __15 with stim__ mins      Timed Treatment:   40   mins   Total Treatment:     55   mins          GIACOMO Solomon License # [id number]  Physical Therapist Assistant

## 2025-03-05 ENCOUNTER — TREATMENT (OUTPATIENT)
Dept: PHYSICAL THERAPY | Facility: CLINIC | Age: 65
End: 2025-03-05
Payer: OTHER MISCELLANEOUS

## 2025-03-05 DIAGNOSIS — S46.912D STRAIN OF LEFT SHOULDER, SUBSEQUENT ENCOUNTER: ICD-10-CM

## 2025-03-05 DIAGNOSIS — M25.512 ACUTE PAIN OF LEFT SHOULDER: Primary | ICD-10-CM

## 2025-03-05 NOTE — PROGRESS NOTES
"Physical Therapy Daily Treatment Note  [facility] [address]   [address] [place] [address]  P: [phone] F: [phone]    Patient: Paula Trevino   : 1960  Referring practitioner: JOSE Perez  Date of Initial Visit: Type: THERAPY  Noted: 2025  Today's Date: 3/5/2025  Patient seen for 4 sessions       Visit Diagnoses:    ICD-10-CM ICD-9-CM   1. Acute pain of left shoulder  M25.512 719.41   2. Strain of left shoulder, subsequent encounter  S46.912D V58.89     840.9         Subjective:  Paula Trevino reports:     Pt states She is doing just ok today and had a little more pain than usual soreness since previous session. Overal LUE symptoms seem to be slighlty worse today and she is not sure why.     Pt reported Pros - \"Feeling better than I was at the beginning\"  Pt reported Cons - \"My shoulder hurts on the way down after I lift it up\"    Objective     See Exercise, Manual, and Modality Logs for complete treatment.       Assessment:  Pt demonstrates mild regression with managing discomfort since her last visit, but strength, and functional activity tolerance are about the same based on subjective report and observation during exercise this date.        Plan:   Continue to monitor and progress ROM / strengthening / stabilization / functional activity as tolerated        Timed Codes:  Manual Therapy -    15   mins  76599;  Therapeutic Exercise: -   15   mins  61077;     Neuromuscular Spencer -   15   mins  58298;    Therapeutic Activity -    0   mins  20131;     Ultrasound -                     0   mins  59992  Iontophoresis -                 0   mins  69718  _____________________________________  Timed Treatment -    45   mins      Untimed Codes:  Electrical Stimulation -   15  mins  34811 ([id number])  Traction -                0  mins  62993    Total Treatment Time:  60  mins       BETTY Stanford License #: [id number]    Physical Therapist      "

## 2025-03-07 ENCOUNTER — OFFICE VISIT (OUTPATIENT)
Dept: INTERNAL MEDICINE | Facility: CLINIC | Age: 65
End: 2025-03-07
Payer: MEDICARE

## 2025-03-07 VITALS
DIASTOLIC BLOOD PRESSURE: 70 MMHG | WEIGHT: 155.2 LBS | BODY MASS INDEX: 31.29 KG/M2 | SYSTOLIC BLOOD PRESSURE: 114 MMHG | HEIGHT: 59 IN | HEART RATE: 70 BPM | OXYGEN SATURATION: 99 %

## 2025-03-07 DIAGNOSIS — I10 ESSENTIAL HYPERTENSION: Primary | Chronic | ICD-10-CM

## 2025-03-07 DIAGNOSIS — J45.20 MILD INTERMITTENT ASTHMA WITHOUT COMPLICATION: Chronic | ICD-10-CM

## 2025-03-07 DIAGNOSIS — M25.512 ACUTE PAIN OF LEFT SHOULDER: ICD-10-CM

## 2025-03-07 DIAGNOSIS — F33.1 MODERATE EPISODE OF RECURRENT MAJOR DEPRESSIVE DISORDER: Chronic | ICD-10-CM

## 2025-03-07 DIAGNOSIS — Z12.31 ENCOUNTER FOR SCREENING MAMMOGRAM FOR MALIGNANT NEOPLASM OF BREAST: ICD-10-CM

## 2025-03-07 DIAGNOSIS — R07.89 ATYPICAL CHEST PAIN: ICD-10-CM

## 2025-03-07 PROBLEM — M79.672 PAIN IN BOTH FEET: Status: RESOLVED | Noted: 2024-07-06 | Resolved: 2025-03-07

## 2025-03-07 PROBLEM — M79.671 PAIN IN BOTH FEET: Status: RESOLVED | Noted: 2024-07-06 | Resolved: 2025-03-07

## 2025-03-07 PROBLEM — J06.9 VIRAL URI WITH COUGH: Status: RESOLVED | Noted: 2024-10-26 | Resolved: 2025-03-07

## 2025-03-07 RX ORDER — AMOXICILLIN 500 MG/1
CAPSULE ORAL
COMMUNITY
Start: 2025-02-27

## 2025-03-07 RX ORDER — VENLAFAXINE HYDROCHLORIDE 75 MG/1
75 CAPSULE, EXTENDED RELEASE ORAL DAILY
Qty: 90 CAPSULE | Refills: 1 | Status: SHIPPED | OUTPATIENT
Start: 2025-03-07

## 2025-03-08 ENCOUNTER — RESULTS FOLLOW-UP (OUTPATIENT)
Dept: INTERNAL MEDICINE | Facility: CLINIC | Age: 65
End: 2025-03-08
Payer: MEDICARE

## 2025-03-08 DIAGNOSIS — J20.9 ACUTE BRONCHITIS, UNSPECIFIED ORGANISM: ICD-10-CM

## 2025-03-08 LAB
ALBUMIN SERPL-MCNC: 4.5 G/DL (ref 3.5–5.2)
ALBUMIN/GLOB SERPL: 2.3 G/DL
ALP SERPL-CCNC: 132 U/L (ref 39–117)
ALT SERPL-CCNC: 20 U/L (ref 1–33)
AST SERPL-CCNC: 19 U/L (ref 1–32)
BILIRUB SERPL-MCNC: 0.5 MG/DL (ref 0–1.2)
BUN SERPL-MCNC: 16 MG/DL (ref 8–23)
BUN/CREAT SERPL: 20.5 (ref 7–25)
CALCIUM SERPL-MCNC: 9.5 MG/DL (ref 8.6–10.5)
CHLORIDE SERPL-SCNC: 100 MMOL/L (ref 98–107)
CO2 SERPL-SCNC: 29.6 MMOL/L (ref 22–29)
CREAT SERPL-MCNC: 0.78 MG/DL (ref 0.57–1)
EGFRCR SERPLBLD CKD-EPI 2021: 83.9 ML/MIN/1.73
ERYTHROCYTE [DISTWIDTH] IN BLOOD BY AUTOMATED COUNT: 13.2 % (ref 12.3–15.4)
GLOBULIN SER CALC-MCNC: 2 GM/DL
GLUCOSE SERPL-MCNC: 94 MG/DL (ref 65–99)
HCT VFR BLD AUTO: 43.1 % (ref 34–46.6)
HGB BLD-MCNC: 14.1 G/DL (ref 12–15.9)
MCH RBC QN AUTO: 28.5 PG (ref 26.6–33)
MCHC RBC AUTO-ENTMCNC: 32.7 G/DL (ref 31.5–35.7)
MCV RBC AUTO: 87.2 FL (ref 79–97)
PLATELET # BLD AUTO: 270 10*3/MM3 (ref 140–450)
POTASSIUM SERPL-SCNC: 4.4 MMOL/L (ref 3.5–5.2)
PROT SERPL-MCNC: 6.5 G/DL (ref 6–8.5)
RBC # BLD AUTO: 4.94 10*6/MM3 (ref 3.77–5.28)
SODIUM SERPL-SCNC: 137 MMOL/L (ref 136–145)
WBC # BLD AUTO: 5.76 10*3/MM3 (ref 3.4–10.8)

## 2025-03-13 DIAGNOSIS — J20.9 ACUTE BRONCHITIS, UNSPECIFIED ORGANISM: ICD-10-CM

## 2025-03-13 RX ORDER — DEXTROMETHORPHAN HYDROBROMIDE AND PROMETHAZINE HYDROCHLORIDE 15; 6.25 MG/5ML; MG/5ML
5 SYRUP ORAL 4 TIMES DAILY PRN
Qty: 118 ML | Refills: 0 | Status: CANCELLED | OUTPATIENT
Start: 2025-03-13

## 2025-03-13 RX ORDER — DEXTROMETHORPHAN HYDROBROMIDE AND PROMETHAZINE HYDROCHLORIDE 15; 6.25 MG/5ML; MG/5ML
5 SYRUP ORAL 4 TIMES DAILY PRN
Qty: 118 ML | Refills: 0 | Status: SHIPPED | OUTPATIENT
Start: 2025-03-13

## 2025-03-22 PROBLEM — R07.89 ATYPICAL CHEST PAIN: Status: ACTIVE | Noted: 2025-03-22

## 2025-03-22 NOTE — ASSESSMENT & PLAN NOTE
The patient reports intermittent chest pain, described as pressure and a need to burp, which sometimes resolves after drinking a carbonated beverage. Sx appear more GI related, start Pepcid daily. She has a history of costochondritis, which may be contributing to her symptoms. She is advised to seek immediate medical attention at the ER if she experiences chest pain again, as her heart health has not been thoroughly evaluated.

## 2025-03-22 NOTE — ASSESSMENT & PLAN NOTE
The patient is currently taking venlafaxine 37.5 mg twice daily but finds it difficult to adhere to the twice-daily regimen. Her medication will be switched to venlafaxine extended-release 75 mg once daily to improve adherence. A prescription for a 90-day supply will be sent to [facility]. If her symptoms persist, the dosage may be adjusted (will hopefully improve with more consistent dosing).

## 2025-03-22 NOTE — ASSESSMENT & PLAN NOTE
The patient has been experiencing left shoulder pain for about a month due to a work-related injury. She has been attending physical therapy and taking meloxicam as needed. She reports that the pain persists and has not had any x-rays done. If physical therapy does not improve her symptoms, an MRI or further testing may be necessary to rule out any tears or other issues.

## 2025-03-22 NOTE — ASSESSMENT & PLAN NOTE
The patient's asthma is currently well-controlled, with infrequent use of her albuterol inhaler. She reports that her symptoms are more bothersome at work when exposed to cleaning agents.

## 2025-03-22 NOTE — PROGRESS NOTES
Chief Complaint  Hypertension (6 month follow up), Shoulder Pain, and Toe Injury (Swelling - injury in November)  Subjective        Paula Trevino presents to [facility]  History of Present Illness  History of Present Illness  The patient presents for evaluation of left shoulder pain, chest pain, depression, right little toe injury, and health maintenance.    She reports experiencing pain in her left shoulder, which she attributes to a work-related injury sustained approximately 1 month ago. She has been undergoing physical therapy for this issue but has not had any radiographic examinations. Her current employment status is restricted due to her inability to lift objects weighing over 20 pounds. She has been prescribed Meloxicam for pain management, which she takes intermittently. Additionally, she finds relief from a muscle relaxant, tizanidine, that was prescribed in September.    She also reports persistent lower back pain which has been chronic in nature. Denies radicular symptoms or change in bowel/bladder function.    She has been experiencing intermittent chest pain, described as a sensation of pressure or heaviness which improves with burping. She recalls an episode where the pain subsided after consuming a carbonated beverage.     She sustained an injury to her right little toe in November, resulting in discoloration of the entire foot. She suspects a fracture due to the persistent pain and redness, although she notes some improvement.    She is currently on lisinopril for blood pressure management and calcium supplements. She reports occasional use of an albuterol inhaler, primarily at work. She has declined further COVID-19 boosters. She has been taking vitamin D supplements but has run out and needs to purchase more. She has experienced unintentional weight loss, which she attributes to increased physical activity.    She is on venlafaxine twice daily for depression, which  "she finds beneficial. However, she admits to struggling with adherence to the twice-daily regimen and often misses the evening dose. She continues to experience depressive symptoms.    Supplemental Information  She reports experiencing facial pain and toothache, for which she takes Tylenol. She is currently on a course of amoxicillin, prescribed four times daily, but admits to only taking it three times daily.     Objective   Vital Signs:  /70 (BP Location: Left arm, Patient Position: Sitting, Cuff Size: Adult)   Pulse 70   Ht 149.9 cm (59\")   Wt 70.4 kg (155 lb 3.2 oz)   SpO2 99%   BMI 31.35 kg/m²   Estimated body mass index is 31.35 kg/m² as calculated from the following:    Height as of this encounter: 149.9 cm (59\").    Weight as of this encounter: 70.4 kg (155 lb 3.2 oz).            Physical Exam  Constitutional:       Appearance: She is well-developed. She is not ill-appearing.   HENT:      Head: Normocephalic.      Right Ear: Hearing, tympanic membrane and external ear normal.      Left Ear: Hearing, tympanic membrane and external ear normal.      Nose: Nose normal. No nasal deformity, mucosal edema or rhinorrhea.      Right Sinus: No maxillary sinus tenderness or frontal sinus tenderness.      Left Sinus: No maxillary sinus tenderness or frontal sinus tenderness.      Mouth/Throat:      Dentition: Normal dentition.   Eyes:      General: Lids are normal.         Right eye: No discharge.         Left eye: No discharge.      Conjunctiva/sclera: Conjunctivae normal.      Right eye: No exudate.     Left eye: No exudate.  Neck:      Thyroid: No thyroid mass or thyromegaly.      Vascular: No carotid bruit.      Trachea: Trachea normal.   Cardiovascular:      Rate and Rhythm: Regular rhythm.      Pulses: Normal pulses.      Heart sounds: Normal heart sounds. No murmur heard.  Pulmonary:      Effort: No respiratory distress.      Breath sounds: Normal breath sounds. No decreased breath sounds, wheezing, " rhonchi or rales.   Abdominal:      General: Bowel sounds are normal.      Palpations: Abdomen is soft.      Tenderness: There is no abdominal tenderness.   Musculoskeletal:      Cervical back: Normal range of motion. No edema.   Lymphadenopathy:      Head:      Right side of head: No submental, submandibular, tonsillar, preauricular, posterior auricular or occipital adenopathy.      Left side of head: No submental, submandibular, tonsillar, preauricular, posterior auricular or occipital adenopathy.   Skin:     General: Skin is warm and dry.      Nails: There is no clubbing.   Neurological:      Mental Status: She is alert.   Psychiatric:         Behavior: Behavior is cooperative.        Physical Exam      Vital Signs  Blood pressure is normal.    Result Review :  The following data was reviewed by: JOSE Mueller on 03/07/2025:  Common labs          8/30/2024    15:26 3/7/2025    14:33   Common Labs   Glucose 84  94    BUN 13  16    Creatinine 0.87  0.78    Sodium 138  137    Potassium 4.4  4.4    Chloride 100  100    Calcium 9.4  9.5    Albumin 4.3  4.5    Total Bilirubin 1.0  0.5    Alkaline Phosphatase 99  132    AST (SGOT) 23  19    ALT (SGPT) 28  20    WBC 6.90  5.76    Hemoglobin 14.1  14.1    Hematocrit 41.5  43.1    Platelets 211  270    Total Cholesterol 203     Triglycerides 55     HDL Cholesterol 66     LDL Cholesterol  127            Results  Laboratory Studies  Vitamin D level was a little low. LDL cholesterol was 127. Thyroid function was normal.    Testing  ColoGuard testing was negative for colon cancer.             Assessment and Plan   Diagnoses and all orders for this visit:    1. Essential hypertension (Primary)  Assessment & Plan:  BP is well-controlled on lisinopril which she will continue along with a low sodium diet.    Orders:  -     CBC (No Diff)  -     Comprehensive Metabolic Panel    2. Acute pain of left shoulder  Assessment & Plan:  The patient has been experiencing left  shoulder pain for about a month due to a work-related injury. She has been attending physical therapy and taking meloxicam as needed. She reports that the pain persists and has not had any x-rays done. If physical therapy does not improve her symptoms, an MRI or further testing may be necessary to rule out any tears or other issues.      3. Moderate episode of recurrent major depressive disorder  Assessment & Plan:  The patient is currently taking venlafaxine 37.5 mg twice daily but finds it difficult to adhere to the twice-daily regimen. Her medication will be switched to venlafaxine extended-release 75 mg once daily to improve adherence. A prescription for a 90-day supply will be sent to [facility] If her symptoms persist, the dosage may be adjusted (will hopefully improve with more consistent dosing).    Orders:  -     venlafaxine XR (EFFEXOR-XR) 75 MG 24 hr capsule; Take 1 capsule by mouth Daily.  Dispense: 90 capsule; Refill: 1    4. Mild intermittent asthma without complication  Assessment & Plan:  The patient's asthma is currently well-controlled, with infrequent use of her albuterol inhaler. She reports that her symptoms are more bothersome at work when exposed to cleaning agents.      5. Atypical chest pain  Assessment & Plan:  The patient reports intermittent chest pain, described as pressure and a need to burp, which sometimes resolves after drinking a carbonated beverage. Sx appear more GI related, start Pepcid daily. She has a history of costochondritis, which may be contributing to her symptoms. She is advised to seek immediate medical attention at the ER if she experiences chest pain again, as her heart health has not been thoroughly evaluated.      6. Encounter for screening mammogram for malignant neoplasm of breast  -     Mammo Screening Digital Tomosynthesis Bilateral With CAD; Future      Assessment & Plan    Health maintenance.  The patient's blood pressure is well-regulated with lisinopril. Her  ColoGuard test was negative for colon cancer. She has experienced a weight loss of approximately 5 pounds since her last visit.   Her vitamin D levels were slightly low during her last blood work in August. Thyroid function was normal. Cholesterol levels were slightly elevated, with an LDL of 127. Kidney and liver functions were within normal limits.   A mammogram has been ordered, as it is due on 04/23/2025. Blood work will be conducted today to assess kidney and liver function, as well as complete blood count (CBC).          Follow Up   Return in about 6 months (around 9/7/2025) for wellness-fasting.  Patient was given instructions and counseling regarding her condition or for health maintenance advice. Please see specific information pulled into the AVS if appropriate.     Patient or patient representative verbalized consent for the use of Ambient Listening during the visit with  JOSE Mueller for chart documentation. 3/22/2025  18:26 EDT

## 2025-04-09 ENCOUNTER — TELEPHONE (OUTPATIENT)
Dept: PHYSICAL THERAPY | Facility: CLINIC | Age: 65
End: 2025-04-09
Payer: MEDICARE

## 2025-04-17 ENCOUNTER — OFFICE VISIT (OUTPATIENT)
Dept: INTERNAL MEDICINE | Facility: CLINIC | Age: 65
End: 2025-04-17
Payer: MEDICARE

## 2025-04-17 VITALS
OXYGEN SATURATION: 98 % | BODY MASS INDEX: 31.65 KG/M2 | WEIGHT: 157 LBS | TEMPERATURE: 97.1 F | HEART RATE: 68 BPM | HEIGHT: 59 IN | DIASTOLIC BLOOD PRESSURE: 80 MMHG | SYSTOLIC BLOOD PRESSURE: 126 MMHG

## 2025-04-17 DIAGNOSIS — N30.90 CYSTITIS: Primary | ICD-10-CM

## 2025-04-17 DIAGNOSIS — R30.0 DYSURIA: ICD-10-CM

## 2025-04-17 DIAGNOSIS — R39.15 URINARY URGENCY: ICD-10-CM

## 2025-04-17 DIAGNOSIS — I10 ESSENTIAL HYPERTENSION: ICD-10-CM

## 2025-04-17 DIAGNOSIS — M54.40 ACUTE BILATERAL LOW BACK PAIN WITH SCIATICA, SCIATICA LATERALITY UNSPECIFIED: ICD-10-CM

## 2025-04-17 DIAGNOSIS — R39.198 DIFFICULTY URINATING: ICD-10-CM

## 2025-04-17 LAB
BILIRUB BLD-MCNC: NEGATIVE MG/DL
CLARITY, POC: ABNORMAL
COLOR UR: YELLOW
EXPIRATION DATE: ABNORMAL
GLUCOSE UR STRIP-MCNC: NEGATIVE MG/DL
KETONES UR QL: NEGATIVE
LEUKOCYTE EST, POC: ABNORMAL
Lab: ABNORMAL
NITRITE UR-MCNC: NEGATIVE MG/ML
PH UR: 5.5 [PH] (ref 5–8)
PROT UR STRIP-MCNC: NEGATIVE MG/DL
RBC # UR STRIP: ABNORMAL /UL
SP GR UR: 1.02 (ref 1–1.03)
UROBILINOGEN UR QL: ABNORMAL

## 2025-04-17 RX ORDER — PHENAZOPYRIDINE HYDROCHLORIDE 100 MG/1
100 TABLET, FILM COATED ORAL 3 TIMES DAILY PRN
Qty: 30 TABLET | Refills: 0 | Status: SHIPPED | OUTPATIENT
Start: 2025-04-17

## 2025-04-17 RX ORDER — NITROFURANTOIN 25; 75 MG/1; MG/1
100 CAPSULE ORAL 2 TIMES DAILY
Qty: 14 CAPSULE | Refills: 0 | Status: SHIPPED | OUTPATIENT
Start: 2025-04-17

## 2025-04-17 NOTE — PATIENT INSTRUCTIONS
Urinary Tract Infection, Female  A urinary tract infection (UTI) is an infection in your urinary tract. The urinary tract is made up of organs that make, store, and get rid of pee (urine) in your body. These organs include:  The kidneys.  The ureters.  The bladder.  The urethra.  What are the causes?  Most UTIs are caused by germs called bacteria. They may be in or near your genitals. These germs grow and cause swelling in your urinary tract.  What increases the risk?  You're more likely to get a UTI if:  You're a female. The urethra is shorter in females than in males.  You have a soft tube called a catheter that drains your pee.  You can't control when you pee or poop.  You have trouble peeing because of:  A kidney stone.  A urinary blockage.  A nerve condition that affects your bladder.  Not getting enough to drink.  You're sexually active.  You use a birth control inside your vagina, like spermicide.  You're pregnant.  You have low levels of the hormone estrogen in your body.  You're an older adult.  You're also more likely to get a UTI if you have other health problems. These may include:  Diabetes.  A weak immune system. Your immune system is your body's defense system.  Sickle cell disease.  Injury of the spine.  What are the signs or symptoms?  Symptoms may include:  Needing to pee right away.  Peeing small amounts often.  Pain or burning when you pee.  Blood in your pee.  Pee that smells bad or odd.  Pain in your belly or lower back.  You may also:  Feel confused. This may be the first symptom in older adults.  Vomit.  Not feel hungry.  Feel tired or easily annoyed.  Have a fever or chills.  How is this diagnosed?  A UTI is diagnosed based on your medical history and an exam. You may also have other tests. These may include:  Pee tests.  Blood tests.  Tests for sexually transmitted infections (STIs).  If you've had more than one UTI, you may need to have imaging studies done to find out why you keep getting  them.  How is this treated?  A UTI can be treated by:  Taking antibiotics or other medicines.  Drinking enough fluid to keep your pee pale yellow.  In rare cases, a UTI can cause a very bad condition called sepsis. Sepsis may be treated in the hospital.  Follow these instructions at home:  Medicines  Take your medicines only as told by your health care provider.  If you were given antibiotics, take them as told by your provider. Do not stop taking them even if you start to feel better.  General instructions  Make sure you:  Pee often and fully. Do not hold your pee for a long time.  Wipe from front to back after you pee or poop. Use each tissue only once when you wipe.  Pee after you have sex.  Do not douche or use sprays or powders in your genital area.  Contact a health care provider if:  Your symptoms don't get better after 1-2 days of taking antibiotics.  Your symptoms go away and then come back.  You have a fever or chills.  You vomit or feel like you may vomit.  Get help right away if:  You have very bad pain in your back or lower belly.  You faint.  This information is not intended to replace advice given to you by your health care provider. Make sure you discuss any questions you have with your health care provider.  Document Revised: 07/25/2024 Document Reviewed: 03/22/2024  Elsevier Patient Education © 2024 [employer]

## 2025-04-17 NOTE — PROGRESS NOTES
Chief Complaint  Urinary Frequency (X1 week increased freq and lower back pain, burning, and pelvic pain. OTC meds, meloxicam and muscle relaxer, and patches discomfort still )     Subjective:      History of Present Illness {CC  Problem List  Visit  Diagnosis   Encounters  Notes  Medications  Labs  Result Review Imaging  Media :23}     Paula Trevino presents to [facility] for:    Patient or patient representative verbalized consent for the use of Ambient Listening during the visit with  JOSE Bonilla for chart documentation. 4/17/2025  13:35 EDT     History of Present Illness      The patient is here for difficulty urinating. She has frequent UTIs and normally takes Macrobid along with Pyridium when experiencing these issues. Her PCP is Nitza Mena.    Severe back pain began approximately one week ago, which was not initially associated with a urinary tract infection until the onset of dysuria last night. Increased frequency and volume of urination, accompanied by urgency, are reported. Bladder spasms and cramping are also noted. She is uncertain about any malodor in her urine due to a diminished sense of smell. No recent use of amoxicillin is reported. Systemic symptoms such as fever or chills are denied. Management of symptoms with Macrobid and Pyridium is well tolerated.    Additionally, chronic fatigue and occasional dizziness are reported, with the sensation likened to a car misalignment. She is uncertain if these symptoms are age-related.         I have reviewed patient's medical history, any new submitted information provided by patient or medical assistant and updated medical record.      Objective:      Physical Exam  Vitals reviewed.   Constitutional:       General: She is awake. She is not in acute distress.     Appearance: Normal appearance. She is well-groomed. She is not ill-appearing, toxic-appearing or diaphoretic.   HENT:      Head:  "Normocephalic.      Right Ear: Hearing normal.      Left Ear: Hearing normal.      Nose: Nose normal.      Mouth/Throat:      Lips: Pink.      Mouth: Mucous membranes are moist.   Eyes:      General: Lids are normal. Vision grossly intact.      Conjunctiva/sclera: Conjunctivae normal.   Cardiovascular:      Rate and Rhythm: Normal rate and regular rhythm.      Heart sounds: Normal heart sounds, S1 normal and S2 normal.   Pulmonary:      Effort: Pulmonary effort is normal.      Breath sounds: Normal breath sounds.   Abdominal:      General: Bowel sounds are normal.      Tenderness: There is abdominal tenderness in the suprapubic area. There is right CVA tenderness and left CVA tenderness.   Skin:     General: Skin is warm and dry.      Capillary Refill: Capillary refill takes less than 2 seconds.   Neurological:      General: No focal deficit present.      Mental Status: She is oriented to person, place, and time. Mental status is at baseline. She is lethargic.   Psychiatric:         Attention and Perception: Attention and perception normal.         Mood and Affect: Mood and affect normal.         Speech: Speech normal.         Behavior: Behavior normal. Behavior is cooperative.         Cognition and Memory: Cognition and memory normal.         Judgment: Judgment normal.        Result Review  Data Reviewed:{ Labs  Result Review  Imaging  Med Tab  Media :23}     Results  Labs   - Urine sample: A little bit of blood and leukocytes       The following data was reviewed by: JOSE Bonilla on 04/17/2025  UA          7/22/2024    15:41 4/17/2025    13:40   Urinalysis   Ketones, UA Negative  Negative    Leukocytes, UA Small (1+)  Small (1+)      Urine Culture          7/22/2024    00:00   Urine Culture   Urine Culture Final report             Vital Signs:   /80 (BP Location: Left arm, Patient Position: Sitting, Cuff Size: Adult)   Pulse 68   Temp 97.1 °F (36.2 °C) (Temporal)   Ht 149.9 cm (59.02\")   Wt " 71.2 kg (157 lb)   SpO2 98%   BMI 31.69 kg/m²                   Requested Prescriptions     Signed Prescriptions Disp Refills    nitrofurantoin, macrocrystal-monohydrate, (Macrobid) 100 MG capsule 14 capsule 0     Sig: Take 1 capsule by mouth 2 (Two) Times a Day.    phenazopyridine (Pyridium) 100 MG tablet 30 tablet 0     Sig: Take 1 tablet by mouth 3 (Three) Times a Day As Needed for Bladder Spasms.       Routine medications provided by this office will also be refilled via pharmacy request.       Current Outpatient Medications:     albuterol sulfate  (90 Base) MCG/ACT inhaler, Inhale 2 puffs Every 4 (Four) Hours As Needed for Wheezing or Shortness of Air., Disp: 6.7 g, Rfl: 1    calcium carb-cholecalciferol (Calcium 600+D) 600-10 MG-MCG tablet per tablet, Take 1 tablet by mouth 2 (Two) Times a Day With Meals., Disp: , Rfl:     lisinopril (PRINIVIL,ZESTRIL) 10 MG tablet, Take 1 tablet by mouth once daily, Disp: 90 tablet, Rfl: 0    meloxicam (MOBIC) 15 MG tablet, Take 1 tablet by mouth once daily with food, Disp: 30 tablet, Rfl: 0    tiZANidine (ZANAFLEX) 4 MG tablet, Take 1 tablet by mouth Every 8 (Eight) Hours As Needed for Muscle Spasms., Disp: 30 tablet, Rfl: 0    venlafaxine XR (EFFEXOR-XR) 75 MG 24 hr capsule, Take 1 capsule by mouth Daily., Disp: 90 capsule, Rfl: 1    nitrofurantoin, macrocrystal-monohydrate, (Macrobid) 100 MG capsule, Take 1 capsule by mouth 2 (Two) Times a Day., Disp: 14 capsule, Rfl: 0    phenazopyridine (Pyridium) 100 MG tablet, Take 1 tablet by mouth 3 (Three) Times a Day As Needed for Bladder Spasms., Disp: 30 tablet, Rfl: 0     Assessment and Plan:      Assessment and Plan {CC Problem List  Visit Diagnosis  ROS  Review (Popup)  Health Maintenance  Quality  BestPractice  Medications  SmartSets  SnapShot Encounters  Media :23}     Problem List Items Addressed This Visit    None  Visit Diagnoses         Cystitis    -  Primary    Relevant Medications     nitrofurantoin, macrocrystal-monohydrate, (Macrobid) 100 MG capsule    phenazopyridine (Pyridium) 100 MG tablet      Difficulty urinating        Relevant Medications    nitrofurantoin, macrocrystal-monohydrate, (Macrobid) 100 MG capsule    phenazopyridine (Pyridium) 100 MG tablet    Other Relevant Orders    POCT urinalysis dipstick, automated (Completed)    Urinalysis, Microscopic Only - Urine, Clean Catch    Urine Culture - Urine, Urine, Clean Catch      Dysuria        Relevant Medications    nitrofurantoin, macrocrystal-monohydrate, (Macrobid) 100 MG capsule    phenazopyridine (Pyridium) 100 MG tablet      Acute bilateral low back pain without sciatica, sciatica laterality unspecified        Relevant Medications    nitrofurantoin, macrocrystal-monohydrate, (Macrobid) 100 MG capsule      Urinary urgency        Relevant Medications    nitrofurantoin, macrocrystal-monohydrate, (Macrobid) 100 MG capsule                 1. Urinary Tract Infection.  - Reports burning during urination, frequent urination, urgency, and lower back pain.  - Urine sample showed a little bit of blood and leukocytes, similar to results from 8 months ago.  - Discussed starting Macrobid and Pyridium, which may cause urine to turn bright orange.  - [facility] [address]. Urine culture will be sent for further analysis to confirm the appropriate antibiotic. If needed, medication will be adjusted based on culture results.         Please review added information under the Patient Instructions portion of your print out.    Please complete your lab work today. Following review of results our office will be in contact with you for follow up care, medication changes or further instructions if needed. At that time if we need to schedule a follow up appointment one will be made at the time of the call.    Thank you for allowing us to care for you,  JOSE Bonilla    Follow Up {Instructions Charge Capture  Follow-up  Communications :23}     No follow-ups on file.      Patient was given instructions and counseling regarding her condition or for health maintenance advice. Please see specific information pulled into the AVS if appropriate.        Dragon disclaimer:   Much of this encounter note is an electronic transcription/translation of spoken language to printed text. The electronic translation of spoken language may permit erroneous, or at times, nonsensical words or phrases to be inadvertently transcribed; Although I have reviewed the note for such errors, some may still exist.     Additional Patient Counseling:       Patient Instructions     Urinary Tract Infection, Female  A urinary tract infection (UTI) is an infection in your urinary tract. The urinary tract is made up of organs that make, store, and get rid of pee (urine) in your body. These organs include:  The kidneys.  The ureters.  The bladder.  The urethra.  What are the causes?  Most UTIs are caused by germs called bacteria. They may be in or near your genitals. These germs grow and cause swelling in your urinary tract.  What increases the risk?  You're more likely to get a UTI if:  You're a female. The urethra is shorter in females than in males.  You have a soft tube called a catheter that drains your pee.  You can't control when you pee or poop.  You have trouble peeing because of:  A kidney stone.  A urinary blockage.  A nerve condition that affects your bladder.  Not getting enough to drink.  You're sexually active.  You use a birth control inside your vagina, like spermicide.  You're pregnant.  You have low levels of the hormone estrogen in your body.  You're an older adult.  You're also more likely to get a UTI if you have other health problems. These may include:  Diabetes.  A weak immune system. Your immune system is your body's defense system.  Sickle cell disease.  Injury of the spine.  What are the signs or symptoms?  Symptoms may include:  Needing to  pee right away.  Peeing small amounts often.  Pain or burning when you pee.  Blood in your pee.  Pee that smells bad or odd.  Pain in your belly or lower back.  You may also:  Feel confused. This may be the first symptom in older adults.  Vomit.  Not feel hungry.  Feel tired or easily annoyed.  Have a fever or chills.  How is this diagnosed?  A UTI is diagnosed based on your medical history and an exam. You may also have other tests. These may include:  Pee tests.  Blood tests.  Tests for sexually transmitted infections (STIs).  If you've had more than one UTI, you may need to have imaging studies done to find out why you keep getting them.  How is this treated?  A UTI can be treated by:  Taking antibiotics or other medicines.  Drinking enough fluid to keep your pee pale yellow.  In rare cases, a UTI can cause a very bad condition called sepsis. Sepsis may be treated in the hospital.  Follow these instructions at home:  Medicines  Take your medicines only as told by your health care provider.  If you were given antibiotics, take them as told by your provider. Do not stop taking them even if you start to feel better.  General instructions  Make sure you:  Pee often and fully. Do not hold your pee for a long time.  Wipe from front to back after you pee or poop. Use each tissue only once when you wipe.  Pee after you have sex.  Do not douche or use sprays or powders in your genital area.  Contact a health care provider if:  Your symptoms don't get better after 1-2 days of taking antibiotics.  Your symptoms go away and then come back.  You have a fever or chills.  You vomit or feel like you may vomit.  Get help right away if:  You have very bad pain in your back or lower belly.  You faint.  This information is not intended to replace advice given to you by your health care provider. Make sure you discuss any questions you have with your health care provider.  Document Revised: 07/25/2024 Document Reviewed:  03/22/2024  Elsevier Patient Education © 2024 [employer]

## 2025-04-18 RX ORDER — LISINOPRIL 10 MG/1
10 TABLET ORAL DAILY
Qty: 90 TABLET | Refills: 0 | Status: SHIPPED | OUTPATIENT
Start: 2025-04-18

## 2025-04-22 ENCOUNTER — RESULTS FOLLOW-UP (OUTPATIENT)
Dept: INTERNAL MEDICINE | Facility: CLINIC | Age: 65
End: 2025-04-22
Payer: MEDICARE

## 2025-04-22 LAB
BACTERIA #/AREA URNS HPF: ABNORMAL /HPF
BACTERIA UR CULT: ABNORMAL
CASTS URNS MICRO: ABNORMAL
EPI CELLS #/AREA URNS HPF: ABNORMAL /HPF
OTHER ANTIBIOTIC SUSC ISLT: ABNORMAL
RBC #/AREA URNS HPF: ABNORMAL /HPF
WBC #/AREA URNS HPF: ABNORMAL /HPF
YEAST #/AREA URNS HPF: ABNORMAL /HPF

## 2025-04-22 NOTE — PROGRESS NOTES
Ms. Trevino,    Your urine came back with bacterial growth. The antibiotic that was prescribed will treat this bacteria. Please continue to take the antibiotic as prescribed until completed.     If symptoms are persistent after you have completed the course of your antibiotic please schedule a follow up visit     Thank you for allowing us to care for you,  JOSE Bonilla

## 2025-04-22 NOTE — TELEPHONE ENCOUNTER
Called pt [phone] and left detail message urine culture and to continue to take abx as prescribed on 4/17.

## 2025-04-28 ENCOUNTER — HOSPITAL ENCOUNTER (OUTPATIENT)
Dept: MAMMOGRAPHY | Facility: HOSPITAL | Age: 65
Discharge: HOME | End: 2025-04-28
Admitting: NURSE PRACTITIONER
Payer: MEDICARE

## 2025-04-28 DIAGNOSIS — Z12.31 ENCOUNTER FOR SCREENING MAMMOGRAM FOR MALIGNANT NEOPLASM OF BREAST: ICD-10-CM

## 2025-04-28 PROCEDURE — 77067 SCR MAMMO BI INCL CAD: CPT

## 2025-04-28 PROCEDURE — 77063 BREAST TOMOSYNTHESIS BI: CPT

## 2025-05-02 ENCOUNTER — TELEPHONE (OUTPATIENT)
Dept: PHYSICAL THERAPY | Facility: CLINIC | Age: 65
End: 2025-05-02
Payer: MEDICARE

## 2025-05-02 NOTE — TELEPHONE ENCOUNTER
LM CANCELLING HER APPT TODAY DUE TO HER NOT BEING SEEN SINCE MARCH 5TH. SHE HAS TO SEE [facility] BEFORE WE CAN TREAT HER AGAIN.

## 2025-05-07 ENCOUNTER — TREATMENT (OUTPATIENT)
Dept: PHYSICAL THERAPY | Facility: CLINIC | Age: 65
End: 2025-05-07
Payer: OTHER MISCELLANEOUS

## 2025-05-07 NOTE — PROGRESS NOTES
"Physical Therapy Initial Evaluation and Plan of Care  [facility] Physical Therapy [facility]  [address], [address]  [place] [id number]    Patient: Paula Trevino   : 1960  Diagnosis/ICD-10 Code:  No primary diagnosis found.  Referring practitioner: Ronna Romo MD  Today's Date: 2025    Subjective Evaluation    History of Present Illness  Mechanism of injury: \"Dog yanked on my arm a few months ago.\"    Subjective comment: My L shoulder has been hurting for a while and sometimes my whole arm throbs  Patient Occupation: Works at [employer]  Current pain ratin  At best pain ratin  At worst pain ratin  Quality: burning  Relieving factors: relaxation, rest and support  Aggravating factors: overhead activity, keyboarding, lifting, movement, outstretched reach and repetitive movement    Hand dominance: right    Treatments  Current treatment: physical therapy  Patient Goals  Patient goals for therapy: decreased pain and increased strength         Objective          Active Range of Motion   Left Shoulder   Flexion: 160 degrees   Abduction: 150 degrees WFL  External rotation 45°: 25 degrees     Right Shoulder   Flexion: 170 degrees   Abduction: 160 degrees   External rotation 45°: 45 degrees     Strength/Myotome Testing     Left Shoulder     Planes of Motion   Flexion: 4+   Extension: 4+   Abduction: 4   Adduction: 4   External rotation at 0°: 4-     Right Shoulder   Normal muscle strength          Assessment & Plan       Assessment  Impairments: abnormal or restricted ROM, activity intolerance, impaired physical strength, lacks appropriate home exercise program, pain with function and safety issue   Functional limitations: carrying objects, lifting, pushing, uncomfortable because of pain, reaching behind back, reaching overhead and unable to perform repetitive tasks   Assessment details: Pt is 63 y/o female presenting to PT with symptoms consistent with diagnoses " above. Pt exhibits the following limitations: decreased ... shoulder ROM, decreased ... UE strength, difficulty with overhead activity and pain with ADLs. Pt's signs and symptoms are consistent with referring diagnosis. Pt would benefit from additional skilled therapy in order to address the aforementioned problems and return to prior level of functioning with minimal functional limitations. Reviewed with patient about pathology of RTC and discussed PT vs surgical intervention and POC.          Prognosis: good    Goals  Plan Goals: SHORT TERM GOALS: Time for Goal Achievement: 4 weeks  1.  Patient to be compliant with and understand progression of HEP.   2.  Increase cervical, thoracic and (L) GH mobility to allow for improved mobility of shoulder with less pain  3.  Increased (L) UE strength to 4+/5 to allow for household activities, including lifting.  4.  Pt to exhibit (L) shoulder active flexion/° AROM to assist with reaching overhead without pain.    LONG TERM GOALS: Time for Goal Achievement: 2 months  1.  Pt  to improve score 25% perceived disability on Quick DASH.  2.  Pt. to exhibit (L) shoulder AROM to WFL to allow for reaching overhead and out (ABD) without pain limiting function.  3.  Pt to exhibit 5/5 UE strength to allow for pushing /pulling and lifting to occur with pain < 3/10  4.  Pt able to reach overhead and lift 10# to allow for return to doing home/yard/recreational activities with min to no pain.        Plan  Therapy options: will be seen for skilled therapy services  Planned modality interventions: cryotherapy, electrical stimulation/[place]er stimulation, ultrasound, iontophoresis, TENS and thermotherapy (hydrocollator packs)  Planned therapy interventions: ADL retraining, body mechanics training, functional ROM exercises, home exercise program, manual therapy, neuromuscular re-education, soft tissue mobilization, strengthening, therapeutic activities and stretching  Frequency: 2x  week  Duration in weeks: 8  Treatment plan discussed with: patient        Timed Codes:  Manual Therapy -      0     mins  36284  Therapeutic Exercise: -     15     mins  49087     Neuromuscular Spencer -     0     mins  71986   Therapeutic Activity -      15     mins  61255     Ultrasound -                     _0_   mins  74105  Iontophoresis -                   0     mins  50959    Timed Treatment:      30   mins    Untimed Codes:  Evaluation -          15   mins  Electrical Stimulation -        15   mins  [id number] ([id number])  Traction -                             0   mins  39809    Total Treatment:         60   mins    PT: BETTY Stanford License Number:  [id number]  Electronically signed by Chip Ivory PT, 05/07/25, 5:01 PM EDT    Certification Period: 5/14/2025 thru 8/11/2025  I certify that the therapy services are furnished while this patient is under my care.  The services outlined above are required by this patient, and will be reviewed every 90 days.         Physician Signature:__________________________________________________    PHYSICIAN: Ronna Romo MD      DATE:     Please sign and return via fax to [email or web address] . Thank you, [facility].    PT SIGNATURE: BETTY Stanford LICENSE: [id number]

## 2025-05-14 ENCOUNTER — TELEPHONE (OUTPATIENT)
Dept: PHYSICAL THERAPY | Facility: OTHER | Age: 65
End: 2025-05-14
Payer: MEDICARE

## 2025-05-14 NOTE — TELEPHONE ENCOUNTER
Caller: Paula Trevino    Relationship: Self    What was the call regarding:  SAID TO STOP PT SO PATIENT CANCELLED ALL APPTS

## 2025-05-21 ENCOUNTER — TELEPHONE (OUTPATIENT)
Dept: PHYSICAL THERAPY | Facility: CLINIC | Age: 65
End: 2025-05-21
Payer: MEDICARE

## 2025-05-21 NOTE — TELEPHONE ENCOUNTER
Caller: Paula Trevino    Relationship: Self      What was the call regarding: WORK COMP NEEDS EVAL FAXED TO THEM TO [facility]

## 2025-06-09 ENCOUNTER — PATIENT MESSAGE (OUTPATIENT)
Dept: INTERNAL MEDICINE | Facility: CLINIC | Age: 65
End: 2025-06-09
Payer: MEDICARE

## 2025-06-10 ENCOUNTER — OFFICE VISIT (OUTPATIENT)
Dept: INTERNAL MEDICINE | Facility: CLINIC | Age: 65
End: 2025-06-10
Payer: MEDICARE

## 2025-06-10 VITALS
DIASTOLIC BLOOD PRESSURE: 64 MMHG | WEIGHT: 161.6 LBS | BODY MASS INDEX: 32.58 KG/M2 | HEIGHT: 59 IN | SYSTOLIC BLOOD PRESSURE: 126 MMHG

## 2025-06-10 DIAGNOSIS — J45.21 MILD INTERMITTENT ASTHMA WITH ACUTE EXACERBATION: ICD-10-CM

## 2025-06-10 DIAGNOSIS — L23.7 ALLERGIC CONTACT DERMATITIS DUE TO PLANTS, EXCEPT FOOD: Primary | ICD-10-CM

## 2025-06-10 RX ORDER — METHYLPREDNISOLONE SODIUM SUCCINATE 125 MG/2ML
125 INJECTION INTRAMUSCULAR; INTRAVENOUS ONCE
Status: COMPLETED | OUTPATIENT
Start: 2025-06-10 | End: 2025-06-10

## 2025-06-10 RX ORDER — HYDROCORTISONE 25 MG/G
1 CREAM TOPICAL 2 TIMES DAILY
Qty: 28 G | Refills: 0 | Status: SHIPPED | OUTPATIENT
Start: 2025-06-10

## 2025-06-10 RX ORDER — METHYLPREDNISOLONE 4 MG/1
TABLET ORAL
Qty: 21 TABLET | Refills: 0 | Status: SHIPPED | OUTPATIENT
Start: 2025-06-10 | End: 2025-06-16

## 2025-06-10 RX ORDER — METHYLPREDNISOLONE SODIUM SUCCINATE 125 MG/2ML
125 INJECTION INTRAMUSCULAR; INTRAVENOUS EVERY 6 HOURS
Status: DISCONTINUED | OUTPATIENT
Start: 2025-06-10 | End: 2025-06-10

## 2025-06-10 RX ORDER — METHYLPREDNISOLONE SODIUM SUCCINATE 125 MG/2ML
125 INJECTION INTRAMUSCULAR; INTRAVENOUS ONCE
Status: CANCELLED | OUTPATIENT
Start: 2025-06-10

## 2025-06-10 RX ORDER — ALBUTEROL SULFATE 90 UG/1
2 INHALANT RESPIRATORY (INHALATION) EVERY 4 HOURS PRN
Qty: 6.7 G | Refills: 1 | Status: SHIPPED | OUTPATIENT
Start: 2025-06-10

## 2025-06-10 RX ADMIN — METHYLPREDNISOLONE SODIUM SUCCINATE 125 MG: 125 INJECTION INTRAMUSCULAR; INTRAVENOUS at 16:02

## 2025-06-10 NOTE — TELEPHONE ENCOUNTER
Attempted to call patient, LVM advising call back/[facility] msg    HUB TO READ  We have you scheduled with Nitza today at 3:15

## 2025-06-11 NOTE — TELEPHONE ENCOUNTER
Discussed with patient that flushing and facial redness may be related to steroid but typically do not cause GI upset and/or headache (states these symptoms have improved). She reports significant improvement in rash and itching today, advised that if sx continue to improve she can avoid taking oral steroids on Friday. RTC if sx worsen.

## 2025-06-21 PROBLEM — L23.7 ALLERGIC CONTACT DERMATITIS DUE TO PLANTS, EXCEPT FOOD: Status: ACTIVE | Noted: 2025-06-21

## 2025-06-21 NOTE — PROGRESS NOTES
"Chief Complaint  Poison Ivy (Cut shrubs off fence line late last week//Face, both arms, chest, a couple spots on legs//Facial swelling has gone down since this morning)  Subjective        Paula Trevino presents to [facility]  Poison Ivy    History of Present Illness  The patient presents for evaluation of a rash.    She reports a rash that initially what she thought were bug bites on her arms last Wednesday which has since spread to her face, chin, and other parts of her body. The rash is more erythematous and typically worse in the morning. It is itchy but not painful. She also reports itching on her palms, fingers, and a small spot on her left leg. She has been using hydrocortisone cream 1% for relief. She is concerned about the impact of the rash on her ability to work.    She has been using melatonin for sleep.    She has been experiencing severe back spasms recently.     An MRI of her left shoulder scheduled for yesterday was postponed due to her poison ivy condition. She has a history of a left shoulder injury from work but continues to work with restrictions.    Objective   Vital Signs:  /64 (BP Location: Right arm, Patient Position: Sitting, Cuff Size: Adult)   Ht 149.9 cm (59\")   Wt 73.3 kg (161 lb 9.6 oz)   BMI 32.64 kg/m²   Estimated body mass index is 32.64 kg/m² as calculated from the following:    Height as of this encounter: 149.9 cm (59\").    Weight as of this encounter: 73.3 kg (161 lb 9.6 oz).            Physical Exam  Constitutional:       Appearance: She is well-developed. She is not ill-appearing.   HENT:      Head: Normocephalic.      Right Ear: Hearing, tympanic membrane and external ear normal.      Left Ear: Hearing, tympanic membrane and external ear normal.      Nose: Nose normal. No nasal deformity, mucosal edema or rhinorrhea.      Right Sinus: No maxillary sinus tenderness or frontal sinus tenderness.      Left Sinus: No maxillary sinus tenderness " or frontal sinus tenderness.      Mouth/Throat:      Dentition: Normal dentition.   Eyes:      General: Lids are normal.         Right eye: No discharge.         Left eye: No discharge.      Conjunctiva/sclera: Conjunctivae normal.      Right eye: No exudate.     Left eye: No exudate.  Neck:      Thyroid: No thyroid mass or thyromegaly.      Vascular: No carotid bruit.      Trachea: Trachea normal.   Cardiovascular:      Rate and Rhythm: Regular rhythm.      Pulses: Normal pulses.      Heart sounds: Normal heart sounds. No murmur heard.  Pulmonary:      Effort: No respiratory distress.      Breath sounds: Normal breath sounds. No decreased breath sounds, wheezing, rhonchi or rales.   Abdominal:      General: Bowel sounds are normal.      Palpations: Abdomen is soft.      Tenderness: There is no abdominal tenderness.   Musculoskeletal:      Cervical back: Normal range of motion. No edema.   Lymphadenopathy:      Head:      Right side of head: No submental, submandibular, tonsillar, preauricular, posterior auricular or occipital adenopathy.      Left side of head: No submental, submandibular, tonsillar, preauricular, posterior auricular or occipital adenopathy.   Skin:     General: Skin is warm and dry.      Findings: Rash present. Rash is macular, papular and vesicular.      Nails: There is no clubbing.      Comments: Erythematous, maculopapular rash and vesicles on bilateral upper extremities and face   Neurological:      Mental Status: She is alert.   Psychiatric:         Behavior: Behavior is cooperative.            Result Review :  The following data was reviewed by: JOSE Mueller on 06/10/2025:  Common labs          8/30/2024    15:26 3/7/2025    14:33   Common Labs   Glucose 84  94    BUN 13  16    Creatinine 0.87  0.78    Sodium 138  137    Potassium 4.4  4.4    Chloride 100  100    Calcium 9.4  9.5    Albumin 4.3  4.5    Total Bilirubin 1.0  0.5    Alkaline Phosphatase 99  132    AST (SGOT) 23  19     ALT (SGPT) 28  20    WBC 6.90  5.76    Hemoglobin 14.1  14.1    Hematocrit 41.5  43.1    Platelets 211  270    Total Cholesterol 203     Triglycerides 55     HDL Cholesterol 66     LDL Cholesterol  127            Results               Assessment and Plan   Diagnoses and all orders for this visit:    1. Allergic contact dermatitis due to plants, except food (Primary)  Assessment & Plan:  Extensive and inflamed, likely due to scratching. Not contagious. Prescribe stronger hydrocortisone cream and prednisone. Administer Solu-Medrol injection today for immediate relief. Advise cool compresses for itching. Recommend Benadryl 25 mg every 6 to 8 hours for itching and sleep.    Orders:  -     methylPREDNISolone (Medrol) 4 MG dose pack; follow package directions  Dispense: 21 tablet; Refill: 0  -     hydrocortisone 2.5 % cream; Apply 1 Application topically to the appropriate area as directed 2 (Two) Times a Day.  Dispense: 28 g; Refill: 0  -     Discontinue: methylPREDNISolone sodium succinate (SOLU-Medrol) injection 125 mg  -     methylPREDNISolone sodium succinate (SOLU-Medrol) injection 125 mg    2. Mild intermittent asthma with acute exacerbation  Assessment & Plan:  Refill for Albuterol inh sent to pharmacy.      Orders:  -     albuterol sulfate  (90 Base) MCG/ACT inhaler; Inhale 2 puffs Every 4 (Four) Hours As Needed for Wheezing or Shortness of Air.  Dispense: 6.7 g; Refill: 1      Assessment & Plan           Follow Up   Return if symptoms worsen or fail to improve, for Next scheduled follow up.  Patient was given instructions and counseling regarding her condition or for health maintenance advice. Please see specific information pulled into the AVS if appropriate.     Patient or patient representative verbalized consent for the use of Ambient Listening during the visit with  JOSE Mueller for chart documentation. 6/21/2025  14:26 EDT

## 2025-06-21 NOTE — ASSESSMENT & PLAN NOTE
Extensive and inflamed, likely due to scratching. Not contagious. Prescribe stronger hydrocortisone cream and prednisone. Administer Solu-Medrol injection today for immediate relief. Advise cool compresses for itching. Recommend Benadryl 25 mg every 6 to 8 hours for itching and sleep.

## 2025-06-30 ENCOUNTER — OFFICE VISIT (OUTPATIENT)
Dept: INTERNAL MEDICINE | Facility: CLINIC | Age: 65
End: 2025-06-30
Payer: MEDICARE

## 2025-06-30 ENCOUNTER — HOSPITAL ENCOUNTER (OUTPATIENT)
Facility: HOSPITAL | Age: 65
Discharge: HOME | End: 2025-06-30
Payer: MEDICARE

## 2025-06-30 VITALS
BODY MASS INDEX: 33.02 KG/M2 | WEIGHT: 163.8 LBS | HEIGHT: 59 IN | RESPIRATION RATE: 20 BRPM | TEMPERATURE: 98.4 F | HEART RATE: 66 BPM | OXYGEN SATURATION: 98 % | DIASTOLIC BLOOD PRESSURE: 70 MMHG | SYSTOLIC BLOOD PRESSURE: 112 MMHG

## 2025-06-30 DIAGNOSIS — M54.50 ACUTE BILATERAL LOW BACK PAIN WITHOUT SCIATICA: ICD-10-CM

## 2025-06-30 DIAGNOSIS — S93.401A SPRAIN OF RIGHT ANKLE, UNSPECIFIED LIGAMENT, INITIAL ENCOUNTER: ICD-10-CM

## 2025-06-30 DIAGNOSIS — J45.20 MILD INTERMITTENT ASTHMA WITHOUT COMPLICATION: Primary | Chronic | ICD-10-CM

## 2025-06-30 DIAGNOSIS — M79.671 PAIN OF MIDFOOT, RIGHT: ICD-10-CM

## 2025-06-30 PROCEDURE — 73630 X-RAY EXAM OF FOOT: CPT

## 2025-06-30 PROCEDURE — 73610 X-RAY EXAM OF ANKLE: CPT

## 2025-06-30 RX ORDER — FLUTICASONE PROPIONATE AND SALMETEROL 250; 50 UG/1; UG/1
1 POWDER RESPIRATORY (INHALATION)
Qty: 1 EACH | Refills: 0 | Status: SHIPPED | OUTPATIENT
Start: 2025-06-30

## 2025-06-30 RX ORDER — MONTELUKAST SODIUM 10 MG/1
10 TABLET ORAL NIGHTLY
Qty: 90 TABLET | Refills: 1 | Status: SHIPPED | OUTPATIENT
Start: 2025-06-30

## 2025-07-06 PROBLEM — M54.50 ACUTE BILATERAL LOW BACK PAIN WITHOUT SCIATICA: Status: ACTIVE | Noted: 2025-07-06

## 2025-07-06 PROBLEM — S93.401A SPRAIN OF RIGHT ANKLE: Status: ACTIVE | Noted: 2025-07-06

## 2025-07-06 NOTE — PROGRESS NOTES
"Chief Complaint  Foot Injury and Ankle Pain  Subjective        Paula Trevino presents to [facility]  History of Present Illness  History of Present Illness  The patient presents for evaluation of asthma, right foot pain, and leg swelling.    She experienced sudden chest tightness on 06/24/2025, severe enough to warrant an ER visit. Symptoms worsened with hot weather, and she lacks air conditioning. Albuterol inhaler provided no relief. No wheezing, but persistent chest tightness and dyspnea. Discontinued Singulair. Underwent EKG at urgent care and ER, received breathing treatments. Diagnosed with asthma in adulthood.    Reports right foot pain, suspecting broken toe and sprained ankle from an incident a few weeks ago. Injured foot navigating uneven yard in the dark, possibly twisted again at work. Poor balance, veering sensation. No swelling, significant soreness. Continues to walk on foot out of necessity. Previous toe fracture with prolonged bruising.    Reports leg swelling, managed with compression socks. Socks tight, leave marks. Recalls painful leg due to hardness.    Objective   Vital Signs:  /70 (BP Location: Left arm)   Pulse 66   Temp 98.4 °F (36.9 °C) (Oral)   Resp 20   Ht 149.9 cm (59\")   Wt 74.3 kg (163 lb 12.8 oz)   SpO2 98%   BMI 33.08 kg/m²   Estimated body mass index is 33.08 kg/m² as calculated from the following:    Height as of this encounter: 149.9 cm (59\").    Weight as of this encounter: 74.3 kg (163 lb 12.8 oz).            Physical Exam  Constitutional:       Appearance: She is well-developed. She is not ill-appearing.   HENT:      Head: Normocephalic.      Right Ear: Hearing, tympanic membrane and external ear normal.      Left Ear: Hearing, tympanic membrane and external ear normal.      Nose: Nose normal. No nasal deformity, mucosal edema or rhinorrhea.      Right Sinus: No maxillary sinus tenderness or frontal sinus tenderness.      Left Sinus: " No maxillary sinus tenderness or frontal sinus tenderness.      Mouth/Throat:      Dentition: Normal dentition.   Eyes:      General: Lids are normal.         Right eye: No discharge.         Left eye: No discharge.      Conjunctiva/sclera: Conjunctivae normal.      Right eye: No exudate.     Left eye: No exudate.  Neck:      Thyroid: No thyroid mass or thyromegaly.      Vascular: No carotid bruit.      Trachea: Trachea normal.   Cardiovascular:      Rate and Rhythm: Regular rhythm.      Pulses: Normal pulses.      Heart sounds: Normal heart sounds. No murmur heard.  Pulmonary:      Effort: No respiratory distress.      Breath sounds: Normal breath sounds. No decreased breath sounds, wheezing, rhonchi or rales.   Abdominal:      General: Bowel sounds are normal.      Palpations: Abdomen is soft.      Tenderness: There is no abdominal tenderness.   Musculoskeletal:      Cervical back: Normal range of motion. No edema.      Right ankle: No tenderness. Normal range of motion.      Right foot: Normal range of motion.   Feet:      Comments: +tenderness right midfoot with mid soft tissue swelling  Lymphadenopathy:      Head:      Right side of head: No submental, submandibular, tonsillar, preauricular, posterior auricular or occipital adenopathy.      Left side of head: No submental, submandibular, tonsillar, preauricular, posterior auricular or occipital adenopathy.   Skin:     General: Skin is warm and dry.      Nails: There is no clubbing.   Neurological:      Mental Status: She is alert.   Psychiatric:         Behavior: Behavior is cooperative.            Result Review :  The following data was reviewed by: JOSE Mueller on 06/30/2025:  Common labs          8/30/2024    15:26 3/7/2025    14:33   Common Labs   Glucose 84  94    BUN 13  16    Creatinine 0.87  0.78    Sodium 138  137    Potassium 4.4  4.4    Chloride 100  100    Calcium 9.4  9.5    Albumin 4.3  4.5    Total Bilirubin 1.0  0.5    Alkaline  Phosphatase 99  132    AST (SGOT) 23  19    ALT (SGPT) 28  20    WBC 6.90  5.76    Hemoglobin 14.1  14.1    Hematocrit 41.5  43.1    Platelets 211  270    Total Cholesterol 203     Triglycerides 55     HDL Cholesterol 66     LDL Cholesterol  127       Data reviewed : Recent hospitalization notes ER notes 6/24/25     Results  Laboratory Studies  Normal troponin, blood count, kidney and liver function tests.    Imaging  Normal chest x-ray, no pneumonia.    Testing  Normal EKG.             Assessment and Plan   Diagnoses and all orders for this visit:    1. Mild intermittent asthma without complication (Primary)  Assessment & Plan:  Seen in ER on 06/24/2025 for chest tightness attributed to asthma. Normal chest x-ray and EKG. Albuterol inhaler ineffective. Prescribed montelukast and new inhaler.  to assist with patient assistance for inhalers.    Orders:  -     montelukast (Singulair) 10 MG tablet; Take 1 tablet by mouth Every Night.  Dispense: 90 tablet; Refill: 1  -     Fluticasone-Salmeterol (ADVAIR/WIXELA) 250-50 MCG/ACT DISKUS; Inhale 1 puff 2 (Two) Times a Day. Rinse and spit after use  Dispense: 1 each; Refill: 0  -     Ambulatory Referral to Social Care Services (Amb Case Mgmt)    2. Acute bilateral low back pain without sciatica  Assessment & Plan:  C/o low back pain without lower extremity weakness, continue to monitor.    Orders:  -     tiZANidine (ZANAFLEX) 4 MG tablet; Take 1 tablet by mouth Every 8 (Eight) Hours As Needed for Muscle Spasms.  Dispense: 30 tablet; Refill: 1    3. Sprain of right ankle, unspecified ligament, initial encounter  Assessment & Plan:  Suspected broken toe and sprained ankle from injury a few weeks ago. Ordered x-ray of right foot and ankle. Advised ice application to reduce inflammation.    Orders:  -     XR Ankle 3+ View Right    4. Pain of midfoot, right  -     XR Foot 3+ View Right      Assessment & Plan  Leg swelling.  Swelling in legs, especially after prolonged  standing. Recommended compression socks to promote blood flow. Option to choose lower grade if current ones are too tight.         Follow Up   Return if symptoms worsen or fail to improve, for Next scheduled follow up.  Patient was given instructions and counseling regarding her condition or for health maintenance advice. Please see specific information pulled into the AVS if appropriate.     Patient or patient representative verbalized consent for the use of Ambient Listening during the visit with  JOSE Mueller for chart documentation. 7/6/2025  12:22 EDT

## 2025-07-06 NOTE — ASSESSMENT & PLAN NOTE
Suspected broken toe and sprained ankle from injury a few weeks ago. Ordered x-ray of right foot and ankle. Advised ice application to reduce inflammation.

## 2025-07-06 NOTE — ASSESSMENT & PLAN NOTE
Seen in ER on 06/24/2025 for chest tightness attributed to asthma. Normal chest x-ray and EKG. Albuterol inhaler ineffective. Prescribed montelukast and new inhaler.  to assist with patient assistance for inhalers.

## 2025-07-07 ENCOUNTER — REFERRAL TRIAGE (OUTPATIENT)
Age: 65
End: 2025-07-07
Payer: MEDICARE

## 2025-07-09 ENCOUNTER — PATIENT OUTREACH (OUTPATIENT)
Age: 65
End: 2025-07-09
Payer: MEDICARE

## 2025-07-09 NOTE — OUTREACH NOTE
Social Work Assessment  Questions/Answers      Flowsheet Row Most Recent Value   Referral Source physician   Reason for Consult medication concerns   Preferred Language English   People in Home alone   Current Living Arrangements home   Potentially Unsafe Housing Conditions none   In the past 12 months has the electric, gas, oil, or water company threatened to shut off services in your home? No   Primary Care Provided by self   Quality of Family Relationships unable to assess   Source of Income salary/wages   Financial/Environmental Concerns unable to afford medication(s)   Application for Public Assistance obtained public assistance pending number   Medications independent   Meal Preparation independent   Housekeeping independent   Laundry independent   Shopping independent   If for any reason you need help with day-to-day activities such as bathing, preparing meals, shopping, managing finances, etc., do you get the help you need? I don't need any help   Q1: How often do you have a drink containing alcohol? Pt Declined   Q2: How many drinks containing alcohol do you have on a typical day when you are drinking? Pt Declined   Q3: How often do you have six or more drinks on one occasion? Pt Declined   Audit-C Score -1        SDOH updated and reviewed with the patient during this program:  --     Alcohol Use: Patient Declined (7/9/2025)    AUDIT-C     Frequency of Alcohol Consumption: Patient declined     Average Number of Drinks: Patient declined     Frequency of Binge Drinking: Patient declined      --     Depression: Not at risk (3/7/2025)    PHQ-2     PHQ-2 Score: 1        --     Employment: Not At Risk (5/23/2024)    Employment     Do you want help finding or keeping work or a job?: I do not need or want help      Financial Resource Strain: High Risk (7/9/2025)    Overall Financial Resource Strain (CARDIA)     Difficulty of Paying Living Expenses: Hard      --     Food Insecurity: No Food Insecurity (7/9/2025)     Hunger Vital Sign     Worried About Running Out of Food in the Last Year: Never true     Ran Out of Food in the Last Year: Never true      --     Health Literacy: Unknown (7/9/2025)    Education     Preferred Language: English      --     Housing Stability: Unknown (7/9/2025)    Housing Stability Vital Sign     Unable to Pay for Housing in the Last Year: No     Homeless in the Last Year: No      --     Interpersonal Safety: Patient Declined (7/9/2025)    Humiliation, Afraid, Rape, and Kick questionnaire     Fear of Current or Ex-Partner: Patient declined     Emotionally Abused: Patient declined     Physically Abused: Patient declined     Sexually Abused: Patient declined      --     Physical Activity: Patient Declined (7/9/2025)    Exercise Vital Sign     Days of Exercise per Week: Patient declined     Minutes of Exercise per Session: Patient declined      --     Social Connections: Unknown (7/9/2025)    Social Connection and Isolation Panel [NHANES]     Frequency of Communication with Friends and Family: More than three times a week      --     Stress: Stress Concern Present (7/9/2025)    Citizen of [place] [employer] - Occupational Stress Questionnaire     Feeling of Stress : To some extent      --     Tobacco Use: Low Risk  (7/6/2025)    Patient History     Smoking Tobacco Use: Never     Smokeless Tobacco Use: Never      --     Transportation Needs: No Transportation Needs (7/9/2025)    PRAPARE - Transportation     Lack of Transportation (Medical): No     Lack of Transportation (Non-Medical): No      --     Utilities: Not At Risk (7/9/2025)    C Utilities     Threatened with loss of utilities: No      Continuing Care   Community & DME   [facility] [place] [facility]    [address], [place] [address]    Phone: [phone]    Request Status: Accepted    Services: Financial Assistance    Resource for: Financial Resource Strain, Utilities   Patient Outreach    SW  spoke with pt re her referral for medication affordability concerns. Per pt, she does not recall if she utilized the [facility] hotline provided to her last year by fellow MSW. Pt cannot apply online, and reports technological skills deficits. SW offered to provided the hotline to her again for [facility]. Pt agreeable and expressed thanks. SW to F/u next week re [employer] [facility] as a linkage.     Rocio RAMIREZ -   Ambulatory Case Management    7/9/2025, 12:38 EDT

## 2025-07-18 ENCOUNTER — PATIENT OUTREACH (OUTPATIENT)
Age: 65
End: 2025-07-18
Payer: MEDICARE

## 2025-07-18 PROBLEM — M75.112 INCOMPLETE TEAR OF LEFT ROTATOR CUFF: Status: ACTIVE | Noted: 2025-07-18

## 2025-07-18 PROBLEM — M75.82 ROTATOR CUFF TENDONITIS, LEFT: Status: ACTIVE | Noted: 2025-07-18

## 2025-07-18 NOTE — OUTREACH NOTE
SW attempted contact with [facility]. SW will attempt again in a couple of business days.     Rocio RAMIREZ -   Ambulatory Case Management    7/18/2025, 11:32 EDT

## 2025-07-19 DIAGNOSIS — I10 ESSENTIAL HYPERTENSION: ICD-10-CM

## 2025-07-21 RX ORDER — LISINOPRIL 10 MG/1
10 TABLET ORAL DAILY
Qty: 90 TABLET | Refills: 0 | Status: SHIPPED | OUTPATIENT
Start: 2025-07-21

## 2025-07-22 ENCOUNTER — PATIENT OUTREACH (OUTPATIENT)
Age: 65
End: 2025-07-22
Payer: MEDICARE

## 2025-07-22 NOTE — OUTREACH NOTE
SW attempted to contact pt a second time, and scheduled a third call for one week out. SW will attempt again in a week.     Rocio RAMIREZ -   Ambulatory Case Management    7/22/2025, 10:37 EDT

## 2025-07-29 ENCOUNTER — PATIENT OUTREACH (OUTPATIENT)
Age: 65
End: 2025-07-29
Payer: MEDICARE

## 2025-07-29 NOTE — OUTREACH NOTE
Patient Outreach    SW spoke with pt and she denies further needs at this time. Pt aware to contact SW with any other needs. SW to D/c due to initial outreach goal being met.     Rocio RAMIREZ -   Ambulatory Case Management    7/29/2025, 15:38 EDT